# Patient Record
Sex: FEMALE | Race: WHITE | Employment: OTHER
[De-identification: names, ages, dates, MRNs, and addresses within clinical notes are randomized per-mention and may not be internally consistent; named-entity substitution may affect disease eponyms.]

---

## 2017-01-10 ENCOUNTER — OFFICE VISIT (OUTPATIENT)
Dept: FAMILY MEDICINE CLINIC | Facility: CLINIC | Age: 82
End: 2017-01-10

## 2017-01-10 VITALS
WEIGHT: 177 LBS | HEART RATE: 68 BPM | SYSTOLIC BLOOD PRESSURE: 123 MMHG | BODY MASS INDEX: 31.36 KG/M2 | DIASTOLIC BLOOD PRESSURE: 58 MMHG | HEIGHT: 63 IN

## 2017-01-10 DIAGNOSIS — R91.1 LUNG NODULE SEEN ON IMAGING STUDY: ICD-10-CM

## 2017-01-10 DIAGNOSIS — I10 ESSENTIAL HYPERTENSION: Primary | ICD-10-CM

## 2017-01-10 DIAGNOSIS — K21.9 GASTROESOPHAGEAL REFLUX DISEASE, ESOPHAGITIS PRESENCE NOT SPECIFIED: ICD-10-CM

## 2017-01-10 DIAGNOSIS — R91.1 PULMONARY NODULE: ICD-10-CM

## 2017-01-10 DIAGNOSIS — I35.0 NONRHEUMATIC AORTIC VALVE STENOSIS: ICD-10-CM

## 2017-01-10 PROCEDURE — 99213 OFFICE O/P EST LOW 20 MIN: CPT | Performed by: FAMILY MEDICINE

## 2017-01-10 RX ORDER — LISINOPRIL 10 MG/1
TABLET ORAL
Qty: 90 TABLET | Refills: 2 | Status: SHIPPED | OUTPATIENT
Start: 2017-01-10

## 2017-01-10 RX ORDER — ATORVASTATIN CALCIUM 40 MG/1
40 TABLET, FILM COATED ORAL DAILY
Qty: 90 TABLET | Refills: 3 | Status: SHIPPED | OUTPATIENT
Start: 2017-01-10

## 2017-01-10 ASSESSMENT — ENCOUNTER SYMPTOMS
SHORTNESS OF BREATH: 0
ABDOMINAL PAIN: 0
VOMITING: 0
DIARRHEA: 0
COUGH: 0
TROUBLE SWALLOWING: 0
SORE THROAT: 0
WHEEZING: 0

## 2017-01-26 RX ORDER — CITALOPRAM 20 MG/1
TABLET ORAL
Qty: 90 TABLET | Refills: 0 | Status: SHIPPED | OUTPATIENT
Start: 2017-01-26 | End: 2017-04-20 | Stop reason: SDUPTHER

## 2017-04-10 ENCOUNTER — OFFICE VISIT (OUTPATIENT)
Dept: FAMILY MEDICINE CLINIC | Age: 82
End: 2017-04-10
Payer: MEDICARE

## 2017-04-10 VITALS
DIASTOLIC BLOOD PRESSURE: 55 MMHG | SYSTOLIC BLOOD PRESSURE: 110 MMHG | HEIGHT: 63 IN | WEIGHT: 174 LBS | HEART RATE: 67 BPM | BODY MASS INDEX: 30.83 KG/M2

## 2017-04-10 DIAGNOSIS — F32.A DEPRESSION, UNSPECIFIED DEPRESSION TYPE: ICD-10-CM

## 2017-04-10 DIAGNOSIS — I35.0 NONRHEUMATIC AORTIC VALVE STENOSIS: ICD-10-CM

## 2017-04-10 DIAGNOSIS — I10 ESSENTIAL HYPERTENSION: Primary | ICD-10-CM

## 2017-04-10 DIAGNOSIS — K21.9 GASTROESOPHAGEAL REFLUX DISEASE, ESOPHAGITIS PRESENCE NOT SPECIFIED: ICD-10-CM

## 2017-04-10 DIAGNOSIS — E78.00 HYPERCHOLESTEREMIA: ICD-10-CM

## 2017-04-10 DIAGNOSIS — R91.1 PULMONARY NODULE: ICD-10-CM

## 2017-04-10 PROCEDURE — 99214 OFFICE O/P EST MOD 30 MIN: CPT | Performed by: FAMILY MEDICINE

## 2017-04-11 ASSESSMENT — ENCOUNTER SYMPTOMS
RHINORRHEA: 0
VOMITING: 0
ABDOMINAL PAIN: 0
SORE THROAT: 0
TROUBLE SWALLOWING: 0
SHORTNESS OF BREATH: 0
BLOOD IN STOOL: 0
DIARRHEA: 0
WHEEZING: 0
COUGH: 0

## 2017-04-20 RX ORDER — CITALOPRAM 20 MG/1
TABLET ORAL
Qty: 90 TABLET | Refills: 0 | Status: SHIPPED | OUTPATIENT
Start: 2017-04-20

## 2017-07-25 RX ORDER — CITALOPRAM 20 MG/1
TABLET ORAL
Qty: 90 TABLET | Refills: 0 | Status: CANCELLED | OUTPATIENT
Start: 2017-07-25

## 2017-12-17 ENCOUNTER — HOSPITAL ENCOUNTER (INPATIENT)
Age: 82
LOS: 3 days | Discharge: HOME | DRG: 378 | End: 2017-12-20
Attending: EMERGENCY MEDICINE | Admitting: INTERNAL MEDICINE
Payer: MEDICARE

## 2017-12-17 ENCOUNTER — APPOINTMENT (OUTPATIENT)
Dept: CT IMAGING | Age: 82
DRG: 378 | End: 2017-12-17
Payer: MEDICARE

## 2017-12-17 DIAGNOSIS — K92.2 GASTROINTESTINAL HEMORRHAGE, UNSPECIFIED GASTROINTESTINAL HEMORRHAGE TYPE: Primary | ICD-10-CM

## 2017-12-17 DIAGNOSIS — D64.9 ANEMIA, UNSPECIFIED TYPE: ICD-10-CM

## 2017-12-17 LAB
ABSOLUTE EOS #: 0.1 K/UL (ref 0–0.4)
ABSOLUTE IMMATURE GRANULOCYTE: ABNORMAL K/UL (ref 0–0.3)
ABSOLUTE LYMPH #: 0.9 K/UL (ref 1–4.8)
ABSOLUTE MONO #: 0.5 K/UL (ref 0.2–0.8)
ALBUMIN SERPL-MCNC: 3.2 G/DL (ref 3.5–5.2)
ALBUMIN/GLOBULIN RATIO: ABNORMAL (ref 1–2.5)
ALP BLD-CCNC: 67 U/L (ref 35–104)
ALT SERPL-CCNC: 8 U/L (ref 5–33)
AMYLASE: 34 U/L (ref 28–100)
ANION GAP SERPL CALCULATED.3IONS-SCNC: 13 MMOL/L (ref 9–17)
AST SERPL-CCNC: 11 U/L
BASOPHILS # BLD: 0 % (ref 0–2)
BASOPHILS ABSOLUTE: 0 K/UL (ref 0–0.2)
BILIRUB SERPL-MCNC: 0.28 MG/DL (ref 0.3–1.2)
BUN BLDV-MCNC: 53 MG/DL (ref 8–23)
BUN/CREAT BLD: 60 (ref 9–20)
CALCIUM SERPL-MCNC: 8.6 MG/DL (ref 8.6–10.4)
CHLORIDE BLD-SCNC: 100 MMOL/L (ref 98–107)
CO2: 23 MMOL/L (ref 20–31)
CREAT SERPL-MCNC: 0.88 MG/DL (ref 0.5–0.9)
DATE, STOOL #1: ABNORMAL
DATE, STOOL #2: ABNORMAL
DATE, STOOL #3: ABNORMAL
DIFFERENTIAL TYPE: ABNORMAL
EOSINOPHILS RELATIVE PERCENT: 1 % (ref 1–4)
GFR AFRICAN AMERICAN: >60 ML/MIN
GFR NON-AFRICAN AMERICAN: >60 ML/MIN
GFR SERPL CREATININE-BSD FRML MDRD: ABNORMAL ML/MIN/{1.73_M2}
GFR SERPL CREATININE-BSD FRML MDRD: ABNORMAL ML/MIN/{1.73_M2}
GLUCOSE BLD-MCNC: 147 MG/DL (ref 70–99)
HCT VFR BLD CALC: 22.2 % (ref 36–46)
HCT VFR BLD CALC: 26 % (ref 36–46)
HEMOCCULT SP1 STL QL: POSITIVE
HEMOCCULT SP2 STL QL: ABNORMAL
HEMOCCULT SP3 STL QL: ABNORMAL
HEMOGLOBIN: 7.3 G/DL (ref 12–16)
HEMOGLOBIN: 8.4 G/DL (ref 12–16)
IMMATURE GRANULOCYTES: ABNORMAL %
INR BLD: 1
LACTATE DEHYDROGENASE: 111 U/L (ref 135–214)
LACTIC ACID: 1.2 MMOL/L (ref 0.5–2.2)
LIPASE: 23 U/L (ref 13–60)
LYMPHOCYTES # BLD: 9 % (ref 24–44)
MCH RBC QN AUTO: 29.3 PG (ref 26–34)
MCHC RBC AUTO-ENTMCNC: 32.2 G/DL (ref 31–37)
MCV RBC AUTO: 90.8 FL (ref 80–100)
MONOCYTES # BLD: 5 % (ref 1–7)
PARTIAL THROMBOPLASTIN TIME: 23.1 SEC (ref 23–31)
PDW BLD-RTO: 13.3 % (ref 11.5–14.5)
PLATELET # BLD: 201 K/UL (ref 130–400)
PLATELET ESTIMATE: ABNORMAL
PMV BLD AUTO: ABNORMAL FL (ref 6–12)
POTASSIUM SERPL-SCNC: 4.9 MMOL/L (ref 3.7–5.3)
PROTHROMBIN TIME: 10.7 SEC (ref 9.7–11.6)
RBC # BLD: 2.86 M/UL (ref 4–5.2)
RBC # BLD: ABNORMAL 10*6/UL
SEG NEUTROPHILS: 85 % (ref 36–66)
SEGMENTED NEUTROPHILS ABSOLUTE COUNT: 8.6 K/UL (ref 1.8–7.7)
SODIUM BLD-SCNC: 136 MMOL/L (ref 135–144)
TIME, STOOL #1: 1724
TIME, STOOL #2: ABNORMAL
TIME, STOOL #3: ABNORMAL
TOTAL PROTEIN: 5.1 G/DL (ref 6.4–8.3)
WBC # BLD: 10.1 K/UL (ref 3.5–11)
WBC # BLD: ABNORMAL 10*3/UL

## 2017-12-17 PROCEDURE — 2000000000 HC ICU R&B

## 2017-12-17 PROCEDURE — G0328 FECAL BLOOD SCRN IMMUNOASSAY: HCPCS

## 2017-12-17 PROCEDURE — 86850 RBC ANTIBODY SCREEN: CPT

## 2017-12-17 PROCEDURE — 36430 TRANSFUSION BLD/BLD COMPNT: CPT

## 2017-12-17 PROCEDURE — 99285 EMERGENCY DEPT VISIT HI MDM: CPT

## 2017-12-17 PROCEDURE — 85610 PROTHROMBIN TIME: CPT

## 2017-12-17 PROCEDURE — 85025 COMPLETE CBC W/AUTO DIFF WBC: CPT

## 2017-12-17 PROCEDURE — 6360000004 HC RX CONTRAST MEDICATION: Performed by: EMERGENCY MEDICINE

## 2017-12-17 PROCEDURE — 96365 THER/PROPH/DIAG IV INF INIT: CPT

## 2017-12-17 PROCEDURE — C9113 INJ PANTOPRAZOLE SODIUM, VIA: HCPCS | Performed by: PHYSICIAN ASSISTANT

## 2017-12-17 PROCEDURE — 83605 ASSAY OF LACTIC ACID: CPT

## 2017-12-17 PROCEDURE — 85018 HEMOGLOBIN: CPT

## 2017-12-17 PROCEDURE — 2580000003 HC RX 258: Performed by: EMERGENCY MEDICINE

## 2017-12-17 PROCEDURE — 82150 ASSAY OF AMYLASE: CPT

## 2017-12-17 PROCEDURE — 83690 ASSAY OF LIPASE: CPT

## 2017-12-17 PROCEDURE — 2580000003 HC RX 258: Performed by: PHYSICIAN ASSISTANT

## 2017-12-17 PROCEDURE — 85014 HEMATOCRIT: CPT

## 2017-12-17 PROCEDURE — 86901 BLOOD TYPING SEROLOGIC RH(D): CPT

## 2017-12-17 PROCEDURE — 86900 BLOOD TYPING SEROLOGIC ABO: CPT

## 2017-12-17 PROCEDURE — 86920 COMPATIBILITY TEST SPIN: CPT

## 2017-12-17 PROCEDURE — 36415 COLL VENOUS BLD VENIPUNCTURE: CPT

## 2017-12-17 PROCEDURE — 83615 LACTATE (LD) (LDH) ENZYME: CPT

## 2017-12-17 PROCEDURE — 74177 CT ABD & PELVIS W/CONTRAST: CPT

## 2017-12-17 PROCEDURE — P9016 RBC LEUKOCYTES REDUCED: HCPCS

## 2017-12-17 PROCEDURE — 85730 THROMBOPLASTIN TIME PARTIAL: CPT

## 2017-12-17 PROCEDURE — 2580000003 HC RX 258: Performed by: INTERNAL MEDICINE

## 2017-12-17 PROCEDURE — 80053 COMPREHEN METABOLIC PANEL: CPT

## 2017-12-17 PROCEDURE — 6360000002 HC RX W HCPCS: Performed by: PHYSICIAN ASSISTANT

## 2017-12-17 RX ORDER — ACETAMINOPHEN 325 MG/1
650 TABLET ORAL EVERY 4 HOURS PRN
Status: DISCONTINUED | OUTPATIENT
Start: 2017-12-17 | End: 2017-12-20 | Stop reason: HOSPADM

## 2017-12-17 RX ORDER — ONDANSETRON 2 MG/ML
4 INJECTION INTRAMUSCULAR; INTRAVENOUS EVERY 6 HOURS PRN
Status: DISCONTINUED | OUTPATIENT
Start: 2017-12-17 | End: 2017-12-20 | Stop reason: HOSPADM

## 2017-12-17 RX ORDER — SODIUM CHLORIDE 9 MG/ML
INJECTION, SOLUTION INTRAVENOUS CONTINUOUS
Status: DISCONTINUED | OUTPATIENT
Start: 2017-12-17 | End: 2017-12-19

## 2017-12-17 RX ORDER — 0.9 % SODIUM CHLORIDE 0.9 %
250 INTRAVENOUS SOLUTION INTRAVENOUS ONCE
Status: DISCONTINUED | OUTPATIENT
Start: 2017-12-17 | End: 2017-12-20

## 2017-12-17 RX ORDER — SODIUM CHLORIDE 0.9 % (FLUSH) 0.9 %
10 SYRINGE (ML) INJECTION PRN
Status: DISCONTINUED | OUTPATIENT
Start: 2017-12-17 | End: 2017-12-18 | Stop reason: SDUPTHER

## 2017-12-17 RX ORDER — ATORVASTATIN CALCIUM 40 MG/1
40 TABLET, FILM COATED ORAL DAILY
Status: DISCONTINUED | OUTPATIENT
Start: 2017-12-18 | End: 2017-12-20 | Stop reason: HOSPADM

## 2017-12-17 RX ORDER — DIGOXIN 125 MCG
125 TABLET ORAL DAILY
Status: DISCONTINUED | OUTPATIENT
Start: 2017-12-18 | End: 2017-12-20 | Stop reason: HOSPADM

## 2017-12-17 RX ORDER — CITALOPRAM 20 MG/1
20 TABLET ORAL DAILY
Status: DISCONTINUED | OUTPATIENT
Start: 2017-12-18 | End: 2017-12-20 | Stop reason: HOSPADM

## 2017-12-17 RX ORDER — FUROSEMIDE 10 MG/ML
10 INJECTION INTRAMUSCULAR; INTRAVENOUS SEE ADMIN INSTRUCTIONS
Status: DISCONTINUED | OUTPATIENT
Start: 2017-12-17 | End: 2017-12-20

## 2017-12-17 RX ORDER — 0.9 % SODIUM CHLORIDE 0.9 %
50 INTRAVENOUS SOLUTION INTRAVENOUS ONCE
Status: COMPLETED | OUTPATIENT
Start: 2017-12-17 | End: 2017-12-17

## 2017-12-17 RX ORDER — 0.9 % SODIUM CHLORIDE 0.9 %
1000 INTRAVENOUS SOLUTION INTRAVENOUS ONCE
Status: COMPLETED | OUTPATIENT
Start: 2017-12-17 | End: 2017-12-17

## 2017-12-17 RX ADMIN — SODIUM CHLORIDE 8 MG/HR: 9 INJECTION, SOLUTION INTRAVENOUS at 18:23

## 2017-12-17 RX ADMIN — IOPAMIDOL 125 ML: 755 INJECTION, SOLUTION INTRAVENOUS at 19:24

## 2017-12-17 RX ADMIN — SODIUM CHLORIDE 1000 ML: 9 INJECTION, SOLUTION INTRAVENOUS at 17:35

## 2017-12-17 RX ADMIN — SODIUM CHLORIDE 50 ML: 9 INJECTION, SOLUTION INTRAVENOUS at 19:25

## 2017-12-17 RX ADMIN — SODIUM CHLORIDE 80 MG: 9 INJECTION, SOLUTION INTRAVENOUS at 17:44

## 2017-12-17 RX ADMIN — SODIUM CHLORIDE: 9 INJECTION, SOLUTION INTRAVENOUS at 22:19

## 2017-12-17 RX ADMIN — Medication 10 ML: at 19:24

## 2017-12-17 NOTE — ED PROVIDER NOTES
124/56 (!) 134/55 (!) 124/44   Pulse: 74 73 71 73   Resp: 16 12 17 16   Temp:  98.1 °F (36.7 °C) 98.1 °F (36.7 °C) 98.6 °F (37 °C)   TempSrc:       SpO2: 96% 96% 97% 96%   Patient will be admitted to the hospital.  Patient has black stool. Concern is for upper GI bleed. Type and cross has been ordered. Type and screen has been ordered. Patient denies any abdominal pain. Will be admitted. Does not take any blood thinners. CONSULTS:  IP CONSULT TO INTERNAL MEDICINE  IP CONSULT TO GI  IP CONSULT TO GENERAL SURGERY    PROCEDURES:  Procedures    CRITICAL CARE TIME     Due to the high probability of sudden and clinically significant deterioration in the patient's condition she required highest level of my preparedness to intervene urgently. I provided critical care time including documentation time, medication orders and management, reevaluation, vital sign assessment, ordering and reviewing of of lab tests ordering and reviewing of x-ray studies, and admission orders. Aggregate critical care time is between 35 minutes including only time during which I was engaged in work directly related to her care and did not include time spent treating other patients simultaneously. FINAL IMPRESSION      1. Gastrointestinal hemorrhage, unspecified gastrointestinal hemorrhage type    2.  Anemia, unspecified type          DISPOSITION/PLAN   DISPOSITION Admitted    PATIENT REFERRED TO:   Julian Euceda, [address] [place] [address]  [phone]            DISCHARGE MEDICATIONS:     New Prescriptions    No medications on file           (Please note that portions of this note were completed with a voice recognition program.  Efforts were made to edit the dictations but occasionally words are mis-transcribed.)    STEPHANIE Milligan PA-C  12/17/17 2703

## 2017-12-18 ENCOUNTER — ANESTHESIA (OUTPATIENT)
Dept: OPERATING ROOM | Age: 82
DRG: 378 | End: 2017-12-18
Payer: MEDICARE

## 2017-12-18 ENCOUNTER — ANESTHESIA EVENT (OUTPATIENT)
Dept: OPERATING ROOM | Age: 82
DRG: 378 | End: 2017-12-18
Payer: MEDICARE

## 2017-12-18 VITALS
SYSTOLIC BLOOD PRESSURE: 188 MMHG | DIASTOLIC BLOOD PRESSURE: 80 MMHG | OXYGEN SATURATION: 99 % | RESPIRATION RATE: 22 BRPM

## 2017-12-18 PROBLEM — D62 ACUTE BLOOD LOSS ANEMIA: Status: ACTIVE | Noted: 2017-12-18

## 2017-12-18 PROBLEM — K25.0 ACUTE GASTRIC ULCER WITH HEMORRHAGE: Status: ACTIVE | Noted: 2017-12-18

## 2017-12-18 LAB
ABSOLUTE EOS #: 0.1 K/UL (ref 0–0.4)
ABSOLUTE IMMATURE GRANULOCYTE: ABNORMAL K/UL (ref 0–0.3)
ABSOLUTE LYMPH #: 1 K/UL (ref 1–4.8)
ABSOLUTE MONO #: 0.5 K/UL (ref 0.2–0.8)
ALBUMIN SERPL-MCNC: 3.4 G/DL (ref 3.5–5.2)
ALBUMIN/GLOBULIN RATIO: ABNORMAL (ref 1–2.5)
ALP BLD-CCNC: 66 U/L (ref 35–104)
ALT SERPL-CCNC: 9 U/L (ref 5–33)
ANION GAP SERPL CALCULATED.3IONS-SCNC: 13 MMOL/L (ref 9–17)
AST SERPL-CCNC: 13 U/L
BASOPHILS # BLD: 0 % (ref 0–2)
BASOPHILS ABSOLUTE: 0 K/UL (ref 0–0.2)
BILIRUB SERPL-MCNC: 0.71 MG/DL (ref 0.3–1.2)
BILIRUBIN DIRECT: 0.18 MG/DL
BILIRUBIN, INDIRECT: 0.53 MG/DL (ref 0–1)
BUN BLDV-MCNC: 41 MG/DL (ref 8–23)
BUN/CREAT BLD: 61 (ref 9–20)
CALCIUM SERPL-MCNC: 8.7 MG/DL (ref 8.6–10.4)
CHLORIDE BLD-SCNC: 104 MMOL/L (ref 98–107)
CO2: 23 MMOL/L (ref 20–31)
CREAT SERPL-MCNC: 0.67 MG/DL (ref 0.5–0.9)
DIFFERENTIAL TYPE: ABNORMAL
DIGOXIN DATE LAST DOSE: ABNORMAL
DIGOXIN DOSE AMOUNT: ABNORMAL
DIGOXIN DOSE TIME: ABNORMAL
DIGOXIN LEVEL: 0.4 NG/ML (ref 0.5–2)
EOSINOPHILS RELATIVE PERCENT: 2 % (ref 1–4)
GFR AFRICAN AMERICAN: >60 ML/MIN
GFR NON-AFRICAN AMERICAN: >60 ML/MIN
GFR SERPL CREATININE-BSD FRML MDRD: ABNORMAL ML/MIN/{1.73_M2}
GFR SERPL CREATININE-BSD FRML MDRD: ABNORMAL ML/MIN/{1.73_M2}
GLOBULIN: ABNORMAL G/DL (ref 1.5–3.8)
GLUCOSE BLD-MCNC: 104 MG/DL (ref 70–99)
HCT VFR BLD CALC: 30.7 % (ref 36–46)
HCT VFR BLD CALC: 31.4 % (ref 36–46)
HCT VFR BLD CALC: 32.8 % (ref 36–46)
HEMOGLOBIN: 10 G/DL (ref 12–16)
HEMOGLOBIN: 10.3 G/DL (ref 12–16)
HEMOGLOBIN: 10.8 G/DL (ref 12–16)
IMMATURE GRANULOCYTES: ABNORMAL %
INR BLD: 0.9
LACTIC ACID, WHOLE BLOOD: NORMAL MMOL/L (ref 0.7–2.1)
LACTIC ACID: 0.7 MMOL/L (ref 0.5–2.2)
LYMPHOCYTES # BLD: 16 % (ref 24–44)
MAGNESIUM: 1.7 MG/DL (ref 1.6–2.6)
MCH RBC QN AUTO: 29.9 PG (ref 26–34)
MCHC RBC AUTO-ENTMCNC: 32.8 G/DL (ref 31–37)
MCV RBC AUTO: 91.2 FL (ref 80–100)
MONOCYTES # BLD: 8 % (ref 1–7)
PARTIAL THROMBOPLASTIN TIME: 21.3 SEC (ref 23–31)
PDW BLD-RTO: 14.1 % (ref 11.5–14.5)
PLATELET # BLD: 168 K/UL (ref 130–400)
PLATELET ESTIMATE: ABNORMAL
PMV BLD AUTO: 7.8 FL (ref 6–12)
POTASSIUM SERPL-SCNC: 4.1 MMOL/L (ref 3.7–5.3)
PROTHROMBIN TIME: 9.8 SEC (ref 9.7–11.6)
RBC # BLD: 3.45 M/UL (ref 4–5.2)
RBC # BLD: ABNORMAL 10*6/UL
SEG NEUTROPHILS: 74 % (ref 36–66)
SEGMENTED NEUTROPHILS ABSOLUTE COUNT: 4.7 K/UL (ref 1.8–7.7)
SODIUM BLD-SCNC: 140 MMOL/L (ref 135–144)
TOTAL PROTEIN: 5.6 G/DL (ref 6.4–8.3)
WBC # BLD: 6.3 K/UL (ref 3.5–11)
WBC # BLD: ABNORMAL 10*3/UL

## 2017-12-18 PROCEDURE — 83735 ASSAY OF MAGNESIUM: CPT

## 2017-12-18 PROCEDURE — 36430 TRANSFUSION BLD/BLD COMPNT: CPT

## 2017-12-18 PROCEDURE — 3609012400 HC EGD TRANSORAL BIOPSY SINGLE/MULTIPLE: Performed by: INTERNAL MEDICINE

## 2017-12-18 PROCEDURE — 3700000001 HC ADD 15 MINUTES (ANESTHESIA): Performed by: INTERNAL MEDICINE

## 2017-12-18 PROCEDURE — 2580000003 HC RX 258: Performed by: ANESTHESIOLOGY

## 2017-12-18 PROCEDURE — 85025 COMPLETE CBC W/AUTO DIFF WBC: CPT

## 2017-12-18 PROCEDURE — 43239 EGD BIOPSY SINGLE/MULTIPLE: CPT | Performed by: INTERNAL MEDICINE

## 2017-12-18 PROCEDURE — 6360000002 HC RX W HCPCS: Performed by: ANESTHESIOLOGY

## 2017-12-18 PROCEDURE — 6370000000 HC RX 637 (ALT 250 FOR IP): Performed by: INTERNAL MEDICINE

## 2017-12-18 PROCEDURE — 0DB68ZX EXCISION OF STOMACH, VIA NATURAL OR ARTIFICIAL OPENING ENDOSCOPIC, DIAGNOSTIC: ICD-10-PCS | Performed by: INTERNAL MEDICINE

## 2017-12-18 PROCEDURE — P9016 RBC LEUKOCYTES REDUCED: HCPCS

## 2017-12-18 PROCEDURE — 83605 ASSAY OF LACTIC ACID: CPT

## 2017-12-18 PROCEDURE — 2000000000 HC ICU R&B

## 2017-12-18 PROCEDURE — 85014 HEMATOCRIT: CPT

## 2017-12-18 PROCEDURE — 36415 COLL VENOUS BLD VENIPUNCTURE: CPT

## 2017-12-18 PROCEDURE — 85730 THROMBOPLASTIN TIME PARTIAL: CPT

## 2017-12-18 PROCEDURE — 2580000003 HC RX 258: Performed by: INTERNAL MEDICINE

## 2017-12-18 PROCEDURE — 86900 BLOOD TYPING SEROLOGIC ABO: CPT

## 2017-12-18 PROCEDURE — 85018 HEMOGLOBIN: CPT

## 2017-12-18 PROCEDURE — C9113 INJ PANTOPRAZOLE SODIUM, VIA: HCPCS | Performed by: INTERNAL MEDICINE

## 2017-12-18 PROCEDURE — 85610 PROTHROMBIN TIME: CPT

## 2017-12-18 PROCEDURE — 80076 HEPATIC FUNCTION PANEL: CPT

## 2017-12-18 PROCEDURE — 2500000003 HC RX 250 WO HCPCS: Performed by: ANESTHESIOLOGY

## 2017-12-18 PROCEDURE — 80162 ASSAY OF DIGOXIN TOTAL: CPT

## 2017-12-18 PROCEDURE — 3700000000 HC ANESTHESIA ATTENDED CARE: Performed by: INTERNAL MEDICINE

## 2017-12-18 PROCEDURE — 7100000001 HC PACU RECOVERY - ADDTL 15 MIN: Performed by: INTERNAL MEDICINE

## 2017-12-18 PROCEDURE — 6360000002 HC RX W HCPCS: Performed by: INTERNAL MEDICINE

## 2017-12-18 PROCEDURE — 7100000000 HC PACU RECOVERY - FIRST 15 MIN: Performed by: INTERNAL MEDICINE

## 2017-12-18 PROCEDURE — 80048 BASIC METABOLIC PNL TOTAL CA: CPT

## 2017-12-18 PROCEDURE — 88305 TISSUE EXAM BY PATHOLOGIST: CPT

## 2017-12-18 RX ORDER — HYDROMORPHONE HCL 110MG/55ML
0.5 PATIENT CONTROLLED ANALGESIA SYRINGE INTRAVENOUS EVERY 5 MIN PRN
Status: DISCONTINUED | OUTPATIENT
Start: 2017-12-18 | End: 2017-12-18 | Stop reason: HOSPADM

## 2017-12-18 RX ORDER — DIPHENHYDRAMINE HYDROCHLORIDE 50 MG/ML
12.5 INJECTION INTRAMUSCULAR; INTRAVENOUS
Status: DISCONTINUED | OUTPATIENT
Start: 2017-12-18 | End: 2017-12-18 | Stop reason: HOSPADM

## 2017-12-18 RX ORDER — FUROSEMIDE 10 MG/ML
10 INJECTION INTRAMUSCULAR; INTRAVENOUS ONCE
Status: COMPLETED | OUTPATIENT
Start: 2017-12-18 | End: 2017-12-18

## 2017-12-18 RX ORDER — DEXAMETHASONE SODIUM PHOSPHATE 10 MG/ML
4 INJECTION INTRAMUSCULAR; INTRAVENOUS
Status: DISCONTINUED | OUTPATIENT
Start: 2017-12-18 | End: 2017-12-18 | Stop reason: HOSPADM

## 2017-12-18 RX ORDER — FENTANYL CITRATE 50 UG/ML
25 INJECTION, SOLUTION INTRAMUSCULAR; INTRAVENOUS EVERY 5 MIN PRN
Status: DISCONTINUED | OUTPATIENT
Start: 2017-12-18 | End: 2017-12-18 | Stop reason: HOSPADM

## 2017-12-18 RX ORDER — HYDRALAZINE HYDROCHLORIDE 20 MG/ML
5 INJECTION INTRAMUSCULAR; INTRAVENOUS EVERY 10 MIN PRN
Status: DISCONTINUED | OUTPATIENT
Start: 2017-12-18 | End: 2017-12-18 | Stop reason: HOSPADM

## 2017-12-18 RX ORDER — PROPOFOL 10 MG/ML
INJECTION, EMULSION INTRAVENOUS PRN
Status: DISCONTINUED | OUTPATIENT
Start: 2017-12-18 | End: 2017-12-18 | Stop reason: SDUPTHER

## 2017-12-18 RX ORDER — SODIUM CHLORIDE 0.9 % (FLUSH) 0.9 %
10 SYRINGE (ML) INJECTION PRN
Status: DISCONTINUED | OUTPATIENT
Start: 2017-12-18 | End: 2017-12-20 | Stop reason: HOSPADM

## 2017-12-18 RX ORDER — ONDANSETRON 2 MG/ML
4 INJECTION INTRAMUSCULAR; INTRAVENOUS
Status: DISCONTINUED | OUTPATIENT
Start: 2017-12-18 | End: 2017-12-18 | Stop reason: HOSPADM

## 2017-12-18 RX ORDER — LIDOCAINE HYDROCHLORIDE 20 MG/ML
INJECTION, SOLUTION EPIDURAL; INFILTRATION; INTRACAUDAL; PERINEURAL PRN
Status: DISCONTINUED | OUTPATIENT
Start: 2017-12-18 | End: 2017-12-18 | Stop reason: SDUPTHER

## 2017-12-18 RX ORDER — SODIUM CHLORIDE 0.9 % (FLUSH) 0.9 %
10 SYRINGE (ML) INJECTION EVERY 12 HOURS SCHEDULED
Status: DISCONTINUED | OUTPATIENT
Start: 2017-12-18 | End: 2017-12-20 | Stop reason: HOSPADM

## 2017-12-18 RX ORDER — SODIUM CHLORIDE, SODIUM LACTATE, POTASSIUM CHLORIDE, CALCIUM CHLORIDE 600; 310; 30; 20 MG/100ML; MG/100ML; MG/100ML; MG/100ML
INJECTION, SOLUTION INTRAVENOUS CONTINUOUS PRN
Status: DISCONTINUED | OUTPATIENT
Start: 2017-12-18 | End: 2017-12-18 | Stop reason: SDUPTHER

## 2017-12-18 RX ORDER — MEPERIDINE HYDROCHLORIDE 25 MG/ML
12.5 INJECTION INTRAMUSCULAR; INTRAVENOUS; SUBCUTANEOUS EVERY 5 MIN PRN
Status: DISCONTINUED | OUTPATIENT
Start: 2017-12-18 | End: 2017-12-18 | Stop reason: HOSPADM

## 2017-12-18 RX ORDER — LABETALOL HYDROCHLORIDE 5 MG/ML
5 INJECTION, SOLUTION INTRAVENOUS EVERY 10 MIN PRN
Status: DISCONTINUED | OUTPATIENT
Start: 2017-12-18 | End: 2017-12-18 | Stop reason: HOSPADM

## 2017-12-18 RX ADMIN — SODIUM CHLORIDE: 9 INJECTION, SOLUTION INTRAVENOUS at 16:44

## 2017-12-18 RX ADMIN — FUROSEMIDE 10 MG: 10 INJECTION, SOLUTION INTRAMUSCULAR; INTRAVENOUS at 02:06

## 2017-12-18 RX ADMIN — CITALOPRAM HYDROBROMIDE 20 MG: 20 TABLET ORAL at 10:07

## 2017-12-18 RX ADMIN — SODIUM CHLORIDE 8 MG/HR: 9 INJECTION, SOLUTION INTRAVENOUS at 05:25

## 2017-12-18 RX ADMIN — SODIUM CHLORIDE 8 MG/HR: 9 INJECTION, SOLUTION INTRAVENOUS at 16:20

## 2017-12-18 RX ADMIN — PROPOFOL 100 MG: 10 INJECTION, EMULSION INTRAVENOUS at 15:12

## 2017-12-18 RX ADMIN — FUROSEMIDE 10 MG: 10 INJECTION, SOLUTION INTRAMUSCULAR; INTRAVENOUS at 05:25

## 2017-12-18 RX ADMIN — ATORVASTATIN CALCIUM 40 MG: 40 TABLET, FILM COATED ORAL at 10:07

## 2017-12-18 RX ADMIN — SODIUM CHLORIDE, POTASSIUM CHLORIDE, SODIUM LACTATE AND CALCIUM CHLORIDE: 600; 310; 30; 20 INJECTION, SOLUTION INTRAVENOUS at 15:10

## 2017-12-18 RX ADMIN — DIGOXIN 125 MCG: 125 TABLET ORAL at 10:06

## 2017-12-18 RX ADMIN — LIDOCAINE HYDROCHLORIDE 3 ML: 20 INJECTION, SOLUTION EPIDURAL; INFILTRATION; INTRACAUDAL; PERINEURAL at 15:13

## 2017-12-18 RX ADMIN — SODIUM CHLORIDE: 9 INJECTION, SOLUTION INTRAVENOUS at 08:12

## 2017-12-18 ASSESSMENT — ENCOUNTER SYMPTOMS
NAUSEA: 0
BLURRED VISION: 0
EYE DISCHARGE: 0
SHORTNESS OF BREATH: 0
HEARTBURN: 0
BLOOD IN STOOL: 1
VOMITING: 0
SORE THROAT: 0
CONSTIPATION: 0
COUGH: 0
ABDOMINAL PAIN: 0
BACK PAIN: 0
DIARRHEA: 0
WHEEZING: 0

## 2017-12-18 ASSESSMENT — PULMONARY FUNCTION TESTS
PIF_VALUE: 0
PIF_VALUE: 1
PIF_VALUE: 0
PIF_VALUE: 0
PIF_VALUE: 1
PIF_VALUE: 0
PIF_VALUE: 1
PIF_VALUE: 0
PIF_VALUE: 0
PIF_VALUE: 1
PIF_VALUE: 1

## 2017-12-18 ASSESSMENT — PAIN SCALES - GENERAL
PAINLEVEL_OUTOF10: 0
PAINLEVEL_OUTOF10: 0

## 2017-12-18 NOTE — FLOWSHEET NOTE
Patient resting in bed; states her  has been in to visit; expresses no spiritual/emotional needs at this time; thanks  for visiting. Spiritual Care will follow as needed.      12/18/17 1118   Encounter Summary   Services provided to: Patient   Referral/Consult From: [facility] Completed   Continue Visiting (12/18/17)   Complexity of Encounter Low   Length of Encounter 15 minutes   Spiritual Assessment Completed Yes   Routine   Type Initial   Assessment Approachable   Intervention Active listening   Outcome Expressed gratitude

## 2017-12-18 NOTE — ED NOTES
1st unit of blood being transfused, all patient questions answered.       Katarina Charles, RN  12/17/17 6033

## 2017-12-18 NOTE — ANESTHESIA POSTPROCEDURE EVALUATION
Department of Anesthesiology  Postprocedure Note    Patient: Ck Rooney  MRN: [id number]  YOB: 1931  Date of evaluation: 12/18/2017  Time:  3:44 PM     Procedure Summary     Date:  12/18/17 Room / Location:  [facility] M2 / [facility] OR    Anesthesia Start:  9180 Anesthesia Stop:  5385    Procedure:  EGD BIOPSY (N/A ) Diagnosis:  (inpat)    Surgeon:  Yonathan Whyte MD Responsible Provider:      Anesthesia Type:  general ASA Status:  3          Anesthesia Type: No value filed. Jose Alejandro Phase I:      Jose Alejandro Phase II:      Last vitals: Reviewed and per EMR flowsheets.        Anesthesia Post Evaluation    Patient location during evaluation: PACU  Patient participation: complete - patient participated  Level of consciousness: awake and awake and alert  Pain score: 0  Airway patency: patent  Nausea & Vomiting: no nausea and no vomiting  Complications: no  Cardiovascular status: blood pressure returned to baseline  Respiratory status: acceptable  Hydration status: euvolemic

## 2017-12-18 NOTE — CONSULTS
Surgical History:   Procedure Laterality Date    BACK SURGERY      broke her back    CAROTID ENDARTERECTOMY Right     COLON SURGERY      COLONOSCOPY      DENTAL SURGERY      top dentures    HERNIA REPAIR      X's 2    JOINT REPLACEMENT      TONSILLECTOMY      UPPER GASTROINTESTINAL ENDOSCOPY          Medications Prior to Admission:       Prior to Admission medications    Medication Sig Start Date End Date Taking? Authorizing Provider   citalopram (CELEXA) 20 MG tablet TAKE ONE TABLET BY MOUTH DAILY 4/20/17   Amy Nielsen MD   atorvastatin (LIPITOR) 40 MG tablet Take 1 tablet by mouth daily 1/10/17   Amy Nielsen MD   lisinopril (PRINIVIL;ZESTRIL) 10 MG tablet TAKE ONE TABLET BY MOUTH DAILY 1/10/17   Amy Nielsen MD   furosemide (LASIX) 20 MG tablet TAKE ONE TABLET BY MOUTH EVERY MORNING 12/23/16   Amy Nielsen MD   metoprolol succinate (TOPROL XL) 25 MG extended release tablet TAKE ONE TABLET BY MOUTH DAILY 12/16/16   Amy Nielsen MD   digoxin (LANOXIN) 125 MCG tablet TAKE ONE TABLET BY MOUTH DAILY 10/10/16   Amy Nielsen MD   Calcium Carbonate-Vitamin D (CALTRATE 600+D PO) Take 1 tablet by mouth 2 times daily. Historical Provider, MD   Multiple Vitamins-Minerals (CENTRUM SILVER ADULT 50+) TABS Take 1 tablet by mouth daily. Historical Provider, MD   aspirin 81 MG tablet Take 81 mg by mouth daily. Historical Provider, MD   omeprazole (PRILOSEC) 20 MG capsule Take 20 mg by mouth daily. Historical Provider, MD        Allergies:       Review of patient's allergies indicates no known allergies. Social History:     Tobacco:    reports that she has never smoked. She has never used smokeless tobacco.  Alcohol:      reports that she does not drink alcohol. Drug Use:  reports that she does not use drugs.     Family History:     Family History   Problem Relation Age of Onset    Heart Disease Mother     Heart Disease Sister     Heart Disease Brother        Review of Systems:     Review of Systems   Constitutional: Negative for fever and weight loss. HENT: Negative for nosebleeds and sore throat. Eyes: Negative for blurred vision and discharge. Respiratory: Negative for cough, shortness of breath and wheezing. Cardiovascular: Negative for chest pain, palpitations and leg swelling. Gastrointestinal: Positive for blood in stool and melena. Negative for abdominal pain, constipation, diarrhea, heartburn, nausea and vomiting. Genitourinary: Negative for flank pain, hematuria and urgency. Musculoskeletal: Negative for back pain, joint pain, myalgias and neck pain. Skin: Negative for itching and rash. Neurological: Negative for dizziness, tremors, focal weakness, seizures, loss of consciousness, weakness and headaches. Endo/Heme/Allergies: Negative for polydipsia. Does not bruise/bleed easily. Psychiatric/Behavioral: The patient is nervous/anxious. The patient does not have insomnia. Code Status:  Full Code    Physical Exam:     Vitals:  BP (!) 140/71   Pulse 72   Temp 98.8 °F (37.1 °C) (Oral)   Resp 18   Ht 5' 3\" (1.6 m)   Wt 179 lb 14.4 oz (81.6 kg)   SpO2 97%   BMI 31.87 kg/m²   Temp (24hrs), Av.5 °F (36.9 °C), Min:97 °F (36.1 °C), Max:100 °F (37.8 °C)      Physical Exam   Constitutional: She is oriented to person, place, and time. She appears well-developed and well-nourished. HENT:   Head: Normocephalic and atraumatic. No oral lesions   Eyes: Conjunctivae are normal. Pupils are equal, round, and reactive to light. No scleral icterus. Neck: Normal range of motion. Neck supple. No hepatojugular reflux and no JVD present. No tracheal deviation present. No thyromegaly present. Cardiovascular: Normal rate, regular rhythm, normal heart sounds and intact distal pulses. Has occasional extra beats. Pulmonary/Chest: Effort normal and breath sounds normal. No respiratory distress. She has no wheezes.  She has no rales. Abdominal: Soft. Bowel sounds are normal. She exhibits no distension, no ascites and no mass. There is no hepatomegaly. There is no tenderness. There is no rebound. No hernia. Genitourinary: Rectal exam shows guaiac positive stool (rectal examination revealed maroon-colored stools. ). Musculoskeletal: She exhibits no edema or tenderness. No joint swelling   Lymphadenopathy:     She has no cervical adenopathy. Neurological: She is alert and oriented to person, place, and time. No cranial nerve deficit. Skin: Skin is warm. No bruising, no ecchymosis and no rash noted. No erythema. Psychiatric: Thought content normal.   Nursing note and vitals reviewed.     Data:   CBC:   Lab Results   Component Value Date    WBC 6.3 12/18/2017    RBC 3.45 12/18/2017    HGB 10.3 12/18/2017    HCT 31.4 12/18/2017    MCV 91.2 12/18/2017    MCH 29.9 12/18/2017    MCHC 32.8 12/18/2017    RDW 14.1 12/18/2017     12/18/2017    MPV 7.8 12/18/2017     CBC with Differential:    Lab Results   Component Value Date    WBC 6.3 12/18/2017    RBC 3.45 12/18/2017    HGB 10.3 12/18/2017    HCT 31.4 12/18/2017     12/18/2017    MCV 91.2 12/18/2017    MCH 29.9 12/18/2017    MCHC 32.8 12/18/2017    RDW 14.1 12/18/2017    LYMPHOPCT 16 12/18/2017    MONOPCT 8 12/18/2017    BASOPCT 0 12/18/2017    MONOSABS 0.50 12/18/2017    LYMPHSABS 1.00 12/18/2017    EOSABS 0.10 12/18/2017    BASOSABS 0.00 12/18/2017    DIFFTYPE NOT REPORTED 12/18/2017     Hemoglobin/Hematocrit:    Lab Results   Component Value Date    HGB 10.3 12/18/2017    HCT 31.4 12/18/2017     CMP:    Lab Results   Component Value Date     12/18/2017    K 4.1 12/18/2017     12/18/2017    CO2 23 12/18/2017    BUN 41 12/18/2017    CREATININE 0.67 12/18/2017    GFRAA >60 12/18/2017    LABGLOM >60 12/18/2017    GLUCOSE 104 12/18/2017    PROT 5.6 12/18/2017    LABALBU 3.4 12/18/2017    CALCIUM 8.7 12/18/2017    BILITOT 0.71 12/18/2017    ALKPHOS 66

## 2017-12-18 NOTE — OP NOTE
ESOPHAGOGASTRODUODENOSCOPY   ( EGD )  DATE OF PROCEDURE: 12/18/2017     SURGEON: Samantha Tamayo MD    ASSISTANT: None    PREOPERATIVE DIAGNOSIS: gi bleed    POSTOPERATIVE DIAGNOSIS: 1.5 cm antral ulcer,clean base    OPERATION: Upper GI endoscopy with Biopsy    ANESTHESIA: Moderate Sedation     ESTIMATED BLOOD LOSS: None    COMPLICATIONS: None. SPECIMENS:  Was Obtained: gastric for Hp    HISTORY: The patient is a 80y.o. year old female with history of above preop diagnosis. I recommended esophagogastroduodenoscopy with possible biopsy and I explained the risk, benefits, expected outcome, and alternatives to the procedure. Risks included but are not limited to bleeding, infection, respiratory distress, hypotension, and perforation of the esophagus, stomach, or duodenum. Patient understands and is in agreement. PROCEDURE: The patient was given IV conscious sedation. The patient's SPO2 remained above 90% throughout the procedure. Cetacaine spray given. Patient placed in left lateral position. Olympus  videogastroscope was inserted orally under vision into the esophagus without difficulty and advanced into the stomach then through the pylorus up to the second part of duodenum. Findings:    Retropharyngeal area was grossly normal appearing    Esophagus: normal, small HH    Stomach:    Fundus and Cardia Examined in Retroflexed View: normal    Body: normal    Antrum: abnormal: 1.5 cm deep ulcer with clean base in the antrum. No signs of active bleeding. Duodenum:     Descending: normal    Bulb: abnormal: duodenitis    While withdrawing the scope the above findings were verified and the scope was removed. The patient tolerated the procedure well. Recommendations/Plan:   1. F/U Biopsies  2. F/U In Office as instructed  3. Discussed with the family      HISTORY OF PRESENT ILLNESS:       The patient is a 80 y.o. female who presents for the above procedure.         Past Medical History: Past Medical History:   Diagnosis Date    Arthritis     CAD (coronary artery disease)     GERD (gastroesophageal reflux disease)     Hyperlipidemia     Hypertension        Past Surgical History:    Past Surgical History:   Procedure Laterality Date    BACK SURGERY      broke her back    CAROTID ENDARTERECTOMY Right     COLON SURGERY      COLONOSCOPY      DENTAL SURGERY      top dentures    HERNIA REPAIR      X's 2    JOINT REPLACEMENT      TONSILLECTOMY      UPPER GASTROINTESTINAL ENDOSCOPY         Medications:  Current Facility-Administered Medications   Medication Dose Route Frequency Provider Last Rate Last Dose    [MAR Hold] sodium chloride flush 0.9 % injection 10 mL  10 mL Intravenous 2 times per day Derrick Ramos MD   Stopped at 12/18/17 0900    [MAR Hold] sodium chloride flush 0.9 % injection 10 mL  10 mL Intravenous PRN Derrick Ramos MD       [facility] Hold] magnesium hydroxide (MILK OF MAGNESIA) 400 MG/5ML suspension 30 mL  30 mL Oral Daily PRN Derrick Ramos MD       [facility] Hold] fentaNYL (SUBLIMAZE) injection 25 mcg  25 mcg Intravenous Q5 Min PRN Anastasia Ochoa MD        [MAR Hold] HYDROmorphone (DILAUDID) injection 0.5 mg  0.5 mg Intravenous Q5 Min PRN Anastasia Ochoa MD        [facility] Hold] fentaNYL (SUBLIMAZE) injection 25 mcg  25 mcg Intravenous Q5 Min PRN Anastasia Ochoa MD        [MAR Hold] HYDROmorphone (DILAUDID) injection 0.5 mg  0.5 mg Intravenous Q5 Min PRN Anastasia Ochoa MD        [facility] Hold] diphenhydrAMINE (BENADRYL) injection 12.5 mg  12.5 mg Intravenous Once PRN Anastasia Ochoa MD        [MAR Hold] ondansetron (ZOFRAN) injection 4 mg  4 mg Intravenous Once PRN Anastasia Ochoa MD        [facility] Hold] dexamethasone (DECADRON) injection 4 mg  4 mg Intravenous Once PRN Anastasia Ochoa MD        [facility] Hold] labetalol (NORMODYNE;TRANDATE) injection 5 mg  5 mg Intravenous Q10 Min PRN Anastasia Ochoa MD        [facility] Hold] hydrALAZINE (APRESOLINE) injection Abdomen: Soft, non-tender; no masses or HSM   Skin: Normal temperature, turgor and texture; no rash, ulcers or subcutaneous nodules     DATA:  CBC:   Lab Results   Component Value Date    WBC 6.3 12/18/2017    HGB 10.0 (L) 12/18/2017    HCT 30.7 (L) 12/18/2017    MCV 91.2 12/18/2017     12/18/2017     BUN/Cr:   Lab Results   Component Value Date    BUN 41 (H) 12/18/2017   ,   Lab Results   Component Value Date    CREATININE 0.67 12/18/2017     Potassium:   Lab Results   Component Value Date    K 4.1 12/18/2017     PT/INR:   Lab Results   Component Value Date    INR 0.9 12/18/2017    INR 1.0 12/17/2017    INR 1.0 07/17/2013    PROTIME 9.8 12/18/2017    PROTIME 10.7 12/17/2017    PROTIME 10.7 07/17/2013           Richar Strange         Electronically signed by Princess Diaz MD  on 12/18/2017 at 3:33 PM

## 2017-12-18 NOTE — PROGRESS NOTES
Consultation was discussed with the nurse taking care of the patient this morning. Consultation regarding dark stools. GI service on the case. Blood work was reviewed. We'll evaluate the patient later today. Patient is hemodynamically stable per nursing.

## 2017-12-19 LAB
ABO/RH: NORMAL
ABSOLUTE EOS #: 0.2 K/UL (ref 0–0.4)
ABSOLUTE IMMATURE GRANULOCYTE: ABNORMAL K/UL (ref 0–0.3)
ABSOLUTE LYMPH #: 0.9 K/UL (ref 1–4.8)
ABSOLUTE MONO #: 0.4 K/UL (ref 0.2–0.8)
ALBUMIN SERPL-MCNC: 3.1 G/DL (ref 3.5–5.2)
ALBUMIN/GLOBULIN RATIO: ABNORMAL (ref 1–2.5)
ALP BLD-CCNC: 63 U/L (ref 35–104)
ALT SERPL-CCNC: 9 U/L (ref 5–33)
ANION GAP SERPL CALCULATED.3IONS-SCNC: 8 MMOL/L (ref 9–17)
ANTIBODY SCREEN: NEGATIVE
ARM BAND NUMBER: NORMAL
AST SERPL-CCNC: 15 U/L
BASOPHILS # BLD: 0 % (ref 0–2)
BASOPHILS ABSOLUTE: 0 K/UL (ref 0–0.2)
BILIRUB SERPL-MCNC: 0.64 MG/DL (ref 0.3–1.2)
BILIRUBIN DIRECT: 0.2 MG/DL
BILIRUBIN, INDIRECT: 0.44 MG/DL (ref 0–1)
BLD PROD TYP BPU: NORMAL
BLD PROD TYP BPU: NORMAL
BUN BLDV-MCNC: 19 MG/DL (ref 8–23)
BUN/CREAT BLD: 31 (ref 9–20)
CALCIUM SERPL-MCNC: 8.7 MG/DL (ref 8.6–10.4)
CASE NUMBER:: NORMAL
CHLORIDE BLD-SCNC: 109 MMOL/L (ref 98–107)
CO2: 25 MMOL/L (ref 20–31)
CREAT SERPL-MCNC: 0.62 MG/DL (ref 0.5–0.9)
CROSSMATCH RESULT: NORMAL
CROSSMATCH RESULT: NORMAL
DIFFERENTIAL TYPE: ABNORMAL
DISPENSE STATUS BLOOD BANK: NORMAL
DISPENSE STATUS BLOOD BANK: NORMAL
EOSINOPHILS RELATIVE PERCENT: 5 % (ref 1–4)
EXPIRATION DATE: NORMAL
GFR AFRICAN AMERICAN: >60 ML/MIN
GFR NON-AFRICAN AMERICAN: >60 ML/MIN
GFR SERPL CREATININE-BSD FRML MDRD: ABNORMAL ML/MIN/{1.73_M2}
GFR SERPL CREATININE-BSD FRML MDRD: ABNORMAL ML/MIN/{1.73_M2}
GLOBULIN: ABNORMAL G/DL (ref 1.5–3.8)
GLUCOSE BLD-MCNC: 90 MG/DL (ref 70–99)
HCT VFR BLD CALC: 26 % (ref 36–46)
HCT VFR BLD CALC: 27.5 % (ref 36–46)
HCT VFR BLD CALC: 30.1 % (ref 36–46)
HCT VFR BLD CALC: 31 % (ref 36–46)
HEMOGLOBIN: 10.1 G/DL (ref 12–16)
HEMOGLOBIN: 9 G/DL (ref 12–16)
HEMOGLOBIN: 9.3 G/DL (ref 12–16)
HEMOGLOBIN: 9.9 G/DL (ref 12–16)
IMMATURE GRANULOCYTES: ABNORMAL %
INR BLD: 1
LACTIC ACID, WHOLE BLOOD: NORMAL MMOL/L (ref 0.7–2.1)
LACTIC ACID: 0.7 MMOL/L (ref 0.5–2.2)
LYMPHOCYTES # BLD: 19 % (ref 24–44)
MAGNESIUM: 1.7 MG/DL (ref 1.6–2.6)
MCH RBC QN AUTO: 30 PG (ref 26–34)
MCHC RBC AUTO-ENTMCNC: 33.6 G/DL (ref 31–37)
MCV RBC AUTO: 89.3 FL (ref 80–100)
MONOCYTES # BLD: 8 % (ref 1–7)
PARTIAL THROMBOPLASTIN TIME: 23.9 SEC (ref 23–31)
PDW BLD-RTO: 13.8 % (ref 11.5–14.5)
PLATELET # BLD: 163 K/UL (ref 130–400)
PLATELET ESTIMATE: ABNORMAL
PMV BLD AUTO: 8.1 FL (ref 6–12)
POTASSIUM SERPL-SCNC: 3.9 MMOL/L (ref 3.7–5.3)
PROTHROMBIN TIME: 10 SEC (ref 9.7–11.6)
RBC # BLD: 3.08 M/UL (ref 4–5.2)
RBC # BLD: ABNORMAL 10*6/UL
SEG NEUTROPHILS: 68 % (ref 36–66)
SEGMENTED NEUTROPHILS ABSOLUTE COUNT: 3.1 K/UL (ref 1.8–7.7)
SODIUM BLD-SCNC: 142 MMOL/L (ref 135–144)
SPECIMEN DESCRIPTION: NORMAL
TOTAL PROTEIN: 5.2 G/DL (ref 6.4–8.3)
TRANSFUSION STATUS: NORMAL
TRANSFUSION STATUS: NORMAL
UNIT DIVISION: 0
UNIT DIVISION: 0
UNIT NUMBER: NORMAL
UNIT NUMBER: NORMAL
WBC # BLD: 4.6 K/UL (ref 3.5–11)
WBC # BLD: ABNORMAL 10*3/UL

## 2017-12-19 PROCEDURE — 2580000003 HC RX 258: Performed by: INTERNAL MEDICINE

## 2017-12-19 PROCEDURE — 83605 ASSAY OF LACTIC ACID: CPT

## 2017-12-19 PROCEDURE — 80048 BASIC METABOLIC PNL TOTAL CA: CPT

## 2017-12-19 PROCEDURE — 85610 PROTHROMBIN TIME: CPT

## 2017-12-19 PROCEDURE — 36415 COLL VENOUS BLD VENIPUNCTURE: CPT

## 2017-12-19 PROCEDURE — 85730 THROMBOPLASTIN TIME PARTIAL: CPT

## 2017-12-19 PROCEDURE — 85025 COMPLETE CBC W/AUTO DIFF WBC: CPT

## 2017-12-19 PROCEDURE — 80076 HEPATIC FUNCTION PANEL: CPT

## 2017-12-19 PROCEDURE — C9113 INJ PANTOPRAZOLE SODIUM, VIA: HCPCS | Performed by: INTERNAL MEDICINE

## 2017-12-19 PROCEDURE — 2060000000 HC ICU INTERMEDIATE R&B

## 2017-12-19 PROCEDURE — 6360000002 HC RX W HCPCS: Performed by: INTERNAL MEDICINE

## 2017-12-19 PROCEDURE — 83735 ASSAY OF MAGNESIUM: CPT

## 2017-12-19 PROCEDURE — 6370000000 HC RX 637 (ALT 250 FOR IP): Performed by: INTERNAL MEDICINE

## 2017-12-19 PROCEDURE — 85014 HEMATOCRIT: CPT

## 2017-12-19 PROCEDURE — 99233 SBSQ HOSP IP/OBS HIGH 50: CPT | Performed by: INTERNAL MEDICINE

## 2017-12-19 PROCEDURE — 85018 HEMOGLOBIN: CPT

## 2017-12-19 RX ADMIN — ATORVASTATIN CALCIUM 40 MG: 40 TABLET, FILM COATED ORAL at 07:38

## 2017-12-19 RX ADMIN — SODIUM CHLORIDE: 9 INJECTION, SOLUTION INTRAVENOUS at 09:04

## 2017-12-19 RX ADMIN — CITALOPRAM HYDROBROMIDE 20 MG: 20 TABLET ORAL at 07:38

## 2017-12-19 RX ADMIN — SODIUM CHLORIDE: 9 INJECTION, SOLUTION INTRAVENOUS at 18:22

## 2017-12-19 RX ADMIN — SODIUM CHLORIDE 8 MG/HR: 9 INJECTION, SOLUTION INTRAVENOUS at 00:55

## 2017-12-19 RX ADMIN — SODIUM CHLORIDE 8 MG/HR: 9 INJECTION, SOLUTION INTRAVENOUS at 11:22

## 2017-12-19 RX ADMIN — DIGOXIN 125 MCG: 125 TABLET ORAL at 07:38

## 2017-12-19 ASSESSMENT — PAIN SCALES - GENERAL
PAINLEVEL_OUTOF10: 0
PAINLEVEL_OUTOF10: 0

## 2017-12-19 NOTE — CONSULTS
General Surgery Consult      Pt Name: Radhika Flores  MRN: [id number]  [place]: 6/17/1931  Date of evaluation: 12/19/2017  Primary Care Physician: Gwen Barraza CNP   Patient evaluated at the request of  Dr. Pineda   Reason for evaluation: Rectal bleeding    SUBJECTIVE:   History of Chief Complaint:    Radhika Flores is a 80 y.o. female who presents with Bloody bowel movements patient felt weak tired and dizzy patient states the bowel movements or dark patient came to the emergency room. She was found to have a low hemoglobin. Patient takes Aleve off-and-on for arthritis but not on a regular basis. Patient takes baby aspirin. No significant abdominal pain. No hematemesis. No distention no fever or chills. Some weight loss. Loss of appetite to a degree. She is not eating as much. No history of any liver issues. Patient has had history of partial colectomy because of bleeding back in 2006. Patient has had a hernia repair in the past.  Patient's hemoglobin at the time of admission was 7 g. Patient received blood transfusion. Patient is hemodynamically stable. No other systemic symptoms. Symptom onset has been acute for a time period of few day(s). Severity is described as moderate to severe. Course of her symptoms over time is acute. Late entry. Past Medical History   has a past medical history of Arthritis; CAD (coronary artery disease); GERD (gastroesophageal reflux disease); Hyperlipidemia; and Hypertension. Past Surgical History   has a past surgical history that includes Colonoscopy; hernia repair (06/20/2012); joint replacement; Tonsillectomy; Dental surgery; back surgery; Upper gastrointestinal endoscopy; Colon surgery; Carotid endarterectomy (Right); Upper gastrointestinal endoscopy (12/18/2017); and hernia repair (06/18/2013). Medications  Prior to Admission medications    Medication Sig Start Date End Date Taking?  Authorizing Provider   citalopram (CELEXA) of bowel throughout the abdomen and pelvis. Pelvis: No acute abnormality of the pelvis. Normal appearance of the bladder. Peritoneum/Retroperitoneum: No free air, free fluid or abscess. Bones/Soft Tissues: No fracture or other acute osseous process. No abscess. Circumferential wall thickening of the lower rectum not well evaluated due to collapse. This may all be due to collapse although a mass could have this appearance. IMPRESSION:   1. GI bleed  2. Anemia  3. Evidence of peptic ulcer disease without any evidence of active bleeding on upper GI endoscopy done today. Patient hemodynamically stable. 4. Abnormal CT scan of the abdomen and pelvis with some rectosigmoid thickening. 5. Partial colectomy back in 2006 for GI bleed. 6. Previous history of laparotomy and hernia repair. does not have any pertinent problems on file. PLAN:   1. Continue clear liquid diet. 2. Patient is on Protonix drip. 3. Colonoscopy per GI service. 4. Surgery on standby. Thank you for this interesting consult and for allowing us to participate in the care of this patient. If you have any questions please don't hesitate to call.           Electronically signed by Adore Aguilar MD  on 12/19/2017 at 7:00 AM

## 2017-12-19 NOTE — PROGRESS NOTES
Gastroenterology Progress Note    Jessie Washington is a 80 y.o. female patient. Hospitalization day:2    SUBJECTIVE:    Patient seen in ICU around 9 AM.  She was doing well. Denies abdominal pain, bloating, nausea or vomiting. No bloody bowel movements since yesterday. Hemoglobin has been stable. She is tolerating liquid diet without issues. Denies chest pain, shortness of breath, palpitations etc.    Overall she is doing well. Physical    VITALS:  /74   Pulse 66   Temp 98.5 °F (36.9 °C) (Oral)   Resp 17   Ht 5' 3\" (1.6 m)   Wt 179 lb 14.4 oz (81.6 kg)   SpO2 96%   BMI 31.87 kg/m²   TEMPERATURE:  Current - Temp: 98.5 °F (36.9 °C); Max - Temp  Av.4 °F (36.9 °C)  Min: 97 °F (36.1 °C)  Max: 99.9 °F (37.7 °C)    General:  Alert and oriented,  No apparent distress  Skin- without jaundice  Eyes: anicteric sclera  Cardiac: RRR, Nl s1s2, without murmurs  Lungs CTA Bilaterally, normal effort  Abdomen soft, ND, NT, no HSM, Bowel sounds normal  Ext: without edema  Neuro: no asterixis     Data    CBC:   Lab Results   Component Value Date    WBC 4.6 2017    RBC 3.08 2017    HGB 9.3 2017    HCT 27.5 2017    MCV 89.3 2017    MCH 30.0 2017    MCHC 33.6 2017    RDW 13.8 2017     2017    MPV 8.1 2017     Hepatic Function Panel:    Lab Results   Component Value Date    ALKPHOS 63 2017    ALT 9 2017    AST 15 2017    PROT 5.2 2017    BILITOT 0.64 2017    BILIDIR 0.20 2017    IBILI 0.44 2017         Radiology Review:      ASSESSMENT:  Principal Problem:    Gastrointestinal hemorrhage  Active Problems:    Acute blood loss anemia    Acute gastric ulcer with hemorrhage        PLAN:  1.  May transfer out of ICU. 2. Full liquid diet to soft diet. 3. If she does well may be discharged tomorrow. 4. May need EGD in the next 8 weeks. 5. PPI therapy and to avoid NSAIDs.     Thank you for allowing me to participate in the care of your patient. Feel free to contact me with any questions or concerns. Paras Hardwick MD    Note is dictated utilizing voice recognition software. Unfortunately this leads to occasional typographical errors. Please contact our office if you have any questions.

## 2017-12-19 NOTE — PLAN OF CARE
Problem: Falls - Risk of  Goal: Absence of falls  Outcome: Ongoing  Patient remains free from falls    Problem: Fluid Volume - Imbalance:  Goal: Will show no signs and symptoms of excessive bleeding  Will show no signs and symptoms of excessive bleeding   Outcome: Ongoing    Goal: Absence of imbalanced fluid volume signs and symptoms  Absence of imbalanced fluid volume signs and symptoms   Outcome: Ongoing

## 2017-12-19 NOTE — PROGRESS NOTES
Patient was seen and examined this morning. She remains hemodynamically stable. Afebrile. Urine output is adequate. Tolerating clear liquids. Hemoglobin is stable at 9. WBC count is normal.  BMP is unremarkable. Abdomen is benign. Extremity nontender. Continue clear liquid diet. Medical treatment. Colonoscopy per GI service.

## 2017-12-20 VITALS
RESPIRATION RATE: 14 BRPM | WEIGHT: 179.9 LBS | TEMPERATURE: 97.9 F | BODY MASS INDEX: 31.88 KG/M2 | HEIGHT: 63 IN | SYSTOLIC BLOOD PRESSURE: 158 MMHG | HEART RATE: 62 BPM | OXYGEN SATURATION: 99 % | DIASTOLIC BLOOD PRESSURE: 46 MMHG

## 2017-12-20 PROBLEM — I35.0 NONRHEUMATIC AORTIC VALVE STENOSIS: Status: ACTIVE | Noted: 2017-12-20

## 2017-12-20 PROBLEM — I65.23 BILATERAL CAROTID ARTERY STENOSIS: Status: ACTIVE | Noted: 2017-12-20

## 2017-12-20 PROBLEM — I27.20 PULMONARY HYPERTENSION (HCC): Status: ACTIVE | Noted: 2017-12-20

## 2017-12-20 PROBLEM — K92.2 UPPER GI BLEED: Status: ACTIVE | Noted: 2017-12-20

## 2017-12-20 LAB
ABSOLUTE EOS #: 0.3 K/UL (ref 0–0.4)
ABSOLUTE IMMATURE GRANULOCYTE: ABNORMAL K/UL (ref 0–0.3)
ABSOLUTE LYMPH #: 1.1 K/UL (ref 1–4.8)
ABSOLUTE MONO #: 0.5 K/UL (ref 0.2–0.8)
ALBUMIN SERPL-MCNC: 3 G/DL (ref 3.5–5.2)
ALBUMIN/GLOBULIN RATIO: ABNORMAL (ref 1–2.5)
ALP BLD-CCNC: 59 U/L (ref 35–104)
ALT SERPL-CCNC: 10 U/L (ref 5–33)
ANION GAP SERPL CALCULATED.3IONS-SCNC: 8 MMOL/L (ref 9–17)
AST SERPL-CCNC: 16 U/L
BASOPHILS # BLD: 1 % (ref 0–2)
BASOPHILS ABSOLUTE: 0 K/UL (ref 0–0.2)
BILIRUB SERPL-MCNC: 0.37 MG/DL (ref 0.3–1.2)
BILIRUBIN DIRECT: 0.11 MG/DL
BILIRUBIN, INDIRECT: 0.26 MG/DL (ref 0–1)
BUN BLDV-MCNC: 15 MG/DL (ref 8–23)
BUN/CREAT BLD: 20 (ref 9–20)
CALCIUM SERPL-MCNC: 8.6 MG/DL (ref 8.6–10.4)
CHLORIDE BLD-SCNC: 108 MMOL/L (ref 98–107)
CO2: 25 MMOL/L (ref 20–31)
CREAT SERPL-MCNC: 0.74 MG/DL (ref 0.5–0.9)
DIFFERENTIAL TYPE: ABNORMAL
EOSINOPHILS RELATIVE PERCENT: 5 % (ref 1–4)
GFR AFRICAN AMERICAN: >60 ML/MIN
GFR NON-AFRICAN AMERICAN: >60 ML/MIN
GFR SERPL CREATININE-BSD FRML MDRD: ABNORMAL ML/MIN/{1.73_M2}
GFR SERPL CREATININE-BSD FRML MDRD: ABNORMAL ML/MIN/{1.73_M2}
GLOBULIN: ABNORMAL G/DL (ref 1.5–3.8)
GLUCOSE BLD-MCNC: 103 MG/DL (ref 70–99)
HCT VFR BLD CALC: 26.6 % (ref 36–46)
HEMOGLOBIN: 8.7 G/DL (ref 12–16)
IMMATURE GRANULOCYTES: ABNORMAL %
LV EF: 65 %
LVEF MODALITY: NORMAL
LYMPHOCYTES # BLD: 20 % (ref 24–44)
MAGNESIUM: 1.6 MG/DL (ref 1.6–2.6)
MCH RBC QN AUTO: 29.5 PG (ref 26–34)
MCHC RBC AUTO-ENTMCNC: 32.8 G/DL (ref 31–37)
MCV RBC AUTO: 90 FL (ref 80–100)
MONOCYTES # BLD: 8 % (ref 1–7)
PDW BLD-RTO: 13.3 % (ref 11.5–14.5)
PLATELET # BLD: 168 K/UL (ref 130–400)
PLATELET ESTIMATE: ABNORMAL
PMV BLD AUTO: ABNORMAL FL (ref 6–12)
POTASSIUM SERPL-SCNC: 4.3 MMOL/L (ref 3.7–5.3)
RBC # BLD: 2.95 M/UL (ref 4–5.2)
RBC # BLD: ABNORMAL 10*6/UL
SEG NEUTROPHILS: 66 % (ref 36–66)
SEGMENTED NEUTROPHILS ABSOLUTE COUNT: 3.7 K/UL (ref 1.8–7.7)
SODIUM BLD-SCNC: 141 MMOL/L (ref 135–144)
SURGICAL PATHOLOGY REPORT: NORMAL
TOTAL PROTEIN: 4.9 G/DL (ref 6.4–8.3)
WBC # BLD: 5.6 K/UL (ref 3.5–11)
WBC # BLD: ABNORMAL 10*3/UL

## 2017-12-20 PROCEDURE — G8978 MOBILITY CURRENT STATUS: HCPCS

## 2017-12-20 PROCEDURE — 97530 THERAPEUTIC ACTIVITIES: CPT

## 2017-12-20 PROCEDURE — 97535 SELF CARE MNGMENT TRAINING: CPT

## 2017-12-20 PROCEDURE — G8988 SELF CARE GOAL STATUS: HCPCS

## 2017-12-20 PROCEDURE — 6370000000 HC RX 637 (ALT 250 FOR IP): Performed by: INTERNAL MEDICINE

## 2017-12-20 PROCEDURE — 2580000003 HC RX 258: Performed by: INTERNAL MEDICINE

## 2017-12-20 PROCEDURE — 97161 PT EVAL LOW COMPLEX 20 MIN: CPT

## 2017-12-20 PROCEDURE — C9113 INJ PANTOPRAZOLE SODIUM, VIA: HCPCS | Performed by: INTERNAL MEDICINE

## 2017-12-20 PROCEDURE — 80048 BASIC METABOLIC PNL TOTAL CA: CPT

## 2017-12-20 PROCEDURE — G8987 SELF CARE CURRENT STATUS: HCPCS

## 2017-12-20 PROCEDURE — 97110 THERAPEUTIC EXERCISES: CPT

## 2017-12-20 PROCEDURE — 85025 COMPLETE CBC W/AUTO DIFF WBC: CPT

## 2017-12-20 PROCEDURE — 97165 OT EVAL LOW COMPLEX 30 MIN: CPT

## 2017-12-20 PROCEDURE — 6360000002 HC RX W HCPCS: Performed by: INTERNAL MEDICINE

## 2017-12-20 PROCEDURE — 93306 TTE W/DOPPLER COMPLETE: CPT

## 2017-12-20 PROCEDURE — 83735 ASSAY OF MAGNESIUM: CPT

## 2017-12-20 PROCEDURE — 93880 EXTRACRANIAL BILAT STUDY: CPT

## 2017-12-20 PROCEDURE — 99232 SBSQ HOSP IP/OBS MODERATE 35: CPT | Performed by: INTERNAL MEDICINE

## 2017-12-20 PROCEDURE — G8979 MOBILITY GOAL STATUS: HCPCS

## 2017-12-20 PROCEDURE — 36415 COLL VENOUS BLD VENIPUNCTURE: CPT

## 2017-12-20 PROCEDURE — 80076 HEPATIC FUNCTION PANEL: CPT

## 2017-12-20 RX ORDER — METOPROLOL SUCCINATE 25 MG/1
25 TABLET, EXTENDED RELEASE ORAL DAILY
Status: DISCONTINUED | OUTPATIENT
Start: 2017-12-20 | End: 2017-12-20 | Stop reason: HOSPADM

## 2017-12-20 RX ORDER — PANTOPRAZOLE SODIUM 40 MG/1
40 TABLET, DELAYED RELEASE ORAL
Status: DISCONTINUED | OUTPATIENT
Start: 2017-12-20 | End: 2017-12-20 | Stop reason: HOSPADM

## 2017-12-20 RX ORDER — PANTOPRAZOLE SODIUM 40 MG/1
40 TABLET, DELAYED RELEASE ORAL
Qty: 30 TABLET | Refills: 0 | Status: SHIPPED | OUTPATIENT
Start: 2017-12-20 | End: 2018-02-12 | Stop reason: SDUPTHER

## 2017-12-20 RX ADMIN — METOPROLOL SUCCINATE 25 MG: 25 TABLET, FILM COATED, EXTENDED RELEASE ORAL at 08:47

## 2017-12-20 RX ADMIN — ATORVASTATIN CALCIUM 40 MG: 40 TABLET, FILM COATED ORAL at 08:47

## 2017-12-20 RX ADMIN — SODIUM CHLORIDE 8 MG/HR: 9 INJECTION, SOLUTION INTRAVENOUS at 00:14

## 2017-12-20 RX ADMIN — DIGOXIN 125 MCG: 125 TABLET ORAL at 08:47

## 2017-12-20 RX ADMIN — CITALOPRAM HYDROBROMIDE 20 MG: 20 TABLET ORAL at 08:47

## 2017-12-20 ASSESSMENT — PAIN SCALES - GENERAL: PAINLEVEL_OUTOF10: 0

## 2017-12-20 NOTE — CARE COORDINATION
Case Management Initial Discharge Plan  Sushila León,         Readmission Risk              Readmission Risk:        19.5       Age 72 or Greater:  1    Admitted from SNF or Requires Paid or Family Care:  0    Currently has CHF,COPD,ARF,CRI,or is on dialysis:  0    Takes more than 5 Prescription Medications:  4    Takes Digoxin,Insulin,Anticoagulants,Narcotics or ASA/Plavix:  [address] in Past 12 Months:  10    On Disability:  0    Patient Considers own Health:  2.5            Met with:patient to discuss discharge plans. Information verified: address, contacts, phone number, , insurance Yes  PCP: José Miguel Tena CNP  Date of last visit:  5-6 months ago    Insurance Provider: [facility] [facility]    Discharge Planning  Current Residence:   Mobile home  Living Arrangements:  Spouse/Significant Other   Home has 1 stories/5 stairs to climb  Support Systems:  Spouse/Significant Other, Children, Family Members  Current Services PTA:   none Agency:  none  Patient able to perform ADL's:Independent  DME in home:  Walker, cane, w/c, built in shower chair  DME used to aid ambulation prior to admission:    felix Xiong, shower chair  DME used during admission:  unknown    Potential Assistance Needed:  N/A    Pharmacy: [facility] on [address] Medications:  No  Does patient want to participate in local refill/ meds to beds program?  No    Patient agreeable to home care: No  [place] of choice provided:  n/a      Type of Home Care Services:  None  Patient expects to be discharged to:  home    Prior SNF/Rehab Placement and Facility:  none  Agreeable to SNF/Rehab: No  [place] of choice provided: n/a   Evaluation: no    Expected Discharge date:  17  Follow Up Appointment: Best Day/ Time:      Transportation provider:  Self or family  Transportation arrangements needed for discharge: No    Discharge Plan: Met with pt in ICU.     Pt admitted for upper GI bleed.  HGB stable. Dr. Minor Medicine here and pt can be discharged from his standpoint. Pt lives with her spouse in mobile home. Pt has been independent with use of walker in her home. 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 Pt is active . Pt also has 9 children that assist as needed. Pt will discharge home. No needs identified.          Electronically signed by Sam Hernandez RN on 12/20/17 at 9:58 AM

## 2017-12-20 NOTE — PROGRESS NOTES
Physical Therapy    Facility/Department: [facility] ICU  Initial Assessment    NAME: Radhika Flores  : 1931  MRN: [id number]  Discharge Recommendation:   Home with assist PRN     RN reports patient is medically stable for therapy treatment this date. Chart reviewed prior to treatment and patient is agreeable for therapy. All lines intact and patient positioned comfortably at end of treatment. All patient needs addressed prior to ending therapy session. Date of Service: 2017    Patient Diagnosis(es): The primary encounter diagnosis was Gastrointestinal hemorrhage, unspecified gastrointestinal hemorrhage type. A diagnosis of Anemia, unspecified type was also pertinent to this visit. has a past medical history of Arthritis; CAD (coronary artery disease); GERD (gastroesophageal reflux disease); Hyperlipidemia; and Hypertension. has a past surgical history that includes Colonoscopy; hernia repair (2012); joint replacement; Tonsillectomy; Dental surgery; back surgery; Upper gastrointestinal endoscopy; Colon surgery; Carotid endarterectomy (Right); Upper gastrointestinal endoscopy (2017); hernia repair (2013); and egd transoral biopsy single/multiple (N/A, 2017).     Restrictions  Restrictions/Precautions  Restrictions/Precautions: General Precautions, Up Ad Opal, Fall Risk  Vision/Hearing  Vision: Within Functional Limits  Hearing: Exceptions to [facility] Exceptions: Hard of hearing/hearing concerns     Subjective  General  Chart Reviewed: Yes  Patient assessed for rehabilitation services?: Yes  Response To Previous Treatment: Not applicable  Family / Caregiver Present: Yes  Follows Commands: Within Functional Limits  Pain Screening  Patient Currently in Pain: Denies  Vital Signs  Patient Currently in Pain: Denies    Orientation  Orientation  Overall Orientation Status: Within Functional Limits    Social/Functional History  Social/Functional History  Lives With: Spouse  Type of Home: Mobile home  Home Access: Stairs to enter with rails  Entrance Stairs - Number of Steps: 4 at back door   Entrance Stairs - Rails: Both  Bathroom Shower/Tub: Walk-in shower  Bathroom Toilet: Handicap height  Bathroom Equipment: Grab bars in shower, Hand-held shower, Built-in shower seat  Bathroom Accessibility: Accessible  Home Equipment: Rolling walker  Receives Help From: Family (pt has 9 children and she states is supportive )  ADL Assistance: [address]: Needs assistance (spouse assists with cleaning; pt does cook and does laundry )  Homemaking Responsibilities: Yes  Ambulation Assistance: Independent (uses RW )  Transfer Assistance: Independent  Active : Yes (family does assist wih driving at times )  Occupation: Retired  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   Objective     Observation/Palpation  Posture: Good  Edema: RUE due to IV and BLE edema noted     AROM RLE (degrees)  RLE AROM: WFL  AROM LLE (degrees)  LLE AROM : WFL  Strength RLE  Strength RLE: WFL  Strength RUE  Strength RUE: WFL  Motor Control  Gross Motor?: WFL  Sensation  Overall Sensation Status: WFL  Bed mobility  Bridging: Independent  Rolling to Left: Independent  Rolling to Right: Independent  Supine to Sit: Independent  Sit to Supine: Independent  Transfers  Sit to Stand: Stand by assistance  Stand to sit: Stand by assistance  Bed to Chair: Stand by assistance  Stand Pivot Transfers: Stand by assistance  Ambulation  Ambulation?: Yes  Ambulation 1  Surface: level tile  Device: Rolling Walker  Assistance: Stand by assistance  Quality of Gait: Gait safe, steady. Distance: 200 ft x 1 . Comments: Good demonstration on r.walker usage. Balance  Posture: Good  Sitting - Static: Good  Sitting - Dynamic: Good  Standing - Static: Good  Standing - Dynamic: Fair;+  Exercises  Comments: chair push ups x 10 reps.    Standing balance activities with eyes open

## 2017-12-20 NOTE — FLOWSHEET NOTE
Patient sitting in bedside chair finishing breakfast. She engages in brief conversation, sharing that she has been visited by her , and that she hopes to be discharged soon. She does request that writer pray with her, which writer does. Writer also offers supportive conversation. Patient expresses thanks.      12/20/17 1040   Encounter Summary   Services provided to: Patient   Referral/Consult From: Antony   Continue Visiting (12/20/17)   Complexity of Encounter Low   Length of Encounter 15 minutes   Routine   Type Follow up   Assessment Approachable   Intervention Active listening;Prayer   Outcome Engaged in conversation;Expressed gratitude

## 2018-01-23 PROBLEM — K25.3 ANTRAL ULCER, ACUTE: Status: ACTIVE | Noted: 2017-12-18

## 2018-02-09 NOTE — TELEPHONE ENCOUNTER
Pt called and LM stating she would not be at appt today 2/9/18 due to the weather, she was a per Dr Jessica Aranda , I attempted to call back was unable to reach patient.

## 2018-02-12 RX ORDER — PANTOPRAZOLE SODIUM 40 MG/1
40 TABLET, DELAYED RELEASE ORAL
Qty: 30 TABLET | Refills: 2 | Status: SHIPPED | OUTPATIENT
Start: 2018-02-12 | End: 2018-03-20 | Stop reason: SDUPTHER

## 2018-03-09 ENCOUNTER — OFFICE VISIT (OUTPATIENT)
Dept: GASTROENTEROLOGY | Age: 83
End: 2018-03-09
Payer: MEDICARE

## 2018-03-09 ENCOUNTER — HOSPITAL ENCOUNTER (OUTPATIENT)
Age: 83
Discharge: HOME | End: 2018-03-09
Payer: MEDICARE

## 2018-03-09 VITALS — WEIGHT: 179 LBS | BODY MASS INDEX: 31.71 KG/M2

## 2018-03-09 DIAGNOSIS — D62 ACUTE BLOOD LOSS ANEMIA: ICD-10-CM

## 2018-03-09 DIAGNOSIS — K25.3 ANTRAL ULCER, ACUTE: Primary | ICD-10-CM

## 2018-03-09 LAB
HCT VFR BLD CALC: 33.3 % (ref 36–46)
HEMOGLOBIN: 10.6 G/DL (ref 12–16)
MCH RBC QN AUTO: 27.9 PG (ref 26–34)
MCHC RBC AUTO-ENTMCNC: 32 G/DL (ref 31–37)
MCV RBC AUTO: 87.3 FL (ref 80–100)
NRBC AUTOMATED: ABNORMAL PER 100 WBC
PDW BLD-RTO: 15.8 % (ref 11.5–14.5)
PLATELET # BLD: 222 K/UL (ref 130–400)
PMV BLD AUTO: 7.7 FL (ref 6–12)
RBC # BLD: 3.81 M/UL (ref 4–5.2)
WBC # BLD: 5.8 K/UL (ref 3.5–11)

## 2018-03-09 PROCEDURE — G8599 NO ASA/ANTIPLAT THER USE RNG: HCPCS | Performed by: INTERNAL MEDICINE

## 2018-03-09 PROCEDURE — G8484 FLU IMMUNIZE NO ADMIN: HCPCS | Performed by: INTERNAL MEDICINE

## 2018-03-09 PROCEDURE — 1036F TOBACCO NON-USER: CPT | Performed by: INTERNAL MEDICINE

## 2018-03-09 PROCEDURE — 1090F PRES/ABSN URINE INCON ASSESS: CPT | Performed by: INTERNAL MEDICINE

## 2018-03-09 PROCEDURE — 4040F PNEUMOC VAC/ADMIN/RCVD: CPT | Performed by: INTERNAL MEDICINE

## 2018-03-09 PROCEDURE — 1123F ACP DISCUSS/DSCN MKR DOCD: CPT | Performed by: INTERNAL MEDICINE

## 2018-03-09 PROCEDURE — 99214 OFFICE O/P EST MOD 30 MIN: CPT | Performed by: INTERNAL MEDICINE

## 2018-03-09 PROCEDURE — G8427 DOCREV CUR MEDS BY ELIG CLIN: HCPCS | Performed by: INTERNAL MEDICINE

## 2018-03-09 PROCEDURE — G8417 CALC BMI ABV UP PARAM F/U: HCPCS | Performed by: INTERNAL MEDICINE

## 2018-03-09 PROCEDURE — 85027 COMPLETE CBC AUTOMATED: CPT

## 2018-03-09 PROCEDURE — 36415 COLL VENOUS BLD VENIPUNCTURE: CPT

## 2018-03-09 ASSESSMENT — ENCOUNTER SYMPTOMS
RESPIRATORY NEGATIVE: 1
RECTAL PAIN: 0
NAUSEA: 0
ALLERGIC/IMMUNOLOGIC NEGATIVE: 1
CONSTIPATION: 0
BLOOD IN STOOL: 0
VOMITING: 0
ABDOMINAL PAIN: 0
DIARRHEA: 1
ANAL BLEEDING: 0
BACK PAIN: 1
TROUBLE SWALLOWING: 0
ABDOMINAL DISTENTION: 0

## 2018-03-21 RX ORDER — PANTOPRAZOLE SODIUM 40 MG/1
40 TABLET, DELAYED RELEASE ORAL
Qty: 90 TABLET | Refills: 1 | Status: SHIPPED | OUTPATIENT
Start: 2018-03-21 | End: 2019-01-25 | Stop reason: SDUPTHER

## 2018-04-12 ENCOUNTER — HOSPITAL ENCOUNTER (OUTPATIENT)
Age: 83
Setting detail: OUTPATIENT SURGERY
Discharge: HOME | End: 2018-04-12
Attending: INTERNAL MEDICINE | Admitting: INTERNAL MEDICINE
Payer: MEDICARE

## 2018-04-12 VITALS
DIASTOLIC BLOOD PRESSURE: 60 MMHG | HEIGHT: 63 IN | RESPIRATION RATE: 14 BRPM | OXYGEN SATURATION: 96 % | TEMPERATURE: 97.2 F | WEIGHT: 177.4 LBS | HEART RATE: 57 BPM | BODY MASS INDEX: 31.43 KG/M2 | SYSTOLIC BLOOD PRESSURE: 121 MMHG

## 2018-04-12 PROCEDURE — 2580000003 HC RX 258: Performed by: INTERNAL MEDICINE

## 2018-04-12 PROCEDURE — 6360000002 HC RX W HCPCS: Performed by: INTERNAL MEDICINE

## 2018-04-12 PROCEDURE — 7100000010 HC PHASE II RECOVERY - FIRST 15 MIN: Performed by: INTERNAL MEDICINE

## 2018-04-12 PROCEDURE — 6370000000 HC RX 637 (ALT 250 FOR IP): Performed by: INTERNAL MEDICINE

## 2018-04-12 PROCEDURE — 3609012400 HC EGD TRANSORAL BIOPSY SINGLE/MULTIPLE: Performed by: INTERNAL MEDICINE

## 2018-04-12 PROCEDURE — 88305 TISSUE EXAM BY PATHOLOGIST: CPT

## 2018-04-12 PROCEDURE — 7100000011 HC PHASE II RECOVERY - ADDTL 15 MIN: Performed by: INTERNAL MEDICINE

## 2018-04-12 PROCEDURE — 99152 MOD SED SAME PHYS/QHP 5/>YRS: CPT | Performed by: INTERNAL MEDICINE

## 2018-04-12 RX ORDER — SODIUM CHLORIDE, SODIUM LACTATE, POTASSIUM CHLORIDE, CALCIUM CHLORIDE 600; 310; 30; 20 MG/100ML; MG/100ML; MG/100ML; MG/100ML
INJECTION, SOLUTION INTRAVENOUS CONTINUOUS
Status: DISCONTINUED | OUTPATIENT
Start: 2018-04-12 | End: 2018-04-12 | Stop reason: HOSPADM

## 2018-04-12 RX ORDER — LIDOCAINE HYDROCHLORIDE 10 MG/ML
0.5 INJECTION, SOLUTION EPIDURAL; INFILTRATION; INTRACAUDAL; PERINEURAL ONCE
Status: DISCONTINUED | OUTPATIENT
Start: 2018-04-12 | End: 2018-04-12 | Stop reason: HOSPADM

## 2018-04-12 RX ORDER — MIDAZOLAM HYDROCHLORIDE 1 MG/ML
INJECTION INTRAMUSCULAR; INTRAVENOUS PRN
Status: DISCONTINUED | OUTPATIENT
Start: 2018-04-12 | End: 2018-04-12 | Stop reason: HOSPADM

## 2018-04-12 RX ORDER — FENTANYL CITRATE 50 UG/ML
INJECTION, SOLUTION INTRAMUSCULAR; INTRAVENOUS PRN
Status: DISCONTINUED | OUTPATIENT
Start: 2018-04-12 | End: 2018-04-12 | Stop reason: HOSPADM

## 2018-04-12 RX ADMIN — SODIUM CHLORIDE, POTASSIUM CHLORIDE, SODIUM LACTATE AND CALCIUM CHLORIDE: 600; 310; 30; 20 INJECTION, SOLUTION INTRAVENOUS at 09:11

## 2018-04-12 ASSESSMENT — PAIN SCALES - GENERAL
PAINLEVEL_OUTOF10: 0

## 2018-04-14 LAB — SURGICAL PATHOLOGY REPORT: NORMAL

## 2018-04-17 DIAGNOSIS — K25.3 ANTRAL ULCER, ACUTE: ICD-10-CM

## 2018-05-22 ENCOUNTER — OFFICE VISIT (OUTPATIENT)
Dept: GASTROENTEROLOGY | Age: 83
End: 2018-05-22
Payer: MEDICARE

## 2018-05-22 VITALS
HEIGHT: 63 IN | OXYGEN SATURATION: 97 % | DIASTOLIC BLOOD PRESSURE: 60 MMHG | HEART RATE: 58 BPM | BODY MASS INDEX: 31.24 KG/M2 | SYSTOLIC BLOOD PRESSURE: 131 MMHG | WEIGHT: 176.3 LBS

## 2018-05-22 DIAGNOSIS — K25.3 ANTRAL ULCER, ACUTE: Primary | ICD-10-CM

## 2018-05-22 DIAGNOSIS — D62 ACUTE BLOOD LOSS ANEMIA: ICD-10-CM

## 2018-05-22 PROCEDURE — 1036F TOBACCO NON-USER: CPT | Performed by: INTERNAL MEDICINE

## 2018-05-22 PROCEDURE — G8427 DOCREV CUR MEDS BY ELIG CLIN: HCPCS | Performed by: INTERNAL MEDICINE

## 2018-05-22 PROCEDURE — 1123F ACP DISCUSS/DSCN MKR DOCD: CPT | Performed by: INTERNAL MEDICINE

## 2018-05-22 PROCEDURE — G8599 NO ASA/ANTIPLAT THER USE RNG: HCPCS | Performed by: INTERNAL MEDICINE

## 2018-05-22 PROCEDURE — 4040F PNEUMOC VAC/ADMIN/RCVD: CPT | Performed by: INTERNAL MEDICINE

## 2018-05-22 PROCEDURE — G8417 CALC BMI ABV UP PARAM F/U: HCPCS | Performed by: INTERNAL MEDICINE

## 2018-05-22 PROCEDURE — 1090F PRES/ABSN URINE INCON ASSESS: CPT | Performed by: INTERNAL MEDICINE

## 2018-05-22 PROCEDURE — 99213 OFFICE O/P EST LOW 20 MIN: CPT | Performed by: INTERNAL MEDICINE

## 2018-05-22 RX ORDER — ACETAMINOPHEN AND CODEINE PHOSPHATE 300; 30 MG/1; MG/1
1 TABLET ORAL PRN
COMMUNITY
Start: 2018-05-21 | End: 2019-09-14

## 2018-05-22 ASSESSMENT — ENCOUNTER SYMPTOMS
SINUS PAIN: 1
COUGH: 0
SORE THROAT: 0
ABDOMINAL DISTENTION: 0
ALLERGIC/IMMUNOLOGIC NEGATIVE: 1
WHEEZING: 0
CONSTIPATION: 0
NAUSEA: 0
GASTROINTESTINAL NEGATIVE: 1
VOICE CHANGE: 0
ABDOMINAL PAIN: 0
BACK PAIN: 1
RECTAL PAIN: 0
SINUS PRESSURE: 0
TROUBLE SWALLOWING: 0
BLOOD IN STOOL: 0
FACIAL SWELLING: 0
ANAL BLEEDING: 0
RHINORRHEA: 0
DIARRHEA: 0
SHORTNESS OF BREATH: 1
VOMITING: 0

## 2018-07-09 ENCOUNTER — HOSPITAL ENCOUNTER (OUTPATIENT)
Age: 83
Discharge: HOME | End: 2018-07-09
Payer: MEDICARE

## 2018-07-09 LAB
BUN BLDV-MCNC: 31 MG/DL (ref 8–23)
CREAT SERPL-MCNC: 1.24 MG/DL (ref 0.5–0.9)
GFR AFRICAN AMERICAN: 50 ML/MIN
GFR NON-AFRICAN AMERICAN: 41 ML/MIN
GFR SERPL CREATININE-BSD FRML MDRD: ABNORMAL ML/MIN/{1.73_M2}
GFR SERPL CREATININE-BSD FRML MDRD: ABNORMAL ML/MIN/{1.73_M2}

## 2018-07-09 PROCEDURE — 84520 ASSAY OF UREA NITROGEN: CPT

## 2018-07-09 PROCEDURE — 36415 COLL VENOUS BLD VENIPUNCTURE: CPT

## 2018-07-09 PROCEDURE — 82565 ASSAY OF CREATININE: CPT

## 2019-01-31 RX ORDER — PANTOPRAZOLE SODIUM 40 MG/1
TABLET, DELAYED RELEASE ORAL
Qty: 90 TABLET | Refills: 0 | Status: SHIPPED | OUTPATIENT
Start: 2019-01-31 | End: 2019-07-17 | Stop reason: SDUPTHER

## 2019-05-22 RX ORDER — PANTOPRAZOLE SODIUM 40 MG/1
TABLET, DELAYED RELEASE ORAL
Qty: 30 TABLET | Refills: 0 | OUTPATIENT
Start: 2019-05-22

## 2019-07-17 ENCOUNTER — TELEPHONE (OUTPATIENT)
Dept: GASTROENTEROLOGY | Age: 84
End: 2019-07-17

## 2019-07-17 ENCOUNTER — OFFICE VISIT (OUTPATIENT)
Dept: GASTROENTEROLOGY | Age: 84
End: 2019-07-17
Payer: MEDICARE

## 2019-07-17 VITALS
DIASTOLIC BLOOD PRESSURE: 53 MMHG | BODY MASS INDEX: 32.42 KG/M2 | HEART RATE: 58 BPM | SYSTOLIC BLOOD PRESSURE: 149 MMHG | WEIGHT: 183 LBS

## 2019-07-17 DIAGNOSIS — K25.3 ANTRAL ULCER, ACUTE: Primary | ICD-10-CM

## 2019-07-17 PROCEDURE — 1036F TOBACCO NON-USER: CPT | Performed by: INTERNAL MEDICINE

## 2019-07-17 PROCEDURE — 99213 OFFICE O/P EST LOW 20 MIN: CPT | Performed by: INTERNAL MEDICINE

## 2019-07-17 PROCEDURE — G8427 DOCREV CUR MEDS BY ELIG CLIN: HCPCS | Performed by: INTERNAL MEDICINE

## 2019-07-17 PROCEDURE — 4040F PNEUMOC VAC/ADMIN/RCVD: CPT | Performed by: INTERNAL MEDICINE

## 2019-07-17 PROCEDURE — 1123F ACP DISCUSS/DSCN MKR DOCD: CPT | Performed by: INTERNAL MEDICINE

## 2019-07-17 PROCEDURE — G8417 CALC BMI ABV UP PARAM F/U: HCPCS | Performed by: INTERNAL MEDICINE

## 2019-07-17 PROCEDURE — G8599 NO ASA/ANTIPLAT THER USE RNG: HCPCS | Performed by: INTERNAL MEDICINE

## 2019-07-17 PROCEDURE — 1090F PRES/ABSN URINE INCON ASSESS: CPT | Performed by: INTERNAL MEDICINE

## 2019-07-17 RX ORDER — PANTOPRAZOLE SODIUM 40 MG/1
TABLET, DELAYED RELEASE ORAL
Qty: 90 TABLET | Refills: 1 | Status: SHIPPED | OUTPATIENT
Start: 2019-07-17 | End: 2020-04-13

## 2019-07-17 ASSESSMENT — ENCOUNTER SYMPTOMS
BLOOD IN STOOL: 0
DIARRHEA: 1
SORE THROAT: 0
VOMITING: 0
ABDOMINAL DISTENTION: 0
SINUS PRESSURE: 1
CONSTIPATION: 1
ALLERGIC/IMMUNOLOGIC NEGATIVE: 1
TROUBLE SWALLOWING: 1
RECTAL PAIN: 0
VOICE CHANGE: 0
CHOKING: 0
NAUSEA: 0
COUGH: 1
ABDOMINAL PAIN: 1
ANAL BLEEDING: 0
BACK PAIN: 0
WHEEZING: 0

## 2019-07-17 NOTE — PROGRESS NOTES
nervous/anxious. Objective:   Physical Exam   Constitutional: She is oriented to person, place, and time. She appears well-developed and well-nourished. HENT:   Head: Normocephalic and atraumatic. No oral lesions   Eyes: Pupils are equal, round, and reactive to light. Conjunctivae are normal. No scleral icterus. Neck: Normal range of motion. Neck supple. No hepatojugular reflux and no JVD present. No tracheal deviation present. No thyromegaly present. Cardiovascular: Normal rate, regular rhythm, normal heart sounds and intact distal pulses. Pulmonary/Chest: Effort normal and breath sounds normal. No respiratory distress. She has no wheezes. She has no rales. Abdominal: Soft. Bowel sounds are normal. She exhibits no distension, no ascites and no mass. There is no hepatomegaly. There is no tenderness. There is no rebound. No hernia. Musculoskeletal: She exhibits no edema or tenderness. No joint swelling   Lymphadenopathy:     She has no cervical adenopathy. Neurological: She is alert and oriented to person, place, and time. No cranial nerve deficit. Skin: Skin is warm. No bruising, no ecchymosis and no rash noted. No erythema (.diag). Psychiatric: Thought content normal.   Nursing note and vitals reviewed. Assessment:       Diagnosis Orders   1. Antral ulcer, acute             Plan:      Patient appears stable. She does not have any GI issues. She does have dyspeptic symptoms without PPI therapy. For this reason I advised her to take Protonix 20 mg a day. Follow antireflux measures. She is advised to see family physician for follow-up. If she has any further GI issues will be happy to reevaluate her. I have reviewed and agree with the ROS entered by the MA.

## 2019-09-14 ENCOUNTER — HOSPITAL ENCOUNTER (INPATIENT)
Age: 84
LOS: 5 days | Discharge: NURSING, REHAB OR LONG-TERM CARE | DRG: 203 | End: 2019-09-19
Attending: EMERGENCY MEDICINE | Admitting: INTERNAL MEDICINE
Payer: MEDICARE

## 2019-09-14 ENCOUNTER — APPOINTMENT (OUTPATIENT)
Dept: GENERAL RADIOLOGY | Age: 84
DRG: 203 | End: 2019-09-14
Payer: MEDICARE

## 2019-09-14 DIAGNOSIS — I50.9 ACUTE ON CHRONIC CONGESTIVE HEART FAILURE, UNSPECIFIED HEART FAILURE TYPE (HCC): Primary | ICD-10-CM

## 2019-09-14 PROBLEM — I49.5 SINUS NODE DYSFUNCTION (HCC): Status: ACTIVE | Noted: 2018-10-30

## 2019-09-14 PROBLEM — I48.91 ATRIAL FIBRILLATION (HCC): Status: ACTIVE | Noted: 2018-10-10

## 2019-09-14 PROBLEM — Z95.2 S/P TAVR (TRANSCATHETER AORTIC VALVE REPLACEMENT): Status: ACTIVE | Noted: 2018-10-10

## 2019-09-14 PROBLEM — H90.3 SENSORINEURAL HEARING LOSS (SNHL), BILATERAL: Status: ACTIVE | Noted: 2018-01-02

## 2019-09-14 PROBLEM — M19.90 ARTHRITIS: Status: ACTIVE | Noted: 2017-06-26

## 2019-09-14 PROBLEM — R06.02 SOB (SHORTNESS OF BREATH): Status: ACTIVE | Noted: 2019-09-14

## 2019-09-14 PROBLEM — J20.8 ACUTE BRONCHITIS DUE TO OTHER SPECIFIED ORGANISMS: Status: ACTIVE | Noted: 2019-09-14

## 2019-09-14 PROBLEM — F32.5 MAJOR DEPRESSIVE DISORDER WITH SINGLE EPISODE, IN REMISSION (HCC): Status: ACTIVE | Noted: 2017-06-26

## 2019-09-14 PROBLEM — Z87.19 HISTORY OF GI BLEED: Status: ACTIVE | Noted: 2017-12-28

## 2019-09-14 PROBLEM — Z95.0 PACEMAKER: Status: ACTIVE | Noted: 2018-10-10

## 2019-09-14 PROBLEM — I44.2 COMPLETE HEART BLOCK (HCC): Status: ACTIVE | Noted: 2018-10-10

## 2019-09-14 PROBLEM — I10 ESSENTIAL HYPERTENSION: Status: ACTIVE | Noted: 2017-06-26

## 2019-09-14 PROBLEM — E78.2 MIXED HYPERLIPIDEMIA: Status: ACTIVE | Noted: 2017-06-26

## 2019-09-14 LAB
ABSOLUTE EOS #: 0.17 K/UL (ref 0–0.4)
ABSOLUTE IMMATURE GRANULOCYTE: 0 K/UL (ref 0–0.3)
ABSOLUTE LYMPH #: 0.64 K/UL (ref 1–4.8)
ABSOLUTE MONO #: 0.75 K/UL (ref 0.2–0.8)
ADENOVIRUS PCR: NOT DETECTED
ALBUMIN SERPL-MCNC: 3.8 G/DL (ref 3.5–5.2)
ALBUMIN/GLOBULIN RATIO: ABNORMAL (ref 1–2.5)
ALP BLD-CCNC: 113 U/L (ref 35–104)
ALT SERPL-CCNC: 11 U/L (ref 5–33)
ANION GAP SERPL CALCULATED.3IONS-SCNC: 14 MMOL/L (ref 9–17)
AST SERPL-CCNC: 21 U/L
BASOPHILS # BLD: 1 %
BASOPHILS ABSOLUTE: 0.06 K/UL (ref 0–0.2)
BILIRUB SERPL-MCNC: 0.56 MG/DL (ref 0.3–1.2)
BILIRUBIN DIRECT: 0.14 MG/DL
BILIRUBIN, INDIRECT: 0.42 MG/DL (ref 0–1)
BNP INTERPRETATION: ABNORMAL
BORDETELLA PERTUSSIS PCR: NOT DETECTED
BUN BLDV-MCNC: 17 MG/DL (ref 8–23)
BUN/CREAT BLD: 19 (ref 9–20)
CALCIUM SERPL-MCNC: 9.4 MG/DL (ref 8.6–10.4)
CHLAMYDIA PNEUMONIAE BY PCR: NOT DETECTED
CHLORIDE BLD-SCNC: 94 MMOL/L (ref 98–107)
CO2: 23 MMOL/L (ref 20–31)
CORONAVIRUS 229E PCR: NOT DETECTED
CORONAVIRUS HKU1 PCR: NOT DETECTED
CORONAVIRUS NL63 PCR: NOT DETECTED
CORONAVIRUS OC43 PCR: NOT DETECTED
CREAT SERPL-MCNC: 0.9 MG/DL (ref 0.5–0.9)
DIFFERENTIAL TYPE: ABNORMAL
DIGOXIN DATE LAST DOSE: ABNORMAL
DIGOXIN DOSE AMOUNT: ABNORMAL
DIGOXIN DOSE TIME: ABNORMAL
DIGOXIN LEVEL: 0.4 NG/ML (ref 0.5–2)
EOSINOPHILS RELATIVE PERCENT: 3 % (ref 1–4)
GFR AFRICAN AMERICAN: >60 ML/MIN
GFR NON-AFRICAN AMERICAN: 59 ML/MIN
GFR SERPL CREATININE-BSD FRML MDRD: ABNORMAL ML/MIN/{1.73_M2}
GFR SERPL CREATININE-BSD FRML MDRD: ABNORMAL ML/MIN/{1.73_M2}
GLOBULIN: ABNORMAL G/DL (ref 1.5–3.8)
GLUCOSE BLD-MCNC: 108 MG/DL (ref 70–99)
HCT VFR BLD CALC: 37.5 % (ref 36.3–47.1)
HEMOGLOBIN: 11.7 G/DL (ref 11.9–15.1)
HUMAN METAPNEUMOVIRUS PCR: NOT DETECTED
IMMATURE GRANULOCYTES: 0 %
INFLUENZA A BY PCR: NOT DETECTED
INFLUENZA A H1 (2009) PCR: ABNORMAL
INFLUENZA A H1 PCR: ABNORMAL
INFLUENZA A H3 PCR: ABNORMAL
INFLUENZA B BY PCR: NOT DETECTED
LACTIC ACID: 0.9 MMOL/L (ref 0.5–2.2)
LACTIC ACID: 2 MMOL/L (ref 0.5–2.2)
LYMPHOCYTES # BLD: 11 % (ref 24–44)
MCH RBC QN AUTO: 28.3 PG (ref 25.2–33.5)
MCHC RBC AUTO-ENTMCNC: 31.2 G/DL (ref 28.4–34.8)
MCV RBC AUTO: 90.8 FL (ref 82.6–102.9)
MONOCYTES # BLD: 13 % (ref 1–7)
MYCOPLASMA PNEUMONIAE PCR: NOT DETECTED
MYOGLOBIN: 122 NG/ML (ref 25–58)
NRBC AUTOMATED: 0 PER 100 WBC
PARAINFLUENZA 1 PCR: NOT DETECTED
PARAINFLUENZA 2 PCR: NOT DETECTED
PARAINFLUENZA 3 PCR: NOT DETECTED
PARAINFLUENZA 4 PCR: NOT DETECTED
PDW BLD-RTO: 12.9 % (ref 11.8–14.4)
PLATELET # BLD: 170 K/UL (ref 138–453)
PLATELET ESTIMATE: ABNORMAL
PMV BLD AUTO: 9.8 FL (ref 8.1–13.5)
POTASSIUM SERPL-SCNC: 4 MMOL/L (ref 3.7–5.3)
PRO-BNP: 2697 PG/ML
PROCALCITONIN: 0.08 NG/ML
RBC # BLD: 4.13 M/UL (ref 3.95–5.11)
RBC # BLD: ABNORMAL 10*6/UL
RESP SYNCYTIAL VIRUS PCR: NOT DETECTED
RHINO/ENTEROVIRUS PCR: DETECTED
SEG NEUTROPHILS: 72 % (ref 36–66)
SEGMENTED NEUTROPHILS ABSOLUTE COUNT: 4.18 K/UL (ref 1.8–7.7)
SODIUM BLD-SCNC: 131 MMOL/L (ref 135–144)
SPECIMEN DESCRIPTION: ABNORMAL
TOTAL PROTEIN: 6.6 G/DL (ref 6.4–8.3)
TROPONIN INTERP: ABNORMAL
TROPONIN T: ABNORMAL NG/ML
TROPONIN, HIGH SENSITIVITY: 22 NG/L (ref 0–14)
TROPONIN, HIGH SENSITIVITY: 23 NG/L (ref 0–14)
TROPONIN, HIGH SENSITIVITY: 23 NG/L (ref 0–14)
TSH SERPL DL<=0.05 MIU/L-ACNC: 3.5 MIU/L (ref 0.3–5)
WBC # BLD: 5.8 K/UL (ref 3.5–11.3)
WBC # BLD: ABNORMAL 10*3/UL

## 2019-09-14 PROCEDURE — 87633 RESP VIRUS 12-25 TARGETS: CPT

## 2019-09-14 PROCEDURE — 6370000000 HC RX 637 (ALT 250 FOR IP): Performed by: INTERNAL MEDICINE

## 2019-09-14 PROCEDURE — 51701 INSERT BLADDER CATHETER: CPT

## 2019-09-14 PROCEDURE — 1200000000 HC SEMI PRIVATE

## 2019-09-14 PROCEDURE — 83605 ASSAY OF LACTIC ACID: CPT

## 2019-09-14 PROCEDURE — 99222 1ST HOSP IP/OBS MODERATE 55: CPT | Performed by: INTERNAL MEDICINE

## 2019-09-14 PROCEDURE — 93005 ELECTROCARDIOGRAM TRACING: CPT | Performed by: NURSE PRACTITIONER

## 2019-09-14 PROCEDURE — 85025 COMPLETE CBC W/AUTO DIFF WBC: CPT

## 2019-09-14 PROCEDURE — 36415 COLL VENOUS BLD VENIPUNCTURE: CPT

## 2019-09-14 PROCEDURE — 71045 X-RAY EXAM CHEST 1 VIEW: CPT

## 2019-09-14 PROCEDURE — 87581 M.PNEUMON DNA AMP PROBE: CPT

## 2019-09-14 PROCEDURE — 87486 CHLMYD PNEUM DNA AMP PROBE: CPT

## 2019-09-14 PROCEDURE — 83880 ASSAY OF NATRIURETIC PEPTIDE: CPT

## 2019-09-14 PROCEDURE — 87040 BLOOD CULTURE FOR BACTERIA: CPT

## 2019-09-14 PROCEDURE — 93970 EXTREMITY STUDY: CPT

## 2019-09-14 PROCEDURE — 87798 DETECT AGENT NOS DNA AMP: CPT

## 2019-09-14 PROCEDURE — 2580000003 HC RX 258: Performed by: NURSE PRACTITIONER

## 2019-09-14 PROCEDURE — 6370000000 HC RX 637 (ALT 250 FOR IP): Performed by: NURSE PRACTITIONER

## 2019-09-14 PROCEDURE — 84443 ASSAY THYROID STIM HORMONE: CPT

## 2019-09-14 PROCEDURE — 84145 PROCALCITONIN (PCT): CPT

## 2019-09-14 PROCEDURE — 80162 ASSAY OF DIGOXIN TOTAL: CPT

## 2019-09-14 PROCEDURE — 80076 HEPATIC FUNCTION PANEL: CPT

## 2019-09-14 PROCEDURE — 80048 BASIC METABOLIC PNL TOTAL CA: CPT

## 2019-09-14 PROCEDURE — 83874 ASSAY OF MYOGLOBIN: CPT

## 2019-09-14 PROCEDURE — 84484 ASSAY OF TROPONIN QUANT: CPT

## 2019-09-14 PROCEDURE — 99284 EMERGENCY DEPT VISIT MOD MDM: CPT

## 2019-09-14 RX ORDER — ATORVASTATIN CALCIUM 40 MG/1
40 TABLET, FILM COATED ORAL NIGHTLY
Status: DISCONTINUED | OUTPATIENT
Start: 2019-09-14 | End: 2019-09-19 | Stop reason: HOSPADM

## 2019-09-14 RX ORDER — HYDROCODONE BITARTRATE AND ACETAMINOPHEN 5; 325 MG/1; MG/1
1 TABLET ORAL EVERY 6 HOURS PRN
Status: DISCONTINUED | OUTPATIENT
Start: 2019-09-14 | End: 2019-09-19 | Stop reason: HOSPADM

## 2019-09-14 RX ORDER — AMOXICILLIN AND CLAVULANATE POTASSIUM 500; 125 MG/1; MG/1
1 TABLET, FILM COATED ORAL 2 TIMES DAILY
COMMUNITY
Start: 2019-09-11 | End: 2019-09-14

## 2019-09-14 RX ORDER — DIGOXIN 125 MCG
1 TABLET ORAL DAILY
Status: DISCONTINUED | OUTPATIENT
Start: 2019-09-14 | End: 2019-09-14

## 2019-09-14 RX ORDER — PANTOPRAZOLE SODIUM 40 MG/1
40 TABLET, DELAYED RELEASE ORAL
Status: DISCONTINUED | OUTPATIENT
Start: 2019-09-15 | End: 2019-09-19 | Stop reason: HOSPADM

## 2019-09-14 RX ORDER — SODIUM CHLORIDE 0.9 % (FLUSH) 0.9 %
10 SYRINGE (ML) INJECTION EVERY 12 HOURS SCHEDULED
Status: DISCONTINUED | OUTPATIENT
Start: 2019-09-14 | End: 2019-09-19 | Stop reason: HOSPADM

## 2019-09-14 RX ORDER — ATORVASTATIN CALCIUM 40 MG/1
40 TABLET, FILM COATED ORAL DAILY
Status: DISCONTINUED | OUTPATIENT
Start: 2019-09-15 | End: 2019-09-14

## 2019-09-14 RX ORDER — ACETAMINOPHEN 325 MG/1
650 TABLET ORAL EVERY 4 HOURS PRN
Status: DISCONTINUED | OUTPATIENT
Start: 2019-09-14 | End: 2019-09-19 | Stop reason: HOSPADM

## 2019-09-14 RX ORDER — CLOPIDOGREL BISULFATE 75 MG/1
75 TABLET ORAL DAILY
Status: ON HOLD | COMMUNITY
End: 2022-04-14 | Stop reason: ALTCHOICE

## 2019-09-14 RX ORDER — M-VIT,TX,IRON,MINS/CALC/FOLIC 27MG-0.4MG
1 TABLET ORAL DAILY
Status: DISCONTINUED | OUTPATIENT
Start: 2019-09-15 | End: 2019-09-19 | Stop reason: HOSPADM

## 2019-09-14 RX ORDER — ONDANSETRON 2 MG/ML
4 INJECTION INTRAMUSCULAR; INTRAVENOUS EVERY 6 HOURS PRN
Status: DISCONTINUED | OUTPATIENT
Start: 2019-09-14 | End: 2019-09-19 | Stop reason: HOSPADM

## 2019-09-14 RX ORDER — SODIUM CHLORIDE 0.9 % (FLUSH) 0.9 %
10 SYRINGE (ML) INJECTION PRN
Status: DISCONTINUED | OUTPATIENT
Start: 2019-09-14 | End: 2019-09-19 | Stop reason: HOSPADM

## 2019-09-14 RX ORDER — CLOPIDOGREL BISULFATE 75 MG/1
75 TABLET ORAL DAILY
Status: DISCONTINUED | OUTPATIENT
Start: 2019-09-14 | End: 2019-09-19 | Stop reason: HOSPADM

## 2019-09-14 RX ORDER — METOPROLOL SUCCINATE 25 MG/1
25 TABLET, EXTENDED RELEASE ORAL DAILY
Status: DISCONTINUED | OUTPATIENT
Start: 2019-09-15 | End: 2019-09-19 | Stop reason: HOSPADM

## 2019-09-14 RX ORDER — CITALOPRAM 20 MG/1
20 TABLET ORAL DAILY
Status: DISCONTINUED | OUTPATIENT
Start: 2019-09-14 | End: 2019-09-19 | Stop reason: HOSPADM

## 2019-09-14 RX ORDER — ONDANSETRON 4 MG/1
4 TABLET, ORALLY DISINTEGRATING ORAL EVERY 6 HOURS PRN
Status: DISCONTINUED | OUTPATIENT
Start: 2019-09-14 | End: 2019-09-19 | Stop reason: HOSPADM

## 2019-09-14 RX ORDER — ONDANSETRON 2 MG/ML
4 INJECTION INTRAMUSCULAR; INTRAVENOUS EVERY 6 HOURS PRN
Status: DISCONTINUED | OUTPATIENT
Start: 2019-09-14 | End: 2019-09-14

## 2019-09-14 RX ORDER — FUROSEMIDE 10 MG/ML
40 INJECTION INTRAMUSCULAR; INTRAVENOUS 2 TIMES DAILY
Status: DISCONTINUED | OUTPATIENT
Start: 2019-09-14 | End: 2019-09-14

## 2019-09-14 RX ORDER — AZITHROMYCIN 250 MG/1
500 TABLET, FILM COATED ORAL DAILY
Status: COMPLETED | OUTPATIENT
Start: 2019-09-14 | End: 2019-09-14

## 2019-09-14 RX ORDER — SODIUM CHLORIDE 9 MG/ML
INJECTION, SOLUTION INTRAVENOUS CONTINUOUS
Status: DISCONTINUED | OUTPATIENT
Start: 2019-09-14 | End: 2019-09-15

## 2019-09-14 RX ORDER — CITALOPRAM 20 MG/1
20 TABLET ORAL DAILY
Status: DISCONTINUED | OUTPATIENT
Start: 2019-09-15 | End: 2019-09-14

## 2019-09-14 RX ORDER — 0.9 % SODIUM CHLORIDE 0.9 %
250 INTRAVENOUS SOLUTION INTRAVENOUS ONCE
Status: DISCONTINUED | OUTPATIENT
Start: 2019-09-14 | End: 2019-09-14

## 2019-09-14 RX ORDER — LISINOPRIL 10 MG/1
10 TABLET ORAL DAILY
Status: DISCONTINUED | OUTPATIENT
Start: 2019-09-15 | End: 2019-09-19 | Stop reason: HOSPADM

## 2019-09-14 RX ORDER — ACETAMINOPHEN 325 MG/1
650 TABLET ORAL ONCE
Status: COMPLETED | OUTPATIENT
Start: 2019-09-14 | End: 2019-09-14

## 2019-09-14 RX ORDER — AZITHROMYCIN 250 MG/1
250 TABLET, FILM COATED ORAL DAILY
Status: DISCONTINUED | OUTPATIENT
Start: 2019-09-15 | End: 2019-09-15

## 2019-09-14 RX ADMIN — ACETAMINOPHEN 650 MG: 325 TABLET ORAL at 14:01

## 2019-09-14 RX ADMIN — AZITHROMYCIN MONOHYDRATE 500 MG: 250 TABLET ORAL at 18:22

## 2019-09-14 RX ADMIN — ATORVASTATIN CALCIUM 40 MG: 40 TABLET, FILM COATED ORAL at 20:25

## 2019-09-14 RX ADMIN — SODIUM CHLORIDE 100 ML/HR: 9 INJECTION, SOLUTION INTRAVENOUS at 13:00

## 2019-09-14 ASSESSMENT — ENCOUNTER SYMPTOMS
DIARRHEA: 0
COUGH: 0
VOMITING: 0
COLOR CHANGE: 1
NAUSEA: 0
SORE THROAT: 0
PHOTOPHOBIA: 0
RHINORRHEA: 0
SINUS PRESSURE: 0
EYE PAIN: 0
ABDOMINAL PAIN: 0
SHORTNESS OF BREATH: 1
BACK PAIN: 0

## 2019-09-14 ASSESSMENT — PAIN SCALES - GENERAL: PAINLEVEL_OUTOF10: 0

## 2019-09-14 NOTE — ED PROVIDER NOTES
Team [address] ED  eMERGENCY dEPARTMENT eNCOUnter      Pt Name: Chary Marshall  MRN: [id number]  Damiengfgema 6/17/1931  Date of evaluation: 9/14/2019  Provider: TASHA Mattson CNP    CHIEF COMPLAINT       Chief Complaint   Patient presents with    Fatigue         HISTORY OF PRESENT ILLNESS  (Location/Symptom, Timing/Onset, Context/Setting, Quality, Duration, Modifying Factors, Severity.)   Chary Marshall is a 80 y.o. female who presents to the emergency department via EMS for leg weakness, fatigue. States her leg have been weak for the past few days. Denies fever, chills, cough, dizziness, syncope, chest pain, cough, edema, abdominal pain, N/V/D, urinary symptoms. Reports SOB. She has a history of CHF, a-fib. She has a pacemaker. Rates her pain 0/10 at this time. Denies head injury, neck and back pain. Nursing Notes were reviewed. ALLERGIES     Patient has no known allergies. CURRENT MEDICATIONS       Previous Medications    ACETAMINOPHEN-CODEINE (TYLENOL #3) 300-30 MG PER TABLET    1 tablet as needed. .    AMOXICILLIN-CLAVULANATE (AUGMENTIN) 500-125 MG PER TABLET    Take 1 tablet by mouth 2 times daily Indications: sinsusitis    ATORVASTATIN (LIPITOR) 40 MG TABLET    Take 1 tablet by mouth daily    CALCIUM CARBONATE-VITAMIN D (CALTRATE 600+D PO)    Take 1 tablet by mouth 2 times daily. CITALOPRAM (CELEXA) 20 MG TABLET    TAKE ONE TABLET BY MOUTH DAILY    DIGOXIN (LANOXIN) 125 MCG TABLET    TAKE ONE TABLET BY MOUTH DAILY    FUROSEMIDE (LASIX) 20 MG TABLET    TAKE ONE TABLET BY MOUTH EVERY MORNING    LISINOPRIL (PRINIVIL;ZESTRIL) 10 MG TABLET    TAKE ONE TABLET BY MOUTH DAILY    METOPROLOL SUCCINATE (TOPROL XL) 25 MG EXTENDED RELEASE TABLET    TAKE ONE TABLET BY MOUTH DAILY    MULTIPLE VITAMINS-MINERALS (CENTRUM SILVER ADULT 50+) TABS    Take 1 tablet by mouth daily.     PANTOPRAZOLE (PROTONIX) 40 MG TABLET    TAKE ONE TABLET BY MOUTH EVERY MORNING BEFORE BREAKFAST

## 2019-09-14 NOTE — H&P
[facility]    HISTORY AND PHYSICAL EXAMINATION            Date:   9/14/2019  Patient name:  Manuel Krause  Date of admission:  9/14/2019 10:39 AM  MRN:   [id number]  Account:  [de-identified]  YOB: 1931  PCP:    TASHA Lynch CNP  Room:   [facility]/1011-02  Code Status:    Full Code    Chief Complaint:     Chief Complaint   Patient presents with    Fatigue       History Obtained From:     patient, electronic medical record, Quality of history:  poor historian    History of Present Illness:     Manuel Krause is a 80 y.o.  female who presents with Fatigue   and is admitted to the hospital for the management of Acute bronchitis due to other specified organisms. This 80 yof who is a poor historian, presents with generalized weakness and fatigue. She has had sob and bay and also had chills/sweats and shakes at home and low grade fever here. She has had a cough productive of green phlegm.   She also notes about a 12# weight gain in last several months    Past Medical History:     Past Medical History:   Diagnosis Date    Antral ulcer, acute 12/18/2017    per EGD    Arthritis     CAD (coronary artery disease)     GERD (gastroesophageal reflux disease)     Hyperlipidemia     Hypertension         Past Surgical History:     Past Surgical History:   Procedure Laterality Date    AORTIC VALVE REPAIR      tavr    BACK SURGERY      broke her back    CAROTID ENDARTERECTOMY Right     COLON SURGERY      COLONOSCOPY      DENTAL SURGERY      top dentures    DENTAL SURGERY  05/21/2018    6 teeth removed    HERNIA REPAIR  06/20/2012    HERNIA REPAIR  06/18/2013    JOINT REPLACEMENT      [place] EGD TRANSORAL BIOPSY SINGLE/MULTIPLE N/A 12/18/2017    EGD BIOPSY performed by Braden Banks MD at [place] [address] ENDOSCOPY  12/18/2017 1.5 cm antral ulcer,clean base    UPPER GASTROINTESTINAL ENDOSCOPY N/A 4/12/2018    EGD BIOPSY performed by Vika Christina MD at [place] [address]  04/12/2018    Ulcer appears to be almost healed. There is some inflammation at the site of ulcer. Medications Prior to Admission:     Prior to Admission medications    Medication Sig Start Date End Date Taking? Authorizing Provider   clopidogrel (PLAVIX) 75 MG tablet Take 75 mg by mouth daily   Yes Historical Provider, MD   pantoprazole (PROTONIX) 40 MG tablet TAKE ONE TABLET BY MOUTH EVERY MORNING BEFORE BREAKFAST 7/17/19  Yes Vika Christina MD   citalopram (CELEXA) 20 MG tablet TAKE ONE TABLET BY MOUTH DAILY 4/20/17  Yes Ella Keita MD   atorvastatin (LIPITOR) 40 MG tablet Take 1 tablet by mouth daily 1/10/17  Yes Ella Keita MD   lisinopril (PRINIVIL;ZESTRIL) 10 MG tablet TAKE ONE TABLET BY MOUTH DAILY 1/10/17  Yes Ella Keita MD   furosemide (LASIX) 20 MG tablet TAKE ONE TABLET BY MOUTH EVERY MORNING 12/23/16  Yes Ella Keita MD   metoprolol succinate (TOPROL XL) 25 MG extended release tablet TAKE ONE TABLET BY MOUTH DAILY 12/16/16  Yes Ella Keita MD   Calcium Carbonate-Vitamin D (CALTRATE 600+D PO) Take 1 tablet by mouth 2 times daily. Yes Historical Provider, MD   Multiple Vitamins-Minerals (CENTRUM SILVER ADULT 50+) TABS Take 1 tablet by mouth daily. Yes Historical Provider, MD        Allergies:     Patient has no known allergies. Social History:     Tobacco:    reports that she has never smoked. She has never used smokeless tobacco.  Alcohol:      reports that she does not drink alcohol. Drug Use:  reports that she does not use drugs.     Family History:     Family History   Problem Relation Age of Onset    Heart Disease Mother     Heart Disease Sister     Heart Disease Brother        Review of Systems:     Positive and Negative as described in HPI. CONSTITUTIONAL:  negative for weight loss  HEENT:  negative for vision, hearing changes, runny nose, throat pain  RESPIRATORY:  negative for wheezing  CARDIOVASCULAR:  negative for chest pain, palpitations  GASTROINTESTINAL:  negative for nausea, vomiting, diarrhea, constipation, change in bowel habits, abdominal pain   GENITOURINARY:  negative for difficulty of urination, burning with urination, frequency   INTEGUMENT:  negative for rash, skin lesions, easy bruising   HEMATOLOGIC/LYMPHATIC:  negative for swelling/edema   ALLERGIC/IMMUNOLOGIC:  negative for urticaria , itching  ENDOCRINE:  negative increase in drinking, increase in urination, hot or cold intolerance  MUSCULOSKELETAL:  negative swelling of joints  NEUROLOGICAL:  negative for headaches, dizziness, lightheadedness, numbness, pain, tingling extremities  BEHAVIOR/PSYCH:  negative for depression, anxiety    Physical Exam:   BP (!) 111/33   Pulse 65   Temp 98.2 °F (36.8 °C) (Oral)   Resp 16   Ht 5' 3\" (1.6 m)   Wt 183 lb (83 kg)   SpO2 97%   BMI 32.42 kg/m²   Temp (24hrs), Av.2 °F (37.3 °C), Min:98.2 °F (36.8 °C), Max:100.2 °F (37.9 °C)    No results for input(s): POCGLU in the last 72 hours.   No intake or output data in the 24 hours ending 19 1715    General Appearance:  alert, well appearing, and in no acute distress  Mental status: oriented to person, place, and time  Head:  normocephalic, atraumatic  Eye: no icterus, redness, pupils equal and reactive, extraocular eye movements intact, conjunctiva clear  Ear: normal external ear, no discharge, hearing intact  Nose:  no drainage noted  Mouth: mucous membranes moist  Neck: supple, no carotid bruits, thyroid not palpable  Lungs: Bilateral equal air entry, clear to auscultation, no wheezing, rales or rhonchi, normal effort  Cardiovascular: normal rate, regular rhythm, no gallop, rub; positive aortic murmur  Abdomen: Soft, nontender, nondistended, normal bowel sounds, no

## 2019-09-14 NOTE — ED NOTES
PT to ED with fatigue and weakness pt states last last two days she has not been able to get up and walk, pt is alert and oriented, appears shaky and has redness and warmth to her legs bilat, petal pulses are strong bilat.      Jah Concepcion RN  09/14/19 6208

## 2019-09-15 ENCOUNTER — APPOINTMENT (OUTPATIENT)
Dept: GENERAL RADIOLOGY | Age: 84
DRG: 203 | End: 2019-09-15
Payer: MEDICARE

## 2019-09-15 PROBLEM — J20.6 ACUTE BRONCHITIS DUE TO RHINOVIRUS: Status: ACTIVE | Noted: 2019-09-14

## 2019-09-15 LAB
ANION GAP SERPL CALCULATED.3IONS-SCNC: 10 MMOL/L (ref 9–17)
BUN BLDV-MCNC: 18 MG/DL (ref 8–23)
BUN/CREAT BLD: 20 (ref 9–20)
CALCIUM SERPL-MCNC: 8.8 MG/DL (ref 8.6–10.4)
CHLORIDE BLD-SCNC: 96 MMOL/L (ref 98–107)
CHOLESTEROL/HDL RATIO: 3.3
CHOLESTEROL: 128 MG/DL
CO2: 23 MMOL/L (ref 20–31)
CREAT SERPL-MCNC: 0.91 MG/DL (ref 0.5–0.9)
GFR AFRICAN AMERICAN: >60 ML/MIN
GFR NON-AFRICAN AMERICAN: 58 ML/MIN
GFR SERPL CREATININE-BSD FRML MDRD: ABNORMAL ML/MIN/{1.73_M2}
GFR SERPL CREATININE-BSD FRML MDRD: ABNORMAL ML/MIN/{1.73_M2}
GLUCOSE BLD-MCNC: 109 MG/DL (ref 70–99)
HCT VFR BLD CALC: 34.6 % (ref 36.3–47.1)
HDLC SERPL-MCNC: 39 MG/DL
HEMOGLOBIN: 10.9 G/DL (ref 11.9–15.1)
LDL CHOLESTEROL: 77 MG/DL (ref 0–130)
MAGNESIUM: 1.6 MG/DL (ref 1.6–2.6)
MCH RBC QN AUTO: 28.2 PG (ref 25.2–33.5)
MCHC RBC AUTO-ENTMCNC: 31.5 G/DL (ref 28.4–34.8)
MCV RBC AUTO: 89.4 FL (ref 82.6–102.9)
NRBC AUTOMATED: 0 PER 100 WBC
PDW BLD-RTO: 12.9 % (ref 11.8–14.4)
PLATELET # BLD: 142 K/UL (ref 138–453)
PMV BLD AUTO: 9.4 FL (ref 8.1–13.5)
POTASSIUM SERPL-SCNC: 4.5 MMOL/L (ref 3.7–5.3)
RBC # BLD: 3.87 M/UL (ref 3.95–5.11)
SODIUM BLD-SCNC: 129 MMOL/L (ref 135–144)
TRIGL SERPL-MCNC: 62 MG/DL
VLDLC SERPL CALC-MCNC: ABNORMAL MG/DL (ref 1–30)
WBC # BLD: 4.1 K/UL (ref 3.5–11.3)

## 2019-09-15 PROCEDURE — 83735 ASSAY OF MAGNESIUM: CPT

## 2019-09-15 PROCEDURE — 71045 X-RAY EXAM CHEST 1 VIEW: CPT

## 2019-09-15 PROCEDURE — 80061 LIPID PANEL: CPT

## 2019-09-15 PROCEDURE — 6360000002 HC RX W HCPCS: Performed by: CLINICAL NURSE SPECIALIST

## 2019-09-15 PROCEDURE — 6370000000 HC RX 637 (ALT 250 FOR IP): Performed by: INTERNAL MEDICINE

## 2019-09-15 PROCEDURE — 85027 COMPLETE CBC AUTOMATED: CPT

## 2019-09-15 PROCEDURE — 96372 THER/PROPH/DIAG INJ SC/IM: CPT

## 2019-09-15 PROCEDURE — 6370000000 HC RX 637 (ALT 250 FOR IP): Performed by: CLINICAL NURSE SPECIALIST

## 2019-09-15 PROCEDURE — 1200000000 HC SEMI PRIVATE

## 2019-09-15 PROCEDURE — 2580000003 HC RX 258: Performed by: CLINICAL NURSE SPECIALIST

## 2019-09-15 PROCEDURE — 80048 BASIC METABOLIC PNL TOTAL CA: CPT

## 2019-09-15 PROCEDURE — 99232 SBSQ HOSP IP/OBS MODERATE 35: CPT | Performed by: INTERNAL MEDICINE

## 2019-09-15 PROCEDURE — 36415 COLL VENOUS BLD VENIPUNCTURE: CPT

## 2019-09-15 RX ADMIN — ACETAMINOPHEN 650 MG: 325 TABLET ORAL at 12:43

## 2019-09-15 RX ADMIN — ENOXAPARIN SODIUM 40 MG: 40 INJECTION SUBCUTANEOUS at 08:15

## 2019-09-15 RX ADMIN — ACETAMINOPHEN 650 MG: 325 TABLET ORAL at 23:53

## 2019-09-15 RX ADMIN — ATORVASTATIN CALCIUM 40 MG: 40 TABLET, FILM COATED ORAL at 20:07

## 2019-09-15 RX ADMIN — CLOPIDOGREL 75 MG: 75 TABLET, FILM COATED ORAL at 08:15

## 2019-09-15 RX ADMIN — METOPROLOL SUCCINATE 25 MG: 25 TABLET, FILM COATED, EXTENDED RELEASE ORAL at 08:15

## 2019-09-15 RX ADMIN — CITALOPRAM HYDROBROMIDE 20 MG: 20 TABLET ORAL at 08:15

## 2019-09-15 RX ADMIN — SODIUM CHLORIDE, PRESERVATIVE FREE 10 ML: 5 INJECTION INTRAVENOUS at 08:15

## 2019-09-15 RX ADMIN — PANTOPRAZOLE SODIUM 40 MG: 40 TABLET, DELAYED RELEASE ORAL at 08:15

## 2019-09-15 RX ADMIN — MULTIPLE VITAMINS W/ MINERALS TAB 1 TABLET: TAB at 08:15

## 2019-09-15 RX ADMIN — LISINOPRIL 10 MG: 10 TABLET ORAL at 08:15

## 2019-09-15 RX ADMIN — AZITHROMYCIN 250 MG: 250 TABLET, FILM COATED ORAL at 08:15

## 2019-09-15 ASSESSMENT — PAIN SCALES - GENERAL
PAINLEVEL_OUTOF10: 2
PAINLEVEL_OUTOF10: 0
PAINLEVEL_OUTOF10: 7

## 2019-09-15 NOTE — PLAN OF CARE
[facility]    Second Visit Note  For more detailed information please refer to the progress note of the day      9/15/2019    4:32 PM    Name:   Remberto Rader  MRN:     [id number]     Acct:      [de-identified]   Room:   1011/1011-02   Day:  1  Admit Date:  9/14/2019 10:39 AM    PCP:   TASHA Lyon CNP  Code Status:  Full Code        Pt vitals were reviewed   New labs were reviewed   Patient was seen    Updated plan :     1. Asleep  2.  Awaiting therapy eval to see if safe for discharge or not        Shamar ARGUELLO Blood, DO  9/15/2019  4:32 PM

## 2019-09-15 NOTE — PROGRESS NOTES
--   --  39*   LDLCHOLESTEROL  --   --  77   CHOLHDLRATIO  --   --  3.3   TRIG  --   --  62   VLDL  --   --  NOT REPORTED     ABG:No results found for: POCPH, PHART, PH, POCPCO2, PVU7SHD, PCO2, POCPO2, PO2ART, PO2, POCHCO3, OHG0LLI, HCO3, NBEA, PBEA, BEART, BE, THGBART, THB, VFO4VIF, FHBS8HOX, B6QKDVRK, O2SAT, FIO2  Lab Results   Component Value Date/Time    SPECIAL RT [facility] 12ML 09/14/2019 01:15 PM     Lab Results   Component Value Date/Time    CULTURE NO GROWTH 17 HOURS 09/14/2019 01:15 PM       Radiology:  Xr Chest Portable    Result Date: 9/14/2019  No focal consolidation. Physical Examination:        General appearance:  alert, cooperative and no distress  Mental Status:  oriented to person, place and time and normal affect  Lungs:  clear to auscultation bilaterally, normal effort  Heart:  regular rate and rhythm, no murmur  Abdomen:  soft, nontender, nondistended, normal bowel sounds, no masses, hepatomegaly, splenomegaly  Extremities:  no edema, redness, tenderness in the calves  Skin:  no gross lesions, rashes, induration    Assessment:        Hospital Problems           Last Modified POA    * (Principal) Acute bronchitis due to Rhinovirus 9/15/2019 Yes    Nonrheumatic aortic valve stenosis 9/14/2019 Yes    Pulmonary hypertension ([address] 9/14/2019 Yes    Atrial fibrillation ([facility] [address] 9/14/2019 Yes    Essential hypertension 9/14/2019 Yes    S/P TAVR (transcatheter aortic valve replacement) 9/14/2019 Yes                Plan:        1. Dc antibiotics: this is viral  2. Dc ivf  3.  Dc home if does ok with therapy today; echo can be as outpatient if discharged    Sherrie Carrasco DO  9/15/2019  9:56 AM

## 2019-09-16 LAB
-: ABNORMAL
AMORPHOUS: ABNORMAL
ANION GAP SERPL CALCULATED.3IONS-SCNC: 10 MMOL/L (ref 9–17)
BACTERIA: ABNORMAL
BILIRUBIN URINE: NEGATIVE
BUN BLDV-MCNC: 18 MG/DL (ref 8–23)
BUN/CREAT BLD: 21 (ref 9–20)
CALCIUM SERPL-MCNC: 8.8 MG/DL (ref 8.6–10.4)
CASTS UA: ABNORMAL /LPF
CASTS UA: ABNORMAL /LPF
CHLORIDE BLD-SCNC: 100 MMOL/L (ref 98–107)
CO2: 23 MMOL/L (ref 20–31)
COLOR: YELLOW
COMMENT UA: ABNORMAL
CREAT SERPL-MCNC: 0.84 MG/DL (ref 0.5–0.9)
CRYSTALS, UA: ABNORMAL /HPF
EKG ATRIAL RATE: 73 BPM
EKG P AXIS: 24 DEGREES
EKG P-R INTERVAL: 166 MS
EKG Q-T INTERVAL: 428 MS
EKG QRS DURATION: 142 MS
EKG QTC CALCULATION (BAZETT): 471 MS
EKG R AXIS: -61 DEGREES
EKG T AXIS: 67 DEGREES
EKG VENTRICULAR RATE: 73 BPM
EPITHELIAL CELLS UA: ABNORMAL /HPF (ref 0–5)
GFR AFRICAN AMERICAN: >60 ML/MIN
GFR NON-AFRICAN AMERICAN: >60 ML/MIN
GFR SERPL CREATININE-BSD FRML MDRD: ABNORMAL ML/MIN/{1.73_M2}
GFR SERPL CREATININE-BSD FRML MDRD: ABNORMAL ML/MIN/{1.73_M2}
GLUCOSE BLD-MCNC: 104 MG/DL (ref 70–99)
GLUCOSE URINE: NEGATIVE
HCT VFR BLD CALC: 35.5 % (ref 36.3–47.1)
HEMOGLOBIN: 11 G/DL (ref 11.9–15.1)
KETONES, URINE: NEGATIVE
LEUKOCYTE ESTERASE, URINE: NEGATIVE
LV EF: 60 %
LVEF MODALITY: NORMAL
MCH RBC QN AUTO: 27.7 PG (ref 25.2–33.5)
MCHC RBC AUTO-ENTMCNC: 31 G/DL (ref 28.4–34.8)
MCV RBC AUTO: 89.4 FL (ref 82.6–102.9)
MUCUS: ABNORMAL
NITRITE, URINE: NEGATIVE
NRBC AUTOMATED: 0 PER 100 WBC
OTHER OBSERVATIONS UA: ABNORMAL
PDW BLD-RTO: 12.6 % (ref 11.8–14.4)
PH UA: 6 (ref 5–8)
PLATELET # BLD: 154 K/UL (ref 138–453)
PMV BLD AUTO: 9.7 FL (ref 8.1–13.5)
POTASSIUM SERPL-SCNC: 4.1 MMOL/L (ref 3.7–5.3)
PROTEIN UA: NEGATIVE
RBC # BLD: 3.97 M/UL (ref 3.95–5.11)
RBC UA: ABNORMAL /HPF (ref 0–2)
RENAL EPITHELIAL, UA: ABNORMAL /HPF
SODIUM BLD-SCNC: 133 MMOL/L (ref 135–144)
SPECIFIC GRAVITY UA: 1.02 (ref 1–1.03)
TRICHOMONAS: ABNORMAL
TURBIDITY: ABNORMAL
URINE HGB: ABNORMAL
UROBILINOGEN, URINE: NORMAL
WBC # BLD: 3.7 K/UL (ref 3.5–11.3)
WBC UA: ABNORMAL /HPF (ref 0–5)
YEAST: ABNORMAL

## 2019-09-16 PROCEDURE — 96372 THER/PROPH/DIAG INJ SC/IM: CPT

## 2019-09-16 PROCEDURE — 97530 THERAPEUTIC ACTIVITIES: CPT

## 2019-09-16 PROCEDURE — 97167 OT EVAL HIGH COMPLEX 60 MIN: CPT

## 2019-09-16 PROCEDURE — 6370000000 HC RX 637 (ALT 250 FOR IP): Performed by: INTERNAL MEDICINE

## 2019-09-16 PROCEDURE — 80048 BASIC METABOLIC PNL TOTAL CA: CPT

## 2019-09-16 PROCEDURE — 87186 SC STD MICRODIL/AGAR DIL: CPT

## 2019-09-16 PROCEDURE — 36415 COLL VENOUS BLD VENIPUNCTURE: CPT

## 2019-09-16 PROCEDURE — 2580000003 HC RX 258: Performed by: CLINICAL NURSE SPECIALIST

## 2019-09-16 PROCEDURE — 1200000000 HC SEMI PRIVATE

## 2019-09-16 PROCEDURE — 99232 SBSQ HOSP IP/OBS MODERATE 35: CPT | Performed by: INTERNAL MEDICINE

## 2019-09-16 PROCEDURE — 81001 URINALYSIS AUTO W/SCOPE: CPT

## 2019-09-16 PROCEDURE — 6360000002 HC RX W HCPCS: Performed by: CLINICAL NURSE SPECIALIST

## 2019-09-16 PROCEDURE — 93010 ELECTROCARDIOGRAM REPORT: CPT | Performed by: INTERNAL MEDICINE

## 2019-09-16 PROCEDURE — 87077 CULTURE AEROBIC IDENTIFY: CPT

## 2019-09-16 PROCEDURE — 93306 TTE W/DOPPLER COMPLETE: CPT

## 2019-09-16 PROCEDURE — 6370000000 HC RX 637 (ALT 250 FOR IP): Performed by: CLINICAL NURSE SPECIALIST

## 2019-09-16 PROCEDURE — 97163 PT EVAL HIGH COMPLEX 45 MIN: CPT

## 2019-09-16 PROCEDURE — 85027 COMPLETE CBC AUTOMATED: CPT

## 2019-09-16 PROCEDURE — 97535 SELF CARE MNGMENT TRAINING: CPT

## 2019-09-16 PROCEDURE — 87086 URINE CULTURE/COLONY COUNT: CPT

## 2019-09-16 RX ADMIN — CITALOPRAM HYDROBROMIDE 20 MG: 20 TABLET ORAL at 08:37

## 2019-09-16 RX ADMIN — METOPROLOL SUCCINATE 25 MG: 25 TABLET, FILM COATED, EXTENDED RELEASE ORAL at 08:37

## 2019-09-16 RX ADMIN — LISINOPRIL 10 MG: 10 TABLET ORAL at 08:37

## 2019-09-16 RX ADMIN — ENOXAPARIN SODIUM 40 MG: 40 INJECTION SUBCUTANEOUS at 08:37

## 2019-09-16 RX ADMIN — SODIUM CHLORIDE, PRESERVATIVE FREE 10 ML: 5 INJECTION INTRAVENOUS at 08:38

## 2019-09-16 RX ADMIN — ATORVASTATIN CALCIUM 40 MG: 40 TABLET, FILM COATED ORAL at 19:58

## 2019-09-16 RX ADMIN — CLOPIDOGREL 75 MG: 75 TABLET, FILM COATED ORAL at 08:37

## 2019-09-16 RX ADMIN — ACETAMINOPHEN 650 MG: 325 TABLET ORAL at 08:43

## 2019-09-16 RX ADMIN — ACETAMINOPHEN 650 MG: 325 TABLET ORAL at 19:58

## 2019-09-16 RX ADMIN — SODIUM CHLORIDE, PRESERVATIVE FREE 10 ML: 5 INJECTION INTRAVENOUS at 21:43

## 2019-09-16 RX ADMIN — Medication 10 ML: at 08:36

## 2019-09-16 RX ADMIN — PANTOPRAZOLE SODIUM 40 MG: 40 TABLET, DELAYED RELEASE ORAL at 08:43

## 2019-09-16 RX ADMIN — MULTIPLE VITAMINS W/ MINERALS TAB 1 TABLET: TAB at 08:37

## 2019-09-16 ASSESSMENT — PAIN SCALES - GENERAL
PAINLEVEL_OUTOF10: 5
PAINLEVEL_OUTOF10: 5

## 2019-09-16 ASSESSMENT — PAIN DESCRIPTION - LOCATION: LOCATION: HEAD

## 2019-09-16 NOTE — PROGRESS NOTES
WFL  LUE Strength  Gross LUE Strength: Exceptions to [facility] [place]  RUE Strength  Gross RUE Strength: Ex[facility] to [facility]  RUE Strength Comment: REY UABRIL's 4-/5                   Plan   Plan  Times per week: 4-5x/week, 1-2x/day  Current Treatment Recommendations: Strengthening, Balance Training, Functional Mobility Training, Endurance Training, Equipment Evaluation, Education, & procurement, Patient/Caregiver Education & Training, Self-Care / ADL, Safety Education & Training         Goals  Short term goals  Time Frame for Short term goals: by discharge, pt will  Short term goal 1: demo min A for ADL transfers with good safety and AD as approp  Short term goal 2: demo min A with functional mob in room for ADL completion with good safety and pacing  Short term goal 3: demo SBA with UB ADLs with min A LB ADLs with DME as needed and good safety/pacing  Short term goal 4: demo min A toileting routine with good safety  Short term goal 5: demo and verb good understanding of fall prevention techs, EC/WS techs, and possible equip needs for home  Patient Goals   Patient goals : to go home when able, but appears open to SNF for short term       Therapy Time   Individual Concurrent Group Co-treatment   Time In 0851(+10 min chart review)         Time Out 8297         Minutes 51              Patient would benefit from SNF for continued occupational therapy to increase independence with  ADL of bathing, dressing, toileting and grooming. Writer recommending SNF placement for for activity tolerance and strength which will increase independence with ADL's coordinated with bed mobility and chair transfers. Continued skilled OT services to address decreased safety awareness with ADL and IADL tasks and for education and increased independence with DME and AE for fall prevention and ec/ws techniques prior to d/c home.     Co-treatment with PT warranted secondary to decreased safety and independence requiring 2 skilled therapy professionals to address

## 2019-09-17 LAB
ANION GAP SERPL CALCULATED.3IONS-SCNC: 9 MMOL/L (ref 9–17)
BUN BLDV-MCNC: 18 MG/DL (ref 8–23)
BUN/CREAT BLD: 21 (ref 9–20)
CALCIUM SERPL-MCNC: 9.3 MG/DL (ref 8.6–10.4)
CHLORIDE BLD-SCNC: 99 MMOL/L (ref 98–107)
CO2: 26 MMOL/L (ref 20–31)
CREAT SERPL-MCNC: 0.84 MG/DL (ref 0.5–0.9)
GFR AFRICAN AMERICAN: >60 ML/MIN
GFR NON-AFRICAN AMERICAN: >60 ML/MIN
GFR SERPL CREATININE-BSD FRML MDRD: ABNORMAL ML/MIN/{1.73_M2}
GFR SERPL CREATININE-BSD FRML MDRD: ABNORMAL ML/MIN/{1.73_M2}
GLUCOSE BLD-MCNC: 109 MG/DL (ref 70–99)
HCT VFR BLD CALC: 35.1 % (ref 36.3–47.1)
HEMOGLOBIN: 11 G/DL (ref 11.9–15.1)
MCH RBC QN AUTO: 28.1 PG (ref 25.2–33.5)
MCHC RBC AUTO-ENTMCNC: 31.3 G/DL (ref 28.4–34.8)
MCV RBC AUTO: 89.8 FL (ref 82.6–102.9)
NRBC AUTOMATED: 0 PER 100 WBC
PDW BLD-RTO: 12.8 % (ref 11.8–14.4)
PLATELET # BLD: 166 K/UL (ref 138–453)
PMV BLD AUTO: 9.5 FL (ref 8.1–13.5)
POTASSIUM SERPL-SCNC: 4.3 MMOL/L (ref 3.7–5.3)
RBC # BLD: 3.91 M/UL (ref 3.95–5.11)
SODIUM BLD-SCNC: 134 MMOL/L (ref 135–144)
WBC # BLD: 3.7 K/UL (ref 3.5–11.3)

## 2019-09-17 PROCEDURE — 36415 COLL VENOUS BLD VENIPUNCTURE: CPT

## 2019-09-17 PROCEDURE — 99232 SBSQ HOSP IP/OBS MODERATE 35: CPT | Performed by: INTERNAL MEDICINE

## 2019-09-17 PROCEDURE — 97535 SELF CARE MNGMENT TRAINING: CPT

## 2019-09-17 PROCEDURE — 80048 BASIC METABOLIC PNL TOTAL CA: CPT

## 2019-09-17 PROCEDURE — 1200000000 HC SEMI PRIVATE

## 2019-09-17 PROCEDURE — 85027 COMPLETE CBC AUTOMATED: CPT

## 2019-09-17 PROCEDURE — 6370000000 HC RX 637 (ALT 250 FOR IP): Performed by: INTERNAL MEDICINE

## 2019-09-17 PROCEDURE — 6370000000 HC RX 637 (ALT 250 FOR IP): Performed by: CLINICAL NURSE SPECIALIST

## 2019-09-17 PROCEDURE — 96372 THER/PROPH/DIAG INJ SC/IM: CPT

## 2019-09-17 PROCEDURE — 97116 GAIT TRAINING THERAPY: CPT

## 2019-09-17 PROCEDURE — 2580000003 HC RX 258: Performed by: CLINICAL NURSE SPECIALIST

## 2019-09-17 PROCEDURE — 6360000002 HC RX W HCPCS: Performed by: CLINICAL NURSE SPECIALIST

## 2019-09-17 PROCEDURE — 97530 THERAPEUTIC ACTIVITIES: CPT

## 2019-09-17 RX ADMIN — MULTIPLE VITAMINS W/ MINERALS TAB 1 TABLET: TAB at 09:32

## 2019-09-17 RX ADMIN — ATORVASTATIN CALCIUM 40 MG: 40 TABLET, FILM COATED ORAL at 21:30

## 2019-09-17 RX ADMIN — CLOPIDOGREL 75 MG: 75 TABLET, FILM COATED ORAL at 09:32

## 2019-09-17 RX ADMIN — ACETAMINOPHEN 650 MG: 325 TABLET ORAL at 13:36

## 2019-09-17 RX ADMIN — CITALOPRAM HYDROBROMIDE 20 MG: 20 TABLET ORAL at 09:32

## 2019-09-17 RX ADMIN — ENOXAPARIN SODIUM 40 MG: 40 INJECTION SUBCUTANEOUS at 09:32

## 2019-09-17 RX ADMIN — PANTOPRAZOLE SODIUM 40 MG: 40 TABLET, DELAYED RELEASE ORAL at 05:57

## 2019-09-17 RX ADMIN — METOPROLOL SUCCINATE 25 MG: 25 TABLET, FILM COATED, EXTENDED RELEASE ORAL at 09:32

## 2019-09-17 RX ADMIN — LISINOPRIL 10 MG: 10 TABLET ORAL at 09:32

## 2019-09-17 RX ADMIN — SODIUM CHLORIDE, PRESERVATIVE FREE 10 ML: 5 INJECTION INTRAVENOUS at 21:31

## 2019-09-17 RX ADMIN — ACETAMINOPHEN 650 MG: 325 TABLET ORAL at 21:30

## 2019-09-17 RX ADMIN — SODIUM CHLORIDE, PRESERVATIVE FREE 10 ML: 5 INJECTION INTRAVENOUS at 09:33

## 2019-09-17 ASSESSMENT — PAIN DESCRIPTION - LOCATION: LOCATION: HEAD

## 2019-09-17 ASSESSMENT — PAIN SCALES - GENERAL
PAINLEVEL_OUTOF10: 10
PAINLEVEL_OUTOF10: 4
PAINLEVEL_OUTOF10: 5

## 2019-09-17 NOTE — PROGRESS NOTES
77   CHOLHDLRATIO  --  3.3   TRIG  --  62   VLDL  --  NOT REPORTED     ABG:No results found for: POCPH, PHART, PH, POCPCO2, TWL9ZSY, PCO2, POCPO2, PO2ART, PO2, POCHCO3, ATY7AVY, HCO3, NBEA, PBEA, BEART, BE, THGBART, THB, SNL0EBK, BYKK5MHH, U0TFSHAD, O2SAT, FIO2  Lab Results   Component Value Date/Time    [facility] 09/14/2019 01:15 PM     Lab Results   Component Value Date/Time    CULTURE NO GROWTH 3 DAYS 09/14/2019 01:15 PM       Radiology:  Reyna Leon Chest Portable    Result Date: 9/15/2019  No acute cardiopulmonary abnormality. Xr Chest Portable    Result Date: 9/14/2019  No focal consolidation. Physical Examination:        General appearance:  alert, cooperative and no distress  Mental Status:  oriented to person, place and time and normal affect  Lungs:  clear to auscultation bilaterally, normal effort  Heart:  regular rate and rhythm, no murmur  Abdomen:  soft, nontender, nondistended, normal bowel sounds, no masses, hepatomegaly, splenomegaly  Extremities:  no edema, redness, tenderness in the calves  Skin:  no gross lesions, rashes, induration    Assessment:        Hospital Problems           Last Modified POA    * (Principal) Acute bronchitis due to Rhinovirus 9/15/2019 Yes    Nonrheumatic aortic valve stenosis 9/14/2019 Yes    Pulmonary hypertension ([address] 9/14/2019 Yes    Atrial fibrillation ([facility] [address] 9/14/2019 Yes    Essential hypertension 9/14/2019 Yes    S/P TAVR (transcatheter aortic valve replacement) 9/14/2019 Yes                Plan:      - continue off abx.   - oxygen therapr as needed. - upon review of med list patient states has been off digoxin ' for a while'. Hence did not restart.   - SNF placement recommended by PT. A/w insurance precert. - rate controlled. Not on AC sec to gi bleed. Continue DAPT.        Yair Escobar MD  9/17/2019

## 2019-09-17 NOTE — CARE COORDINATION
SW received call from [facility] with [facility] regarding the pt has been accepted and the insurance pre-cert will be started

## 2019-09-17 NOTE — DISCHARGE INSTR - COC
Continuity of Care Form    Patient Name: Sukh Jarrett   :  1931  MRN:  [id number]    Admit date:  2019  Discharge date:  19    Code Status Order: Full Code   Advance Directives:   [facility] Documentation     Date/Time Healthcare Directive Type of Healthcare Directive Copy in [facility] [address] Agent's Name Healthcare Agent's Phone Number    [phone]  Yes, patient has an advance directive for healthcare treatment  --  --  --  --  --          Admitting Physician:  Kaleigh Hill MD  PCP: TASHA Alonso - CNP    Discharging Nurse: [facility] Unit/Room#: 1011/1011-02  Discharging Unit Phone Number: [phone]    Emergency Contact:   Extended Emergency Contact Information  Primary Emergency Contact: Néstor Chavarria Phone: [phone]  Relation: Child  Secondary Emergency Contact: Candido León  Address: [address] [place] [address]  Home Phone: [phone]  Relation: Spouse    Past Surgical History:  Past Surgical History:   Procedure Laterality Date    AORTIC VALVE REPAIR      tavr    BACK SURGERY      broke her back    CAROTID ENDARTERECTOMY Right     COLON SURGERY      COLONOSCOPY      DENTAL SURGERY      top dentures    DENTAL SURGERY  2018    6 teeth removed    HERNIA REPAIR  2012    HERNIA REPAIR  2013    JOINT REPLACEMENT      [place] EGD TRANSORAL BIOPSY SINGLE/MULTIPLE N/A 2017    EGD BIOPSY performed by Eloy Valle MD at [address]  2017    1.5 cm antral ulcer,clean base    UPPER GASTROINTESTINAL ENDOSCOPY N/A 2018    EGD BIOPSY performed by Eoly Valle MD at [address] [place] [address]  2018    Ulcer appears to be almost healed. There is some inflammation at the site of ulcer.        Immunization History:

## 2019-09-17 NOTE — PROGRESS NOTES
Cognition      Objective   Bed mobility  Scooting: Moderate assistance  Transfers  Sit to Stand: Minimal Assistance  Stand to sit: Minimal Assistance;2 Person Assistance  Ambulation  Ambulation?: Yes  Ambulation 1  Surface: level tile  Device: Rolling Walker  Assistance: Minimal assistance;2 Person assistance  Quality of Gait: pt limited in distance due to fatigue  Distance: 5 ft  Comments: up to chair     Balance  Posture: Good  Sitting - Static: Good  Sitting - Dynamic: Good;-  Standing - Static: Fair;-  Exercises  Comments: seated LE AROM x 15 reps                        G-Code     OutComes Score                                                     AM-PAC Score  AM-PAC Inpatient Mobility Raw Score : 10 (09/17/19 1110)  AM-PAC Inpatient T-Scale Score : 32.29 (09/17/19 1110)  Mobility Inpatient CMS 0-100% Score: 76.75 (09/17/19 1110)  Mobility Inpatient CMS G-Code Modifier : CL (09/17/19 1110)          Goals  Short term goals  Time Frame for Short term goals: 12 visits:  Short term goal 1: Pt. to be indep with bed mob. Short term goal 2: Pt. to be CGA for sit to stand transfers  Short term goal 3: Pt. to be CGA for amb. 100ft. with RW  Short term goal 4: Pt. to tolerate 25+ min. of PT daily for ther ex/ gait/ balance/ functional mobility training  Patient Goals   Patient goals : get stronger    Plan    Plan  Times per week: 1-2x/day; 5-6days/wk  Specific instructions for Next Treatment: progress gait/ balance; closely assess tolerance to upright positioning (monitor BP)  Current Treatment Recommendations: Strengthening, ROM, Balance Training, Functional Mobility Training, Transfer Training, Safety Education & Training, Endurance Training, Gait Training, Patient/Caregiver Education & Training, Home Exercise Program  Safety Devices  Type of devices:  All fall risk precautions in place, Call light within reach, Gait belt, Patient at risk for falls, Left in bed, Nurse notified, Bed alarm in place     Therapy Time

## 2019-09-17 NOTE — PROGRESS NOTES
Occupational Therapy  Facility/Department: [facility] PROGRESSIVE CARE  Daily Treatment Note  NAME: Dalia Sy  : 1931  MRN: [id number]    Date of Service: 2019    Discharge Recommendations:  [address]   Patient would benefit from SNF for continued occupational therapy to increase independence with  ADL of bathing, dressing, toileting and grooming. Writer recommending SNF placement for for activity tolerance and strength which will increase independence with ADL's coordinated with bed mobility and chair transfers. Continued skilled OT services to address decreased safety awareness with ADL and IADL tasks and for education and increased independence with DME and AE for fall prevention and ec/ws techniques prior to d/c home. Assessment   Performance deficits / Impairments: Decreased functional mobility ; Decreased ADL status; Decreased strength;Decreased safe awareness;Decreased balance;Decreased endurance;Decreased cognition  Prognosis: Good  OT Education: Energy Conservation;Home Exercise Program;Transfer Training  Patient Education: Pt issued HEP (simple UE ROM exercises) and packet on EC/WS, fall prevention, breathing techniques, home/community safety, stress mgmt. Writemoira reddy'wilton each ex for pt and pt returned good demo, writer instructed pt to complete 10 reps of each ex a few times a day to increase strength, pt agreeable and expressed gratitude. REQUIRES OT FOLLOW UP: Yes  Activity Tolerance  Activity Tolerance: Patient Tolerated treatment well;Patient limited by fatigue  Activity Tolerance: P+  Safety Devices  Safety Devices in place: Yes  Type of devices: Patient at risk for falls;Call light within reach; Left in chair;Nurse notified; Chair alarm in place;Gait belt         Patient Diagnosis(es): The encounter diagnosis was Acute on chronic congestive heart failure, unspecified heart failure type ([facility] [address]       has a past medical history of Antral ulcer, acute, assistance  Sit to stand: Minimal assistance;2 Person assistance  Stand to sit: Minimal assistance;2 Person assistance  Transfer Comments: Max verbal cues for hand placement, overall technique/safety and sequencing movements                       Cognition  Overall Cognitive Status: Exceptions  Arousal/Alertness: Appropriate responses to stimuli  Following Commands: Follows one step commands with repetition; Follows one step commands with increased time  Attention Span: Appears intact  Memory: Appears intact  Safety Judgement: Decreased awareness of need for assistance;Decreased awareness of need for safety  Problem Solving: Assistance required to generate solutions;Assistance required to implement solutions;Decreased awareness of errors;Assistance required to correct errors made  Insights: Decreased awareness of deficits  Initiation: Requires cues for all  Sequencing: Requires cues for all     Perception  Overall Perceptual Status: Impaired  Initiation: Cues to initiate tasks                                   Plan   Plan  Times per week: 4-5x/week, 1-2x/day  Current Treatment Recommendations: Strengthening, Balance Training, Functional Mobility Training, Endurance Training, Equipment Evaluation, Education, & procurement, Patient/Caregiver Education & Training, Self-Care / ADL, Safety Education & Training                                   AM-PAC Score        AM-[facility] Inpatient Daily Activity Raw Score: 16 (09/17/19 1040)  AM-PAC Inpatient ADL T-Scale Score : 35.96 (09/17/19 1040)  ADL Inpatient CMS 0-100% Score: 53.32 (09/17/19 1040)  ADL Inpatient CMS G-Code Modifier : CK (09/17/19 1040)    Goals  Short term goals  Time Frame for Short term goals: by discharge, pt will  Short term goal 1: demo min A for ADL transfers with good safety and AD as approp  Short term goal 2: demo min A with functional mob in room for ADL completion with good safety and pacing  Short term goal 3: demo SBA with UB ADLs with min A LB ADLs

## 2019-09-18 LAB
ANION GAP SERPL CALCULATED.3IONS-SCNC: 7 MMOL/L (ref 9–17)
BUN BLDV-MCNC: 19 MG/DL (ref 8–23)
BUN/CREAT BLD: 23 (ref 9–20)
CALCIUM SERPL-MCNC: 9.1 MG/DL (ref 8.6–10.4)
CHLORIDE BLD-SCNC: 100 MMOL/L (ref 98–107)
CO2: 26 MMOL/L (ref 20–31)
CREAT SERPL-MCNC: 0.82 MG/DL (ref 0.5–0.9)
CULTURE: ABNORMAL
CULTURE: ABNORMAL
GFR AFRICAN AMERICAN: >60 ML/MIN
GFR NON-AFRICAN AMERICAN: >60 ML/MIN
GFR SERPL CREATININE-BSD FRML MDRD: ABNORMAL ML/MIN/{1.73_M2}
GFR SERPL CREATININE-BSD FRML MDRD: ABNORMAL ML/MIN/{1.73_M2}
GLUCOSE BLD-MCNC: 105 MG/DL (ref 70–99)
HCT VFR BLD CALC: 36.5 % (ref 36.3–47.1)
HEMOGLOBIN: 11.3 G/DL (ref 11.9–15.1)
Lab: ABNORMAL
MCH RBC QN AUTO: 27.6 PG (ref 25.2–33.5)
MCHC RBC AUTO-ENTMCNC: 31 G/DL (ref 28.4–34.8)
MCV RBC AUTO: 89.2 FL (ref 82.6–102.9)
NRBC AUTOMATED: 0 PER 100 WBC
PDW BLD-RTO: 12.7 % (ref 11.8–14.4)
PLATELET # BLD: 172 K/UL (ref 138–453)
PMV BLD AUTO: 9.6 FL (ref 8.1–13.5)
POTASSIUM SERPL-SCNC: 4.5 MMOL/L (ref 3.7–5.3)
RBC # BLD: 4.09 M/UL (ref 3.95–5.11)
SODIUM BLD-SCNC: 133 MMOL/L (ref 135–144)
SPECIMEN DESCRIPTION: ABNORMAL
WBC # BLD: 3.9 K/UL (ref 3.5–11.3)

## 2019-09-18 PROCEDURE — 85027 COMPLETE CBC AUTOMATED: CPT

## 2019-09-18 PROCEDURE — 99232 SBSQ HOSP IP/OBS MODERATE 35: CPT | Performed by: INTERNAL MEDICINE

## 2019-09-18 PROCEDURE — 96372 THER/PROPH/DIAG INJ SC/IM: CPT

## 2019-09-18 PROCEDURE — 2580000003 HC RX 258: Performed by: CLINICAL NURSE SPECIALIST

## 2019-09-18 PROCEDURE — 36415 COLL VENOUS BLD VENIPUNCTURE: CPT

## 2019-09-18 PROCEDURE — 6360000002 HC RX W HCPCS: Performed by: CLINICAL NURSE SPECIALIST

## 2019-09-18 PROCEDURE — 6370000000 HC RX 637 (ALT 250 FOR IP): Performed by: INTERNAL MEDICINE

## 2019-09-18 PROCEDURE — 1200000000 HC SEMI PRIVATE

## 2019-09-18 PROCEDURE — 97110 THERAPEUTIC EXERCISES: CPT

## 2019-09-18 PROCEDURE — 80048 BASIC METABOLIC PNL TOTAL CA: CPT

## 2019-09-18 PROCEDURE — 6370000000 HC RX 637 (ALT 250 FOR IP): Performed by: CLINICAL NURSE SPECIALIST

## 2019-09-18 PROCEDURE — 97535 SELF CARE MNGMENT TRAINING: CPT

## 2019-09-18 RX ADMIN — CITALOPRAM HYDROBROMIDE 20 MG: 20 TABLET ORAL at 08:07

## 2019-09-18 RX ADMIN — ENOXAPARIN SODIUM 40 MG: 40 INJECTION SUBCUTANEOUS at 08:07

## 2019-09-18 RX ADMIN — MULTIPLE VITAMINS W/ MINERALS TAB 1 TABLET: TAB at 08:07

## 2019-09-18 RX ADMIN — PANTOPRAZOLE SODIUM 40 MG: 40 TABLET, DELAYED RELEASE ORAL at 08:07

## 2019-09-18 RX ADMIN — ATORVASTATIN CALCIUM 40 MG: 40 TABLET, FILM COATED ORAL at 19:39

## 2019-09-18 RX ADMIN — SODIUM CHLORIDE, PRESERVATIVE FREE 10 ML: 5 INJECTION INTRAVENOUS at 08:07

## 2019-09-18 RX ADMIN — LISINOPRIL 10 MG: 10 TABLET ORAL at 08:07

## 2019-09-18 RX ADMIN — SODIUM CHLORIDE, PRESERVATIVE FREE 10 ML: 5 INJECTION INTRAVENOUS at 21:00

## 2019-09-18 RX ADMIN — METOPROLOL SUCCINATE 25 MG: 25 TABLET, FILM COATED, EXTENDED RELEASE ORAL at 08:07

## 2019-09-18 RX ADMIN — ONDANSETRON 4 MG: 4 TABLET, ORALLY DISINTEGRATING ORAL at 10:19

## 2019-09-18 RX ADMIN — CLOPIDOGREL 75 MG: 75 TABLET, FILM COATED ORAL at 08:07

## 2019-09-18 ASSESSMENT — PAIN SCALES - GENERAL
PAINLEVEL_OUTOF10: 0

## 2019-09-18 ASSESSMENT — PAIN DESCRIPTION - LOCATION: LOCATION: GENERALIZED

## 2019-09-18 NOTE — PROGRESS NOTES
of errors;Assistance required to correct errors made  Insights: Decreased awareness of deficits  Initiation: Requires cues for some  Sequencing: Requires cues for some     Perception  Overall Perceptual Status: Impaired  Initiation: Cues to initiate tasks                                   Plan   Plan  Times per week: 4-5x/week, 1-2x/day  Current Treatment Recommendations: Strengthening, Balance Training, Functional Mobility Training, Endurance Training, Equipment Evaluation, Education, & procurement, Patient/Caregiver Education & Training, Self-Care / ADL, Safety Education & Training    AM-PAC Score        AM-PAC Inpatient Daily Activity Raw Score: 17 (09/18/19 1239)  AM-PAC Inpatient ADL T-Scale Score : 37.26 (09/18/19 1239)  ADL Inpatient CMS 0-100% Score: 50.11 (09/18/19 1239)  ADL Inpatient CMS G-Code Modifier : CK (09/18/19 1239)    Goals  Short term goals  Time Frame for Short term goals: by discharge, pt will  Short term goal 1: demo min A for ADL transfers with good safety and AD as approp  Short term goal 2: demo min A with functional mob in room for ADL completion with good safety and pacing  Short term goal 3: demo SBA with UB ADLs with min A LB ADLs with DME as needed and good safety/pacing  Short term goal 4: demo min A toileting routine with good safety  Short term goal 5: demo and verb good understanding of fall prevention techs, EC/WS techs, and possible equip needs for home  Patient Goals   Patient goals : to go home when able, but appears open to SNF for short term       Therapy Time   Individual Concurrent Group Co-treatment   Time In 0959         Time Out 1025         Minutes BRISA Dinh

## 2019-09-18 NOTE — PROGRESS NOTES
[facility]    Progress Note    9/18/2019      Name:   Hattie Hodges  MRN:     [id number]     Acct:      [de-identified]   Room:   [address] [place] [address] Day:  4  Admit Date:  9/14/2019 10:39 AM    PCP:   TASHA Child CNP  Code Status:  Full Code    Subjective:     C/C:   Chief Complaint   Patient presents with    Fatigue     Interval History Status: improved. Patient sitting in bedside chair, in no distress. Afebrile. hemodynamically stable. Brief History:   Per my partner:'This 80 yof who is a poor historian, presents with generalized weakness and fatigue.  She has had sob and bay and also had chills/sweats and shakes at home and low grade fever here. Felipa Sorensen has had a cough productive of green phlegm.  She also notes about a 12# weight gain in last several months.     Viral resp panel revealed rhinovirus  PMH : HTN, HPL, depression,s/p TAVR, Afib/heart block s/p st pita pacemaker not on AC sec to GI bleed from gastric ulcer. Review of Systems:     Constitutional:  negative for chills, fevers, sweats  Respiratory:  negative for cough, dyspnea on exertion, shortness of breath, wheezing  Cardiovascular:  negative for chest pain, chest pressure/discomfort, lower extremity edema, palpitations  Gastrointestinal:  negative for abdominal pain, constipation, diarrhea, nausea, vomiting  Neurological:  negative for dizziness, headache    Medications:      Allergies:  No Known Allergies    Current Meds:   Scheduled Meds:    lisinopril  10 mg Oral Daily    metoprolol succinate  25 mg Oral Daily    therapeutic multivitamin-minerals  1 tablet Oral Daily    pantoprazole  40 mg Oral QAM AC    sodium chloride flush  10 mL Intravenous 2 times per day    enoxaparin  40 mg Subcutaneous Daily    clopidogrel  75 mg Oral Daily    citalopram  20 mg Oral Daily    atorvastatin  40 mg Oral Nightly     Continuous Infusions:   PRN Meds: HYDROcodone 5 mg -

## 2019-09-18 NOTE — PROGRESS NOTES
Signs  Patient Currently in Pain: Yes       Orientation     Cognition      Objective         Ambulation  Ambulation?: No        Exercises  Comments: seated LE AROM x 15 reps, UE light resistance tband ex x 15 reps         Comment: pt refuses to amb. at this               G-Code     OutComes Score                                                     AM-PAC Score  AM-PAC Inpatient Mobility Raw Score : 10 (09/18/19 1325)  AM-PAC Inpatient T-Scale Score : 32.29 (09/18/19 1325)  Mobility Inpatient CMS 0-100% Score: 76.75 (09/18/19 1325)  Mobility Inpatient CMS G-Code Modifier : CL (09/18/19 1325)          Goals  Short term goals  Time Frame for Short term goals: 12 visits:  Short term goal 1: Pt. to be indep with bed mob. Short term goal 2: Pt. to be CGA for sit to stand transfers  Short term goal 3: Pt. to be CGA for amb. 100ft. with RW  Short term goal 4: Pt. to tolerate 25+ min. of PT daily for ther ex/ gait/ balance/ functional mobility training  Patient Goals   Patient goals : get stronger    Plan    Plan  Times per week: 1-2x/day; 5-6days/wk  Specific instructions for Next Treatment: progress gait/ balance; closely assess tolerance to upright positioning (monitor BP)  Current Treatment Recommendations: Strengthening, ROM, Balance Training, Functional Mobility Training, Transfer Training, Safety Education & Training, Endurance Training, Gait Training, Patient/Caregiver Education & Training, Home Exercise Program  Safety Devices  Type of devices:  All fall risk precautions in place, Call light within reach, Gait belt, Patient at risk for falls, Left in bed, Nurse notified, Bed alarm in place     Therapy Time   Individual Concurrent Group Co-treatment   Time In  1145         Time Out  1210         Minutes  25                 [address], [place]

## 2019-09-19 VITALS
HEART RATE: 102 BPM | HEIGHT: 63 IN | OXYGEN SATURATION: 96 % | SYSTOLIC BLOOD PRESSURE: 129 MMHG | TEMPERATURE: 98.1 F | BODY MASS INDEX: 30.88 KG/M2 | RESPIRATION RATE: 18 BRPM | DIASTOLIC BLOOD PRESSURE: 60 MMHG | WEIGHT: 174.3 LBS

## 2019-09-19 LAB
ANION GAP SERPL CALCULATED.3IONS-SCNC: 11 MMOL/L (ref 9–17)
BUN BLDV-MCNC: 19 MG/DL (ref 8–23)
BUN/CREAT BLD: 22 (ref 9–20)
CALCIUM SERPL-MCNC: 9.2 MG/DL (ref 8.6–10.4)
CHLORIDE BLD-SCNC: 98 MMOL/L (ref 98–107)
CO2: 25 MMOL/L (ref 20–31)
CREAT SERPL-MCNC: 0.86 MG/DL (ref 0.5–0.9)
GFR AFRICAN AMERICAN: >60 ML/MIN
GFR NON-AFRICAN AMERICAN: >60 ML/MIN
GFR SERPL CREATININE-BSD FRML MDRD: ABNORMAL ML/MIN/{1.73_M2}
GFR SERPL CREATININE-BSD FRML MDRD: ABNORMAL ML/MIN/{1.73_M2}
GLUCOSE BLD-MCNC: 111 MG/DL (ref 70–99)
HCT VFR BLD CALC: 38.9 % (ref 36.3–47.1)
HEMOGLOBIN: 12.1 G/DL (ref 11.9–15.1)
MCH RBC QN AUTO: 27.7 PG (ref 25.2–33.5)
MCHC RBC AUTO-ENTMCNC: 31.1 G/DL (ref 28.4–34.8)
MCV RBC AUTO: 89 FL (ref 82.6–102.9)
NRBC AUTOMATED: 0 PER 100 WBC
PDW BLD-RTO: 12.6 % (ref 11.8–14.4)
PLATELET # BLD: 212 K/UL (ref 138–453)
PMV BLD AUTO: 9.9 FL (ref 8.1–13.5)
POTASSIUM SERPL-SCNC: 4.3 MMOL/L (ref 3.7–5.3)
RBC # BLD: 4.37 M/UL (ref 3.95–5.11)
SODIUM BLD-SCNC: 134 MMOL/L (ref 135–144)
WBC # BLD: 4.9 K/UL (ref 3.5–11.3)

## 2019-09-19 PROCEDURE — 99238 HOSP IP/OBS DSCHRG MGMT 30/<: CPT | Performed by: INTERNAL MEDICINE

## 2019-09-19 PROCEDURE — 85027 COMPLETE CBC AUTOMATED: CPT

## 2019-09-19 PROCEDURE — 6370000000 HC RX 637 (ALT 250 FOR IP): Performed by: CLINICAL NURSE SPECIALIST

## 2019-09-19 PROCEDURE — 36415 COLL VENOUS BLD VENIPUNCTURE: CPT

## 2019-09-19 PROCEDURE — 2580000003 HC RX 258: Performed by: CLINICAL NURSE SPECIALIST

## 2019-09-19 PROCEDURE — 6370000000 HC RX 637 (ALT 250 FOR IP): Performed by: INTERNAL MEDICINE

## 2019-09-19 PROCEDURE — 80048 BASIC METABOLIC PNL TOTAL CA: CPT

## 2019-09-19 PROCEDURE — 6360000002 HC RX W HCPCS: Performed by: CLINICAL NURSE SPECIALIST

## 2019-09-19 PROCEDURE — 96372 THER/PROPH/DIAG INJ SC/IM: CPT

## 2019-09-19 RX ADMIN — ENOXAPARIN SODIUM 40 MG: 40 INJECTION SUBCUTANEOUS at 10:23

## 2019-09-19 RX ADMIN — SODIUM CHLORIDE, PRESERVATIVE FREE 10 ML: 5 INJECTION INTRAVENOUS at 08:54

## 2019-09-19 RX ADMIN — LISINOPRIL 10 MG: 10 TABLET ORAL at 10:21

## 2019-09-19 RX ADMIN — MAGNESIUM HYDROXIDE 30 ML: 400 SUSPENSION ORAL at 10:21

## 2019-09-19 RX ADMIN — MULTIPLE VITAMINS W/ MINERALS TAB 1 TABLET: TAB at 10:22

## 2019-09-19 RX ADMIN — CLOPIDOGREL 75 MG: 75 TABLET, FILM COATED ORAL at 10:21

## 2019-09-19 RX ADMIN — METOPROLOL SUCCINATE 25 MG: 25 TABLET, FILM COATED, EXTENDED RELEASE ORAL at 10:22

## 2019-09-19 RX ADMIN — CITALOPRAM HYDROBROMIDE 20 MG: 20 TABLET ORAL at 10:22

## 2019-09-19 RX ADMIN — ACETAMINOPHEN 650 MG: 325 TABLET ORAL at 10:22

## 2019-09-19 RX ADMIN — PANTOPRAZOLE SODIUM 40 MG: 40 TABLET, DELAYED RELEASE ORAL at 08:51

## 2019-09-19 ASSESSMENT — PAIN SCALES - GENERAL
PAINLEVEL_OUTOF10: 0
PAINLEVEL_OUTOF10: 0
PAINLEVEL_OUTOF10: 7
PAINLEVEL_OUTOF10: 0
PAINLEVEL_OUTOF10: 7
PAINLEVEL_OUTOF10: 0
PAINLEVEL_OUTOF10: 5
PAINLEVEL_OUTOF10: 7
PAINLEVEL_OUTOF10: 5
PAINLEVEL_OUTOF10: 0

## 2019-09-19 ASSESSMENT — PAIN DESCRIPTION - LOCATION
LOCATION: HEAD
LOCATION: HEAD

## 2019-09-19 ASSESSMENT — PAIN DESCRIPTION - PAIN TYPE
TYPE: ACUTE PAIN
TYPE: ACUTE PAIN

## 2019-09-19 ASSESSMENT — PAIN DESCRIPTION - DESCRIPTORS: DESCRIPTORS: THROBBING

## 2019-09-19 NOTE — PROGRESS NOTES
Oral Daily    atorvastatin  40 mg Oral Nightly     Continuous Infusions:   PRN Meds: HYDROcodone 5 mg - acetaminophen, sodium chloride flush, magnesium hydroxide, acetaminophen, ondansetron **OR** ondansetron    Data:     Past Medical History:   has a past medical history of Antral ulcer, acute, Arthritis, CAD (coronary artery disease), GERD (gastroesophageal reflux disease), Hyperlipidemia, and Hypertension. Social History:   reports that she has never smoked. She has never used smokeless tobacco. She reports that she does not drink alcohol or use drugs. Family History:   Family History   Problem Relation Age of Onset    Heart Disease Mother     Heart Disease Sister     Heart Disease Brother        Vitals:  /60   Pulse 102   Temp 98.1 °F (36.7 °C) (Oral)   Resp 18   Ht 5' 3\" (1.6 m)   Wt 174 lb 4.8 oz (79.1 kg)   SpO2 96%   BMI 30.88 kg/m²   Temp (24hrs), Av.3 °F (36.8 °C), Min:98.1 °F (36.7 °C), Max:98.6 °F (37 °C)    No results for input(s): POCGLU in the last 72 hours. I/O (24Hr):     Intake/Output Summary (Last 24 hours) at 2019 1314  Last data filed at 2019 0851  Gross per 24 hour   Intake 578 ml   Output 1450 ml   Net -872 ml       Labs:  Hematology:  Recent Labs     19  0549 19  0538 19  0546   WBC 3.7 3.9 4.9   RBC 3.91* 4.09 4.37   HGB 11.0* 11.3* 12.1   HCT 35.1* 36.5 38.9   MCV 89.8 89.2 89.0   MCH 28.1 27.6 27.7   MCHC 31.3 31.0 31.1   RDW 12.8 12.7 12.6    172 212   MPV 9.5 9.6 9.9     Chemistry:  Recent Labs     19  0549 19  0538 19  0546   * 133* 134*   K 4.3 4.5 4.3   CL 99 100 98   CO2 26 26 25   GLUCOSE 109* 105* 111*   BUN 18 19 19   CREATININE 0.84 0.82 0.86   ANIONGAP 9 7* 11   LABGLOM >60 >60 >60   GFRAA >60 >60 >60   CALCIUM 9.3 9.1 9.2     No results for input(s): PROT, LABALBU, LABA1C, T7DPODV, Z9GRVPL, FT4, TSH, AST, ALT, LDH, GGT, ALKPHOS, LABGGT, BILITOT, BILIDIR, AMMONIA, AMYLASE, LIPASE, LACTATE, CHOL, HDL, LDLCHOLESTEROL, CHOLHDLRATIO, TRIG, VLDL, SWH28IQ, PHENYTOIN, PHENYF, URICACID, POCGLU in the last 72 hours. ABG:No results found for: POCPH, PHART, PH, POCPCO2, VTP8QEB, PCO2, POCPO2, PO2ART, PO2, POCHCO3, NMA4USU, HCO3, NBEA, PBEA, BEART, BE, THGBART, THB, VTU5LSS, XQXG1BUS, Z6PSWQRG, O2SAT, FIO2  Lab Results   Component Value Date/Time    SPECIAL NOT REPORTED 09/16/2019 04:35 PM     Lab Results   Component Value Date/Time    CULTURE ENTEROBACTER CLOACAE 50 to 100,000 CFU/ML (A) 09/16/2019 04:35 PM    CULTURE (A) 09/16/2019 04:35 PM     ESCHERICHIA COLI 10 to 50,000 CFU/ML THIS ORGANISM IS AN EXTENDED-SPECTRUM BETA-LACTAMASE  AND RESISTANCE TO THERAPY WITH PENICILLINS, CEPHALOSPORINS AND AZTREONAM IS EXPECTED. THESE ORGANISMS GENERALLY REMAIN SUSCEPTIBLE TO CARBAPENEMS. CONSIDER ID CONSULTATION. Radiology:  Xr Chest Portable    Result Date: 9/15/2019  No acute cardiopulmonary abnormality. Xr Chest Portable    Result Date: 9/14/2019  No focal consolidation.        Physical Examination:        General appearance:  alert, cooperative and no distress  Mental Status:  oriented to person, place and time and normal affect  Lungs:  clear to auscultation bilaterally, normal effort  Heart:  regular rate and rhythm, no murmur  Abdomen:  soft, nontender, nondistended, normal bowel sounds, no masses, hepatomegaly, splenomegaly  Extremities:  no edema, redness, tenderness in the calves  Skin:  no gross lesions, rashes, induration    Assessment:        Hospital Problems           Last Modified POA    * (Principal) Acute bronchitis due to Rhinovirus 9/15/2019 Yes    Nonrheumatic aortic valve stenosis 9/14/2019 Yes    Pulmonary hypertension ([address] 9/14/2019 Yes    Atrial fibrillation ([address] 9/14/2019 Yes    Essential hypertension 9/14/2019 Yes    S/P TAVR (transcatheter aortic valve replacement) 9/14/2019 Yes                Plan:        - Ok for discharge today  - No antibitoics  - Oxygen therapry as

## 2019-09-19 NOTE — DISCHARGE SUMMARY
25 09/19/2019    ANIONGAP 11 09/19/2019    BUN 19 09/19/2019    CREATININE 0.86 09/19/2019    BUNCRER 22 09/19/2019    CALCIUM 9.2 09/19/2019    LABGLOM >60 09/19/2019    GFRAA >60 09/19/2019    GFR      09/19/2019    GFR NOT REPORTED 09/19/2019       Radiology:  Xr Chest Portable    Result Date: 9/15/2019  No acute cardiopulmonary abnormality. Xr Chest Portable    Result Date: 9/14/2019  No focal consolidation. Consultations:    Consults:     Final Specialist Recommendations/Findings:   IP CONSULT TO INTERNAL MEDICINE      The patient was seen and examined on day of discharge and this discharge summary is in conjunction with any daily progress note from day of discharge.     Discharge plan:     Disposition: Skilled Facility    Physician Follow Up:     TASHA Dawkins - CNP  [address] [place] [address]  [phone]    Schedule an appointment as soon as possible for a visit in 1 week         Requiring Further Evaluation/Follow Up POST HOSPITALIZATION/Incidental Findings: None    Diet: low sodium diet    Activity: As tolerated with assistance    Discharge Medications:      Medication List      CONTINUE taking these medications    atorvastatin 40 MG tablet  Commonly known as:  LIPITOR  Take 1 tablet by mouth daily     CALTRATE 600+D PO     CENTRUM SILVER ADULT 50+ Tabs     citalopram 20 MG tablet  Commonly known as:  CELEXA  TAKE ONE TABLET BY MOUTH DAILY     clopidogrel 75 MG tablet  Commonly known as:  PLAVIX     lisinopril 10 MG tablet  Commonly known as:  PRINIVIL;ZESTRIL  TAKE ONE TABLET BY MOUTH DAILY     metoprolol succinate 25 MG extended release tablet  Commonly known as:  TOPROL XL  TAKE ONE TABLET BY MOUTH DAILY     pantoprazole 40 MG tablet  Commonly known as:  PROTONIX  TAKE ONE TABLET BY MOUTH EVERY MORNING BEFORE BREAKFAST        STOP taking these medications    acetaminophen-codeine 300-30 MG per tablet  Commonly known as:  TYLENOL #3     amoxicillin-clavulanate 500-125 MG

## 2019-09-19 NOTE — PLAN OF CARE
Problem: Falls - Risk of:  Goal: Will remain free from falls  Description  Will remain free from falls. Fall risk assessment completed. Patient instructed to use call light. Bed locked and in lowest position, side rails up 2/4, call light and bedside table within reach, clutter removed, and non-skid footwear on when pt out of bed. Bed alarm on. Hourly rounds will continue. 9/19/2019 1135 by Tasia Sheth RN  Outcome: Ongoing     Problem: Fluid Volume - Imbalance:  Goal: Absence of imbalanced fluid volume signs and symptoms  Description  Absence of imbalanced fluid volume signs and symptoms.    Outcome: Ongoing

## 2019-09-19 NOTE — PLAN OF CARE
Problem: Fluid Volume - Deficit  Goal: Absence of fluid volume deficit signs and symptoms  Description  Absence of fluid volume deficit signs and symptoms     9/18/2019 1735 by Yvette Osman RN  Outcome: Met This Shift     Problem: Pain:  Goal: Pain level will decrease  Description  Pain level will decrease  9/18/2019 1735 by Yvette Osman RN  Outcome: Met This Shift  Note:   No complaints of pain or discomfort this shift.

## 2019-09-19 NOTE — PROGRESS NOTES
Occupational Therapy    DATE: 2019    NAME: Amparo Mcrae  MRN: [id number]   : 1931      [facility]  Occupational Therapy Not Seen Note    Patient not available for Occupational Therapy due to:    [] Testing:    [] Hemodialysis    [] Cancelled by RN:    [x]Refusal by Patient: Attempted to see pt at 10:30am, pt in bed and stated she was not doing well today and has a headache. Student nurses present and aware of pt complaint. Will attempt later if time allows.     [] Surgery:     [] Intubation:     [] Pain Medication:    [] Sedation:     [] Spine Precautions :    [] Medical Instability:    [] Other:    RAFITA Villagomez/CHERI

## 2019-09-20 LAB
CULTURE: NORMAL
CULTURE: NORMAL
Lab: NORMAL
Lab: NORMAL
SPECIMEN DESCRIPTION: NORMAL
SPECIMEN DESCRIPTION: NORMAL

## 2019-10-18 ENCOUNTER — HOSPITAL ENCOUNTER (OUTPATIENT)
Dept: NON INVASIVE DIAGNOSTICS | Age: 84
Discharge: HOME | End: 2019-10-18
Payer: MEDICARE

## 2019-10-18 PROCEDURE — 93308 TTE F-UP OR LMTD: CPT

## 2020-04-13 RX ORDER — PANTOPRAZOLE SODIUM 40 MG/1
TABLET, DELAYED RELEASE ORAL
Qty: 90 TABLET | Refills: 2 | Status: SHIPPED | OUTPATIENT
Start: 2020-04-13 | End: 2020-07-22

## 2020-07-22 RX ORDER — PANTOPRAZOLE SODIUM 40 MG/1
TABLET, DELAYED RELEASE ORAL
Qty: 90 TABLET | Refills: 0 | Status: SHIPPED | OUTPATIENT
Start: 2020-07-22 | End: 2021-01-22

## 2021-01-22 DIAGNOSIS — K25.3 ANTRAL ULCER, ACUTE: ICD-10-CM

## 2021-01-22 RX ORDER — PANTOPRAZOLE SODIUM 40 MG/1
TABLET, DELAYED RELEASE ORAL
Qty: 90 TABLET | Refills: 0 | Status: SHIPPED | OUTPATIENT
Start: 2021-01-22

## 2021-07-29 NOTE — PROGRESS NOTES
Occupational Therapy   Occupational Therapy Initial Assessment  Date: 2017   Patient Name: Samuel Irvin  MRN: [id number]     : 1931    Patient Diagnosis(es): The primary encounter diagnosis was Gastrointestinal hemorrhage, unspecified gastrointestinal hemorrhage type. A diagnosis of Anemia, unspecified type was also pertinent to this visit. has a past medical history of Arthritis; CAD (coronary artery disease); GERD (gastroesophageal reflux disease); Hyperlipidemia; and Hypertension. has a past surgical history that includes Colonoscopy; hernia repair (2012); joint replacement; Tonsillectomy; Dental surgery; back surgery; Upper gastrointestinal endoscopy; Colon surgery; Carotid endarterectomy (Right); Upper gastrointestinal endoscopy (2017); hernia repair (2013); and egd transoral biopsy single/multiple (N/A, 2017). RN reports patient is medically stable for therapy treatment this date. Chart reviewed prior to treatment and patient is agreeable for therapy. All lines intact and patient positioned comfortably at end of treatment. All patient needs addressed prior to ending therapy session.     Discharge Recommendation:    Home with assist and Home OT     80year-old white gentlewoman with 1 day history of melanotic stools no abdominal pain no diarrhea denies any nausea vomiting no heartburn no dysphagia complains of fatigue denies any chest pain palpitation no shortness of breath           Restrictions  Restrictions/Precautions  Restrictions/Precautions: General Precautions, Up Ad Opal, Fall Risk    Subjective   General  Chart Reviewed: Yes  Patient assessed for rehabilitation services?: Yes  Family / Caregiver Present: Yes  Pain Assessment  Patient Currently in Pain: Denies  Pain Assessment: 0-10  Pain Level: 0  Oxygen Therapy  SpO2: 99 %  Social/Functional History  Social/Functional History  Lives With: Spouse  Type of Home: Mobile home  Home Access: Recommendations:  Home with assist PRN, Home with [address] [place]  Times per week: 4-5x/week for 1-2x/day   Current Treatment Recommendations: Strengthening, Balance Training, Functional Mobility Training, Endurance Training, Safety Education & Training, Neuromuscular Re-education, Patient/Caregiver Education & Training, Home Management Training, Self-Care / ADL, Positioning, Equipment Evaluation, Education, & procurement    G-Code  OT G-codes  Functional Assessment Tool Used: AM-PAC   Score: 19  Functional Limitation: Self care  Self Care Current Status (): At least 40 percent but less than 60 percent impaired, limited or restricted  Self Care Goal Status (): 0 percent impaired, limited or restricted  OutComes Score                                           AM-PAC Score        AM-PAC Inpatient Daily Activity Raw Score: 19  AM-PAC Inpatient ADL T-Scale Score : 40.22  ADL Inpatient CMS 0-100% Score: 42.8  ADL Inpatient CMS G-Code Modifier : CK    Goals  Short term goals  Time Frame for Short term goals: By discharge, pt to demo   Short term goal 1: I with BUE HEP with hand outs as needed to maintain UE strength   Short term goal 2: UB/LB ADL to MOD I with AD/AE as needed. Short term goal 3: Toileting tasks with MOD I with AD/grab bar as needed. Short term goal 4: MOD I with ADL transfers and functional mob with AD as needed. Short term goal 5: balance to good with AD and chloe for 7 mins to reduce fall risk.     Patient Goals   Patient goals : return home with spouse        Therapy Time   Individual Concurrent Group Co-treatment   Time In 1018 (plus 10 min chart review )         Time Out 1046         Minutes [address] [[email or web address]] : General: no apparent distress\par Mental Status: awake and alert, speech fluent and prosodic with no paraphasic errors\par Cranial Nerves: tracks in all directions, face symmetric, no dysarthria\par Motor: no abnormal movements, no orbiting or pronator drift\par Coordination: no dysmetria on finger-nose-finger testing\par Gait: narrow base, normal stance and stride, no Romberg\par \par

## 2022-01-17 ENCOUNTER — HOSPITAL ENCOUNTER (OUTPATIENT)
Age: 87
Setting detail: SPECIMEN
Discharge: HOME | End: 2022-01-17

## 2022-01-17 LAB
INR BLD: 1.7
PROTHROMBIN TIME: 17.6 SEC (ref 9.1–12.3)

## 2022-02-15 ENCOUNTER — HOSPITAL ENCOUNTER (OUTPATIENT)
Age: 87
Setting detail: SPECIMEN
Discharge: HOME | End: 2022-02-15

## 2022-02-15 LAB
POC INR: 1.9
PROTHROMBIN TIME, POC: 22.7 SEC (ref 9.6–14.4)

## 2022-03-28 ENCOUNTER — HOSPITAL ENCOUNTER (OUTPATIENT)
Age: 87
Setting detail: SPECIMEN
Discharge: HOME | End: 2022-03-28

## 2022-03-28 LAB
POC INR: 1.6
PROTHROMBIN TIME, POC: 19.7 SEC (ref 9.6–14.4)

## 2022-04-11 ENCOUNTER — HOSPITAL ENCOUNTER (OUTPATIENT)
Age: 87
Setting detail: SPECIMEN
Discharge: HOME | End: 2022-04-11

## 2022-04-11 LAB
POC INR: 1.7
PROTHROMBIN TIME, POC: 20.6 SEC (ref 9.6–14.4)

## 2022-04-13 ENCOUNTER — HOSPITAL ENCOUNTER (OUTPATIENT)
Age: 87
Setting detail: OBSERVATION
Discharge: HOME | End: 2022-04-14
Attending: EMERGENCY MEDICINE | Admitting: STUDENT IN AN ORGANIZED HEALTH CARE EDUCATION/TRAINING PROGRAM
Payer: MEDICARE

## 2022-04-13 ENCOUNTER — APPOINTMENT (OUTPATIENT)
Dept: GENERAL RADIOLOGY | Age: 87
End: 2022-04-13
Payer: MEDICARE

## 2022-04-13 DIAGNOSIS — E83.42 HYPOMAGNESEMIA: ICD-10-CM

## 2022-04-13 DIAGNOSIS — J81.0 ACUTE PULMONARY EDEMA (HCC): Primary | ICD-10-CM

## 2022-04-13 DIAGNOSIS — R53.83 FATIGUE, UNSPECIFIED TYPE: ICD-10-CM

## 2022-04-13 PROBLEM — I50.9 CONGESTIVE HEART FAILURE (HCC): Status: ACTIVE | Noted: 2022-04-13

## 2022-04-13 PROBLEM — I50.33 ACUTE ON CHRONIC DIASTOLIC CHF (CONGESTIVE HEART FAILURE) (HCC): Status: ACTIVE | Noted: 2022-04-13

## 2022-04-13 LAB
-: ABNORMAL
ABSOLUTE EOS #: 0.04 K/UL (ref 0–0.44)
ABSOLUTE IMMATURE GRANULOCYTE: 0.02 K/UL (ref 0–0.3)
ABSOLUTE LYMPH #: 0.75 K/UL (ref 1.1–3.7)
ABSOLUTE MONO #: 0.73 K/UL (ref 0.1–1.2)
ANION GAP SERPL CALCULATED.3IONS-SCNC: 11 MMOL/L (ref 9–17)
BACTERIA: ABNORMAL
BASOPHILS # BLD: 0 % (ref 0–2)
BASOPHILS ABSOLUTE: 0.03 K/UL (ref 0–0.2)
BILIRUBIN URINE: NEGATIVE
BUN BLDV-MCNC: 20 MG/DL (ref 8–23)
BUN/CREAT BLD: 25 (ref 9–20)
CALCIUM SERPL-MCNC: 9.2 MG/DL (ref 8.6–10.4)
CHLORIDE BLD-SCNC: 102 MMOL/L (ref 98–107)
CO2: 24 MMOL/L (ref 20–31)
COLOR: YELLOW
CREAT SERPL-MCNC: 0.8 MG/DL (ref 0.5–0.9)
EKG ATRIAL RATE: 220 BPM
EKG Q-T INTERVAL: 428 MS
EKG QRS DURATION: 146 MS
EKG QTC CALCULATION (BAZETT): 428 MS
EKG R AXIS: -56 DEGREES
EKG T AXIS: 41 DEGREES
EKG VENTRICULAR RATE: 60 BPM
EOSINOPHILS RELATIVE PERCENT: 1 % (ref 1–4)
EPITHELIAL CELLS UA: ABNORMAL /HPF (ref 0–5)
FLU A ANTIGEN: NEGATIVE
FLU B ANTIGEN: NEGATIVE
GFR AFRICAN AMERICAN: >60 ML/MIN
GFR NON-AFRICAN AMERICAN: >60 ML/MIN
GFR SERPL CREATININE-BSD FRML MDRD: ABNORMAL ML/MIN/{1.73_M2}
GLUCOSE BLD-MCNC: 105 MG/DL (ref 70–99)
GLUCOSE URINE: NEGATIVE
HCT VFR BLD CALC: 36.8 % (ref 36.3–47.1)
HEMOGLOBIN: 11.2 G/DL (ref 11.9–15.1)
IMMATURE GRANULOCYTES: 0 %
INR BLD: 1.7
KETONES, URINE: NEGATIVE
LEUKOCYTE ESTERASE, URINE: NEGATIVE
LV EF: 60 %
LVEF MODALITY: NORMAL
LYMPHOCYTES # BLD: 10 % (ref 24–43)
MCH RBC QN AUTO: 27.2 PG (ref 25.2–33.5)
MCHC RBC AUTO-ENTMCNC: 30.4 G/DL (ref 28.4–34.8)
MCV RBC AUTO: 89.3 FL (ref 82.6–102.9)
MONOCYTES # BLD: 10 % (ref 3–12)
MYOGLOBIN: 44 NG/ML (ref 25–58)
NITRITE, URINE: NEGATIVE
NRBC AUTOMATED: 0 PER 100 WBC
PDW BLD-RTO: 13.8 % (ref 11.8–14.4)
PH UA: 6 (ref 5–8)
PLATELET # BLD: 145 K/UL (ref 138–453)
PMV BLD AUTO: 9.9 FL (ref 8.1–13.5)
POTASSIUM SERPL-SCNC: 4.7 MMOL/L (ref 3.7–5.3)
PRO-BNP: 3762 PG/ML
PROTEIN UA: NEGATIVE
PROTHROMBIN TIME: 19.6 SEC (ref 11.5–14.2)
RBC # BLD: 4.12 M/UL (ref 3.95–5.11)
RBC UA: ABNORMAL /HPF (ref 0–2)
SEG NEUTROPHILS: 79 % (ref 36–65)
SEGMENTED NEUTROPHILS ABSOLUTE COUNT: 5.86 K/UL (ref 1.5–8.1)
SODIUM BLD-SCNC: 137 MMOL/L (ref 135–144)
SPECIFIC GRAVITY UA: 1.02 (ref 1–1.03)
TROPONIN, HIGH SENSITIVITY: 24 NG/L (ref 0–14)
TURBIDITY: ABNORMAL
URINE HGB: NEGATIVE
UROBILINOGEN, URINE: NORMAL
WBC # BLD: 7.4 K/UL (ref 3.5–11.3)
WBC UA: ABNORMAL /HPF (ref 0–5)

## 2022-04-13 PROCEDURE — 83880 ASSAY OF NATRIURETIC PEPTIDE: CPT

## 2022-04-13 PROCEDURE — 93306 TTE W/DOPPLER COMPLETE: CPT

## 2022-04-13 PROCEDURE — 81001 URINALYSIS AUTO W/SCOPE: CPT

## 2022-04-13 PROCEDURE — 6360000002 HC RX W HCPCS: Performed by: NURSE PRACTITIONER

## 2022-04-13 PROCEDURE — 87804 INFLUENZA ASSAY W/OPTIC: CPT

## 2022-04-13 PROCEDURE — 96374 THER/PROPH/DIAG INJ IV PUSH: CPT

## 2022-04-13 PROCEDURE — 2580000003 HC RX 258: Performed by: NURSE PRACTITIONER

## 2022-04-13 PROCEDURE — 80048 BASIC METABOLIC PNL TOTAL CA: CPT

## 2022-04-13 PROCEDURE — 85025 COMPLETE CBC W/AUTO DIFF WBC: CPT

## 2022-04-13 PROCEDURE — 2580000003 HC RX 258: Performed by: EMERGENCY MEDICINE

## 2022-04-13 PROCEDURE — 6360000002 HC RX W HCPCS: Performed by: EMERGENCY MEDICINE

## 2022-04-13 PROCEDURE — 99222 1ST HOSP IP/OBS MODERATE 55: CPT | Performed by: NURSE PRACTITIONER

## 2022-04-13 PROCEDURE — 96375 TX/PRO/DX INJ NEW DRUG ADDON: CPT

## 2022-04-13 PROCEDURE — 6370000000 HC RX 637 (ALT 250 FOR IP): Performed by: NURSE PRACTITIONER

## 2022-04-13 PROCEDURE — G0378 HOSPITAL OBSERVATION PER HR: HCPCS

## 2022-04-13 PROCEDURE — 93005 ELECTROCARDIOGRAM TRACING: CPT | Performed by: EMERGENCY MEDICINE

## 2022-04-13 PROCEDURE — 71045 X-RAY EXAM CHEST 1 VIEW: CPT

## 2022-04-13 PROCEDURE — 96376 TX/PRO/DX INJ SAME DRUG ADON: CPT

## 2022-04-13 PROCEDURE — 99283 EMERGENCY DEPT VISIT LOW MDM: CPT

## 2022-04-13 PROCEDURE — 93010 ELECTROCARDIOGRAM REPORT: CPT | Performed by: INTERNAL MEDICINE

## 2022-04-13 PROCEDURE — 2060000000 HC ICU INTERMEDIATE R&B

## 2022-04-13 PROCEDURE — 84484 ASSAY OF TROPONIN QUANT: CPT

## 2022-04-13 PROCEDURE — 83874 ASSAY OF MYOGLOBIN: CPT

## 2022-04-13 PROCEDURE — 85610 PROTHROMBIN TIME: CPT

## 2022-04-13 RX ORDER — WARFARIN SODIUM 2 MG/1
6 TABLET ORAL
COMMUNITY

## 2022-04-13 RX ORDER — ACETAMINOPHEN 650 MG/1
650 SUPPOSITORY RECTAL EVERY 6 HOURS PRN
Status: DISCONTINUED | OUTPATIENT
Start: 2022-04-13 | End: 2022-04-14 | Stop reason: HOSPADM

## 2022-04-13 RX ORDER — CLOPIDOGREL BISULFATE 75 MG/1
75 TABLET ORAL DAILY
Status: DISCONTINUED | OUTPATIENT
Start: 2022-04-13 | End: 2022-04-14

## 2022-04-13 RX ORDER — ONDANSETRON 4 MG/1
4 TABLET, ORALLY DISINTEGRATING ORAL EVERY 8 HOURS PRN
Status: DISCONTINUED | OUTPATIENT
Start: 2022-04-13 | End: 2022-04-14 | Stop reason: HOSPADM

## 2022-04-13 RX ORDER — FUROSEMIDE 10 MG/ML
20 INJECTION INTRAMUSCULAR; INTRAVENOUS ONCE
Status: COMPLETED | OUTPATIENT
Start: 2022-04-13 | End: 2022-04-13

## 2022-04-13 RX ORDER — SENNOSIDES 8.6 MG
650 CAPSULE ORAL EVERY 8 HOURS PRN
COMMUNITY

## 2022-04-13 RX ORDER — WARFARIN SODIUM 2 MG/1
4 TABLET ORAL
COMMUNITY

## 2022-04-13 RX ORDER — POLYETHYLENE GLYCOL 3350 17 G/17G
17 POWDER, FOR SOLUTION ORAL DAILY PRN
Status: DISCONTINUED | OUTPATIENT
Start: 2022-04-13 | End: 2022-04-14 | Stop reason: HOSPADM

## 2022-04-13 RX ORDER — FUROSEMIDE 10 MG/ML
20 INJECTION INTRAMUSCULAR; INTRAVENOUS 2 TIMES DAILY
Status: DISCONTINUED | OUTPATIENT
Start: 2022-04-13 | End: 2022-04-14

## 2022-04-13 RX ORDER — SODIUM CHLORIDE 0.9 % (FLUSH) 0.9 %
10 SYRINGE (ML) INJECTION PRN
Status: DISCONTINUED | OUTPATIENT
Start: 2022-04-13 | End: 2022-04-14 | Stop reason: HOSPADM

## 2022-04-13 RX ORDER — SODIUM CHLORIDE 9 MG/ML
INJECTION, SOLUTION INTRAVENOUS PRN
Status: DISCONTINUED | OUTPATIENT
Start: 2022-04-13 | End: 2022-04-14 | Stop reason: HOSPADM

## 2022-04-13 RX ORDER — SODIUM CHLORIDE 9 MG/ML
INJECTION, SOLUTION INTRAVENOUS CONTINUOUS
Status: DISCONTINUED | OUTPATIENT
Start: 2022-04-13 | End: 2022-04-13

## 2022-04-13 RX ORDER — PANTOPRAZOLE SODIUM 40 MG/1
40 TABLET, DELAYED RELEASE ORAL
Status: DISCONTINUED | OUTPATIENT
Start: 2022-04-14 | End: 2022-04-14 | Stop reason: HOSPADM

## 2022-04-13 RX ORDER — WARFARIN SODIUM 3 MG/1
6 TABLET ORAL
Status: DISCONTINUED | OUTPATIENT
Start: 2022-04-15 | End: 2022-04-14 | Stop reason: HOSPADM

## 2022-04-13 RX ORDER — ACETAMINOPHEN 325 MG/1
650 TABLET ORAL EVERY 6 HOURS PRN
Status: DISCONTINUED | OUTPATIENT
Start: 2022-04-13 | End: 2022-04-14 | Stop reason: HOSPADM

## 2022-04-13 RX ORDER — ONDANSETRON 2 MG/ML
4 INJECTION INTRAMUSCULAR; INTRAVENOUS EVERY 6 HOURS PRN
Status: DISCONTINUED | OUTPATIENT
Start: 2022-04-13 | End: 2022-04-14 | Stop reason: HOSPADM

## 2022-04-13 RX ORDER — SODIUM CHLORIDE 0.9 % (FLUSH) 0.9 %
5-40 SYRINGE (ML) INJECTION EVERY 12 HOURS SCHEDULED
Status: DISCONTINUED | OUTPATIENT
Start: 2022-04-13 | End: 2022-04-14 | Stop reason: HOSPADM

## 2022-04-13 RX ORDER — CITALOPRAM 20 MG/1
20 TABLET ORAL DAILY
Status: DISCONTINUED | OUTPATIENT
Start: 2022-04-13 | End: 2022-04-14 | Stop reason: HOSPADM

## 2022-04-13 RX ORDER — ATORVASTATIN CALCIUM 40 MG/1
40 TABLET, FILM COATED ORAL DAILY
Status: DISCONTINUED | OUTPATIENT
Start: 2022-04-13 | End: 2022-04-14 | Stop reason: HOSPADM

## 2022-04-13 RX ORDER — WARFARIN SODIUM 2 MG/1
4 TABLET ORAL
Status: DISCONTINUED | OUTPATIENT
Start: 2022-04-13 | End: 2022-04-14 | Stop reason: HOSPADM

## 2022-04-13 RX ORDER — METOPROLOL SUCCINATE 25 MG/1
25 TABLET, EXTENDED RELEASE ORAL DAILY
Status: DISCONTINUED | OUTPATIENT
Start: 2022-04-13 | End: 2022-04-14 | Stop reason: HOSPADM

## 2022-04-13 RX ORDER — LISINOPRIL 10 MG/1
10 TABLET ORAL DAILY
Status: DISCONTINUED | OUTPATIENT
Start: 2022-04-13 | End: 2022-04-14 | Stop reason: HOSPADM

## 2022-04-13 RX ORDER — M-VIT,TX,IRON,MINS/CALC/FOLIC 27MG-0.4MG
1 TABLET ORAL DAILY
Status: DISCONTINUED | OUTPATIENT
Start: 2022-04-13 | End: 2022-04-14 | Stop reason: HOSPADM

## 2022-04-13 RX ADMIN — METOPROLOL SUCCINATE 25 MG: 25 TABLET, EXTENDED RELEASE ORAL at 17:50

## 2022-04-13 RX ADMIN — ATORVASTATIN CALCIUM 40 MG: 40 TABLET, FILM COATED ORAL at 17:50

## 2022-04-13 RX ADMIN — LISINOPRIL 10 MG: 10 TABLET ORAL at 17:49

## 2022-04-13 RX ADMIN — CLOPIDOGREL BISULFATE 75 MG: 75 TABLET ORAL at 17:50

## 2022-04-13 RX ADMIN — MULTIPLE VITAMINS W/ MINERALS TAB 1 TABLET: TAB at 17:50

## 2022-04-13 RX ADMIN — WARFARIN SODIUM 4 MG: 2 TABLET ORAL at 17:50

## 2022-04-13 RX ADMIN — CITALOPRAM HYDROBROMIDE 20 MG: 20 TABLET ORAL at 17:50

## 2022-04-13 RX ADMIN — FUROSEMIDE 20 MG: 10 INJECTION, SOLUTION INTRAMUSCULAR; INTRAVENOUS at 20:41

## 2022-04-13 RX ADMIN — FUROSEMIDE 20 MG: 10 INJECTION, SOLUTION INTRAVENOUS at 13:45

## 2022-04-13 RX ADMIN — SODIUM CHLORIDE: 9 INJECTION, SOLUTION INTRAVENOUS at 11:47

## 2022-04-13 RX ADMIN — SODIUM CHLORIDE, PRESERVATIVE FREE 10 ML: 5 INJECTION INTRAVENOUS at 20:42

## 2022-04-13 ASSESSMENT — ENCOUNTER SYMPTOMS
VOMITING: 0
CONSTIPATION: 0
WHEEZING: 0
FACIAL SWELLING: 0
BLOOD IN STOOL: 0
EYE DISCHARGE: 0
SHORTNESS OF BREATH: 1
EYE REDNESS: 0
COLOR CHANGE: 0
COUGH: 0
SHORTNESS OF BREATH: 0
NAUSEA: 0
ABDOMINAL PAIN: 0
DIARRHEA: 0
STRIDOR: 0

## 2022-04-13 ASSESSMENT — PAIN SCALES - GENERAL: PAINLEVEL_OUTOF10: 0

## 2022-04-13 NOTE — CONSULTS
Warfarin Dosing - Pharmacy Consult Note  Consulting Provider: Dr. Genie Dakin  Indication:  Atrial Fibrillation  Warfarin Dose prior to admission: yes   Concurrent anticoagulants/antiplatelets: N/A  Significant Drug Interactions: No obvious interactions  Recent Labs     04/11/22  0910 04/13/22  1135   INR 1.7 1.7   HGB  --  11.2*   PLT  --  145     Recent warfarin administrations        No warfarin orders with administrations found. Orders not given:            warfarin (COUMADIN) tablet 6 mg    warfarin (COUMADIN) tablet 4 mg    warfarin placeholder: dosing by pharmacy                   Date   INR    Dose  4/13/22   1.7     4 mg    Assessment/Plan  (Goal INR: 2 - 3)  Will resume patient's home warfarin regimen of 6 mg Mon/Fri and 4 mg all other days. Patient will receive a 4 mg dose today. Active problem list reviewed. INR orders are placed. Chart reviewed for pertinent labs, drug/diet interactions, and past doses. Documentation of patient's clinical condition was reviewed. Pharmacy Dosing:  Pharmacy will continue to follow.

## 2022-04-13 NOTE — H&P
[facility]  Office: [address], DO, Mae Morris, DO, Juwan Jean, DO, Florencesiva Remedsatya Blood, DO, Gab Brewer MD, Varun Hill MD, Alanna Dominguez MD, Snow Vega MD, Shai Hernandes MD, Lopez Chinchilla MD, Paris Tilley MD, Amira Chin, DO, Thea Yao, DO, Dipti Birch MD,  Brandon Carrasquillo, DO, Bucky Vernon MD, Melvern Essex, MD, Anna Prince MD, Rafa Vazquez, DO, Laure Berry MD, Jasvir Lozada MD, Adelaida Zimmer [facility], [facility], [facility], Emerald Veras, [facility], Jackie Narayan, CNP, Reji Ventura, CNP, Dalia Aguilar, CNP, Kamille Arita PA-C, Andrew Banks [facility], Amish Thorne, CNP, Aaliyah Rios, CNP, Dori Muniz, CNP, Kasandra Bolanos, CNS, Fred Solorzano [facility], Alta Eason, CNP, Aysha Valenzuela, CNP, Jadon Montenegro, [facility]    HISTORY AND PHYSICAL EXAMINATION            Date:   4/13/2022  Patient name:  Lucía Mckeon  Date of admission:  4/13/2022 10:27 AM  MRN:   [id number]  Account:  [de-identified]  YOB: 1931  PCP:    TASHA Parker CNP  Room:   [address] [place] [address]-  Code Status:    Full Code    Chief Complaint:     Chief Complaint   Patient presents with    Fatigue     Woke up today feeling \"tired\"; denies any other complaints        History Obtained From:     patient, family member - son    History of Present Illness:     Lucía Mckeon is a 80 y.o. Non- / non  female who presents with Fatigue (Woke up today feeling \"tired\"; denies any other complaints )   and is admitted to the hospital for the management of Acute on chronic diastolic CHF (congestive heart failure) ([facility] [address] Patient presented the ER with her son with complaints of fatigue. According to the patient she does not really have any specific complaints she just does not feel good but her son is at bedside and states that she seems short of breath to him.   She does have 2+ pitting edema to her lower extremities but during my assessment her lungs are clear. She is on room air and does not seem particular short of breath. The patient's son cardiology has been adjusting her Coumadin dosing related to subtherapeutic INRs. Temp 37.2, /59 pulse 65  BNP is 3762 her previous BNP in 2019 was 2697  BUN/creatinine within normal limits  No white count  Chest x-ray is concerning for interstitial and mild alveolar pulmonary edema and a superimposed infection cannot be excluded. There is also cardiomegaly that is markedly increased related to prior exams. Recommend correlation for possible pericardial effusion. Also mention of small bilateral pleural effusions. Patient was given Lasix 20 mg IV in the ER    Echo October 18, 2019   Normal left ventricle size, wall thickness and function with an estimated EF  55%. No segmental wall motion abnormalities seen. TAVR with mildly increased velocities. Peak instantaneous gradient 33 mmHg and mean gradient 16 mmHg. Moderate perivalvular leak.   No pericardial effusion  Some views were technically difficult    09/28/2018 [employer] pacemaker implant for the indication of sinus node dysfunction and intermittent complete heart block in the postoperative period status post TAVR (9/28/2018)            Past Medical History:     Past Medical History:   Diagnosis Date    Antral ulcer, acute 12/18/2017    per EGD    Arthritis     CAD (coronary artery disease)     GERD (gastroesophageal reflux disease)     Hyperlipidemia     Hypertension         Past Surgical History:     Past Surgical History:   Procedure Laterality Date    AORTIC VALVE REPAIR      tavr    BACK SURGERY      broke her back    CAROTID ENDARTERECTOMY Right     COLON SURGERY      COLONOSCOPY      DENTAL SURGERY      top dentures    DENTAL SURGERY  05/21/2018    6 teeth removed    HERNIA REPAIR  06/20/2012    HERNIA REPAIR  06/18/2013    JOINT REPLACEMENT      [place] EGD TRANSORAL BIOPSY SINGLE/MULTIPLE N/A 12/18/2017    EGD BIOPSY performed by Gomez Castro MD at [place] [address] ENDOSCOPY  12/18/2017    1.5 cm antral ulcer,clean base    UPPER GASTROINTESTINAL ENDOSCOPY N/A 4/12/2018    EGD BIOPSY performed by Gomez Castro MD at [address] [place] [address]  04/12/2018    Ulcer appears to be almost healed. There is some inflammation at the site of ulcer. Medications Prior to Admission:     Prior to Admission medications    Medication Sig Start Date End Date Taking?  Authorizing Provider   warfarin (COUMADIN) 2 MG tablet Take 6 mg by mouth Twice a Week Monday AND FRIDAY   Yes Historical Provider, MD   warfarin (COUMADIN) 2 MG tablet Take 4 mg by mouth Five times weekly All other days   Yes Historical Provider, MD   acetaminophen (TYLENOL 8 HOUR ARTHRITIS PAIN) 650 MG extended release tablet Take 650 mg by mouth every 8 hours as needed for Pain   Yes Historical Provider, MD   pantoprazole (PROTONIX) 40 MG tablet TAKE ONE TABLET BY MOUTH EVERY MORNING BEFORE BREAKFAST  Patient taking differently: Take 40 mg by mouth every morning (before breakfast) TAKE ONE TABLET BY MOUTH EVERY MORNING BEFORE BREAKFAST 1/22/21   TASHA Benavides NP   clopidogrel (PLAVIX) 75 MG tablet Take 75 mg by mouth daily    Historical Provider, MD   citalopram (CELEXA) 20 MG tablet TAKE ONE TABLET BY MOUTH DAILY  Patient taking differently: Take 20 mg by mouth daily  4/20/17   Jaime Meléndez MD   atorvastatin (LIPITOR) 40 MG tablet Take 1 tablet by mouth daily 1/10/17   Jaime Meléndez MD   lisinopril (PRINIVIL;ZESTRIL) 10 MG tablet TAKE ONE TABLET BY MOUTH DAILY  Patient taking differently: Take 10 mg by mouth daily TAKE ONE TABLET BY MOUTH DAILY 1/10/17   Jaime Meléndez MD   metoprolol succinate (TOPROL XL) 25 MG extended release tablet TAKE ONE TABLET BY MOUTH DAILY  Patient taking differently: Take 25 mg by mouth daily  16   Jen Almeida MD   Calcium Carbonate-Vitamin D (CALTRATE 600+D PO) Take 1 tablet by mouth 2 times daily. Historical Provider, MD   Multiple Vitamins-Minerals (CENTRUM SILVER ADULT 50+) TABS Take 1 tablet by mouth daily. Historical Provider, MD        Allergies:     Patient has no known allergies. Social History:     Tobacco:    reports that she has never smoked. She has never used smokeless tobacco.  Alcohol:      reports no history of alcohol use. Drug Use:  reports no history of drug use. Family History:     Family History   Problem Relation Age of Onset    Heart Disease Mother     Heart Disease Sister     Heart Disease Brother        Review of Systems:     Positive and Negative as described in HPI. Review of Systems   Constitutional: Positive for fatigue. Negative for chills, diaphoresis and fever. HENT: Negative for congestion and hearing loss. Respiratory: Positive for shortness of breath. Negative for cough, wheezing and stridor. Cardiovascular: Negative for chest pain, palpitations and leg swelling. Gastrointestinal: Negative for abdominal pain, blood in stool, constipation, diarrhea, nausea and vomiting. Genitourinary: Negative for dysuria and frequency. Musculoskeletal: Negative for myalgias. Skin: Negative for rash. Neurological: Negative for dizziness, seizures and headaches. Psychiatric/Behavioral: The patient is not nervous/anxious. Physical Exam:   BP (!) 159/51   Pulse 60   Temp 98.1 °F (36.7 °C) (Oral)   Resp 16   Ht 5' 3\" (1.6 m)   Wt 186 lb 11.2 oz (84.7 kg)   SpO2 98%   BMI 33.07 kg/m²   Temp (24hrs), Av.6 °F (37 °C), Min:98.1 °F (36.7 °C), Max:99 °F (37.2 °C)    No results for input(s): POCGLU in the last 72 hours.     Intake/Output Summary (Last 24 hours) at 2022 1921  Last data filed at 2022 1658  Gross per 24 hour   Intake --   Output 300 ml   Net -300 ml Physical Exam  Vitals and nursing note reviewed. HENT:      Mouth/Throat:      Mouth: Mucous membranes are moist.   Eyes:      Extraocular Movements: Extraocular movements intact. Cardiovascular:      Rate and Rhythm: Normal rate and regular rhythm. Pulses: Normal pulses. Heart sounds: Normal heart sounds. Pulmonary:      Effort: Pulmonary effort is normal.      Breath sounds: Normal breath sounds. Abdominal:      General: Bowel sounds are normal.      Palpations: Abdomen is soft. Musculoskeletal:      Right lower le+ Pitting Edema present. Left lower le+ Pitting Edema present. Skin:     General: Skin is warm and dry. Capillary Refill: Capillary refill takes less than 2 seconds. Neurological:      General: No focal deficit present. Mental Status: She is alert. Mental status is at baseline. Psychiatric:         Behavior: Behavior is cooperative.          Investigations:      Laboratory Testing:  Recent Results (from the past 24 hour(s))   EKG 12 Lead    Collection Time: 22 11:05 AM   Result Value Ref Range    Ventricular Rate 60 BPM    Atrial Rate 220 BPM    QRS Duration 146 ms    Q-T Interval 428 ms    QTc Calculation (Bazett) 428 ms    R Axis -56 degrees    T Axis 41 degrees   Flu A/B Ag Detection    Collection Time: 22 11:10 AM    Specimen: Nasopharyngeal   Result Value Ref Range    Flu A Antigen NEGATIVE NEGATIVE    Flu B Antigen NEGATIVE NEGATIVE   CBC with Auto Differential    Collection Time: 22 11:35 AM   Result Value Ref Range    WBC 7.4 3.5 - 11.3 k/uL    RBC 4.12 3.95 - 5.11 m/uL    Hemoglobin 11.2 (L) 11.9 - 15.1 g/dL    Hematocrit 36.8 36.3 - 47.1 %    MCV 89.3 82.6 - 102.9 fL    MCH 27.2 25.2 - 33.5 pg    MCHC 30.4 28.4 - 34.8 g/dL    RDW 13.8 11.8 - 14.4 %    Platelets 326 893 - 854 k/uL    MPV 9.9 8.1 - 13.5 fL    NRBC Automated 0.0 0.0 per 100 WBC    Seg Neutrophils 79 (H) 36 - 65 %    Lymphocytes 10 (L) 24 - 43 %    Monocytes 10 3 - 12 %    Eosinophils % 1 1 - 4 %    Basophils 0 0 - 2 %    Immature Granulocytes 0 0 %    Segs Absolute 5.86 1.50 - 8.10 k/uL    Absolute Lymph # 0.75 (L) 1.10 - 3.70 k/uL    Absolute Mono # 0.73 0.10 - 1.20 k/uL    Absolute Eos # 0.04 0.00 - 0.44 k/uL    Basophils Absolute 0.03 0.00 - 0.20 k/uL    Absolute Immature Granulocyte 0.02 0.00 - 0.30 k/uL   Basic Metabolic Panel    Collection Time: 04/13/22 11:35 AM   Result Value Ref Range    Glucose 105 (H) 70 - 99 mg/dL    BUN 20 8 - 23 mg/dL    CREATININE 0.80 0.50 - 0.90 mg/dL    Bun/Cre Ratio 25 (H) 9 - 20    Calcium 9.2 8.6 - 10.4 mg/dL    Sodium 137 135 - 144 mmol/L    Potassium 4.7 3.7 - 5.3 mmol/L    Chloride 102 98 - 107 mmol/L    CO2 24 20 - 31 mmol/L    Anion Gap 11 9 - 17 mmol/L    GFR Non-African American >60 >60 mL/min    GFR African American >60 >60 mL/min    GFR Comment         Protime-INR    Collection Time: 04/13/22 11:35 AM   Result Value Ref Range    Protime 19.6 (H) 11.5 - 14.2 sec    INR 1.7    Brain Natriuretic Peptide    Collection Time: 04/13/22 11:35 AM   Result Value Ref Range    Pro-BNP 3,762 (H) <300 pg/mL   TROP/MYOGLOBIN    Collection Time: 04/13/22 11:35 AM   Result Value Ref Range    Troponin, High Sensitivity 24 (H) 0 - 14 ng/L    Myoglobin 44 25 - 58 ng/mL   Urinalysis    Collection Time: 04/13/22  4:15 PM   Result Value Ref Range    Color, UA Yellow Yellow    Turbidity UA SLIGHTLY CLOUDY (A) Clear    Glucose, Ur NEGATIVE NEGATIVE    Bilirubin Urine NEGATIVE NEGATIVE    Ketones, Urine NEGATIVE NEGATIVE    Specific Gravity, UA 1.020 1.005 - 1.030    Urine Hgb NEGATIVE NEGATIVE    pH, UA 6.0 5.0 - 8.0    Protein, UA NEGATIVE NEGATIVE    Urobilinogen, Urine Normal Normal    Nitrite, Urine NEGATIVE NEGATIVE    Leukocyte Esterase, Urine NEGATIVE NEGATIVE   Microscopic Urinalysis    Collection Time: 04/13/22  4:15 PM   Result Value Ref Range    -          WBC, UA 0 TO 2 0 - 5 /HPF    RBC, UA 0 TO 2 0 - 2 /HPF    Epithelial Cells UA 2 TO 5 0 - 5 /HPF    Bacteria, UA MANY (A) None       Imaging/Diagnostics:  XR CHEST PORTABLE    Result Date: 4/13/2022  Interstitial and mild alveolar pulmonary edema. Clinical correlation is required to exclude superimposed infection. Cardiomegaly which is markedly increased relative to prior comparison exams on a background of prior TAVR. Correlate for possible pericardial effusion. Suspected small bilateral pleural effusions. Assessment :      Hospital Problems           Last Modified POA    * (Principal) Acute on chronic diastolic CHF (congestive heart failure) ([address] 4/13/2022 Yes    Hypertension 4/13/2022 Yes    Pacemaker 4/13/2022 Yes    CAD (coronary artery disease) (Chronic) 4/13/2022 Yes          Plan:     Patient status inpatient in the  Progressive Unit/Step down    Acute chronic diastolic heart failure  Lasix 20 mg IV twice daily  Monitor and replace electrolytes as needed  Monitor I&O  Continue home ACE  BMP in am  Repeat echo pending related to findings of possible pericardial effusion    Essential hypertension  Home lisinopril and beta-blocker with parameters    CAD/pacemaker  Resume home statin, Plavix     A. fib with chronic anticoagulation  Continue home Coumadin with pharmacy dosing  Daily INR      Consultations:   IP CONSULT TO HOSPITALIST  PHARMACY TO DOSE WARFARIN  IP CONSULT TO HEART FAILURE NURSE/COORDINATOR  IP CONSULT TO DIETITIAN     Patient is admitted as inpatient status because of co-morbidities listed above, severity of signs and symptoms as outlined, requirement for current medical therapies and most importantly because of direct risk to patient if care not provided in a hospital setting. Expected length of stay > 48 hours.     TASHA Montiel CNP  4/13/2022  7:21 PM    Copy sent to TASHA Guardado CNP

## 2022-04-13 NOTE — ED PROVIDER NOTES
[address] [place] [address] ED  EMERGENCY DEPARTMENT ENCOUNTER      Pt Name: Morales Oden  MRN: [id number]  [place] 6/17/1931  Date of evaluation: 4/13/2022  Provider: Miko Corbett MD    CHIEF COMPLAINT       Chief Complaint   Patient presents with    Fatigue     Woke up today feeling \"tired\"; denies any other complaints          HISTORY OF PRESENT ILLNESS  (Location/Symptom, Timing/Onset, Context/Setting, Quality, Duration, Modifying Factors, Severity.)   Morales Oden is a 80 y.o. female who presents to the emergency department for fatigue. It began this morning. Yesterday she had run some errands with her son and was fine. She woke up today and she is fatigued. She cannot explain it anymore other than she just does not feel good. She does not seem to complain of any pain and has not had a fever or cough or vomiting or diarrhea. No dysuria. Son states that he knew he would not be able to get her into the PCPs office so he brought her here to get checked. No syncope. Nursing Notes were reviewed. ALLERGIES     Patient has no known allergies. CURRENT MEDICATIONS       Previous Medications    ATORVASTATIN (LIPITOR) 40 MG TABLET    Take 1 tablet by mouth daily    CALCIUM CARBONATE-VITAMIN D (CALTRATE 600+D PO)    Take 1 tablet by mouth 2 times daily. CITALOPRAM (CELEXA) 20 MG TABLET    TAKE ONE TABLET BY MOUTH DAILY    CLOPIDOGREL (PLAVIX) 75 MG TABLET    Take 75 mg by mouth daily    LISINOPRIL (PRINIVIL;ZESTRIL) 10 MG TABLET    TAKE ONE TABLET BY MOUTH DAILY    METOPROLOL SUCCINATE (TOPROL XL) 25 MG EXTENDED RELEASE TABLET    TAKE ONE TABLET BY MOUTH DAILY    MULTIPLE VITAMINS-MINERALS (CENTRUM SILVER ADULT 50+) TABS    Take 1 tablet by mouth daily.     PANTOPRAZOLE (PROTONIX) 40 MG TABLET    TAKE ONE TABLET BY MOUTH EVERY MORNING BEFORE BREAKFAST       PAST MEDICAL HISTORY         Diagnosis Date    Antral ulcer, acute 12/18/2017    per EGD    Arthritis     CAD (coronary artery Musculoskeletal: Negative for arthralgias. Skin: Negative for color change and rash. Neurological: Negative for syncope, numbness and headaches. Hematological: Negative for adenopathy. Psychiatric/Behavioral: Negative for confusion. The patient is not nervous/anxious. Except as noted above the remainder of the review of systems was reviewed and negative. PHYSICAL EXAM    (up to 7 for level 4, 8 or more for level 5)     Vitals:    04/13/22 1022   BP: (!) 182/59   Pulse: 65   Resp: 18   Temp: 99 °F (37.2 °C)   TempSrc: Oral   SpO2: 99%   Weight: 186 lb 11.2 oz (84.7 kg)   Height: 5' 3\" (1.6 m)       Physical Exam  Vitals reviewed. Constitutional:       General: She is not in acute distress. Appearance: She is well-developed. She is not diaphoretic. HENT:      Head: Normocephalic and atraumatic. Eyes:      General: No scleral icterus. Right eye: No discharge. Left eye: No discharge. Cardiovascular:      Rate and Rhythm: Normal rate. Pulmonary:      Effort: Pulmonary effort is normal. No respiratory distress. Breath sounds: Normal breath sounds. No stridor. No wheezing or rales. Abdominal:      General: There is no distension. Palpations: Abdomen is soft. Tenderness: There is no abdominal tenderness. Musculoskeletal:         General: Normal range of motion. Cervical back: Neck supple. Lymphadenopathy:      Cervical: No cervical adenopathy. Skin:     General: Skin is warm and dry. Findings: No erythema or rash. Neurological:      Mental Status: She is alert.       Comments: Awake and alert   Psychiatric:         Behavior: Behavior normal.             DIAGNOSTIC RESULTS     EKG: All EKG's are interpreted by the Emergency Department Physician who either signs or Co-signs this chart in the absence of a cardiologist.    EKG on my interpretation shows paced rhythm    RADIOLOGY:   Non-plain film images such as CT, Ultrasound and MRI are read by the radiologist. Dimas Kang radiographic images are visualized and preliminarily interpreted by the emergency physician with the below findings:    Interpretation per the Radiologist below, if available at the time of this note:    XR CHEST PORTABLE    Result Date: 4/13/2022  EXAMINATION: [address] 4/13/2022 8:00 am COMPARISON: 09/15/2019, 03/13/2016 HISTORY: ORDERING SYSTEM PROVIDED HISTORY: Weakness TECHNOLOGIST PROVIDED HISTORY: Weakness Reason for Exam: weakness FINDINGS: Pulmonary vascular and diffuse interstitial prominence. Patchy perihilar and bibasilar pulmonary opacities. Suspected small bilateral effusions. No pneumothorax. Cardiomegaly which is markedly increased relative to the prior comparison. Prior TAVR. Atherosclerotic ectatic aorta. Dual lead left chest pacing device appears grossly unchanged in configuration. No gross acute bony findings. Surgical clips in the right-sided cervical soft tissues. Interstitial and mild alveolar pulmonary edema. Clinical correlation is required to exclude superimposed infection. Cardiomegaly which is markedly increased relative to prior comparison exams on a background of prior TAVR. Correlate for possible pericardial effusion. Suspected small bilateral pleural effusions.        LABS:  Labs Reviewed   CBC WITH AUTO DIFFERENTIAL - Abnormal; Notable for the following components:       Result Value    Hemoglobin 11.2 (*)     Seg Neutrophils 79 (*)     Lymphocytes 10 (*)     Absolute Lymph # 0.75 (*)     All other components within normal limits   BASIC METABOLIC PANEL - Abnormal; Notable for the following components:    Glucose 105 (*)     Bun/Cre Ratio 25 (*)     All other components within normal limits   PROTIME-INR - Abnormal; Notable for the following components:    Protime 19.6 (*)     All other components within normal limits   BRAIN NATRIURETIC PEPTIDE - Abnormal; Notable for the following components:    Pro-BNP 3,762 (*)     All other components within normal limits   TROP/MYOGLOBIN - Abnormal; Notable for the following components:    Troponin, High Sensitivity 24 (*)     All other components within normal limits   RAPID INFLUENZA A/B ANTIGENS   URINALYSIS       All other labs were within normal range or not returned as of this dictation. EMERGENCY DEPARTMENT COURSE and DIFFERENTIAL DIAGNOSIS/MDM:   Vitals:    Vitals:    04/13/22 1022   BP: (!) 182/59   Pulse: 65   Resp: 18   Temp: 99 °F (37.2 °C)   TempSrc: Oral   SpO2: 99%   Weight: 186 lb 11.2 oz (84.7 kg)   Height: 5' 3\" (1.6 m)       Orders Placed This Encounter   Medications    0.9 % sodium chloride infusion    furosemide (LASIX) injection 20 mg       Medical Decision Making: Pulmonary edema identified. Radiologist also suggest increased size of the heart border and are recommending an echo to evaluate for pericardial effusion. She was given IV Lasix here and is being admitted. Findings are discussed with the patient and her son. CONSULTS:  IP CONSULT TO HOSPITALIST    PROCEDURES:  None    FINAL IMPRESSION      1. Acute pulmonary edema (HCC)    2. Fatigue, unspecified type          DISPOSITION/PLAN   DISPOSITION Decision To Admit 04/13/2022 01:37:22 PM      PATIENT REFERRED TO:   No follow-up provider specified. DISCHARGE MEDICATIONS:     New Prescriptions    No medications on file       The care is provided during an unprecedented national emergency due to the novel coronavirus, COVID-19.     (Please note that portions of this note were completed with a voice recognition program.  Efforts were made to edit the dictations but occasionally words are mis-transcribed.)    Tara Borden MD  Attending Emergency Physician            Tara Borden MD  04/13/22 6615

## 2022-04-13 NOTE — PROGRESS NOTES
Pt admitted to room 2010 per stretcher in stable condition from ER  Oriented to room and surroundings  Bed in lowest position, wheels locked, 2/4 side rails up  Call light in reach, room free of clutter, adequate lighting provided  Denies any further questions at this time  Instructed to call out with any questions/concerns/new onset of pain and/or n/v   White board updated  Continue to monitor with hourly rounding  STAY WITH ME protocol initiated   Bed alarm on/Fall Risk signs in place/Fall risk sticker to wrist band  Non-skid socks on/at bedside

## 2022-04-14 VITALS
HEART RATE: 59 BPM | SYSTOLIC BLOOD PRESSURE: 135 MMHG | TEMPERATURE: 98.2 F | BODY MASS INDEX: 32.96 KG/M2 | DIASTOLIC BLOOD PRESSURE: 58 MMHG | HEIGHT: 63 IN | RESPIRATION RATE: 18 BRPM | WEIGHT: 186 LBS | OXYGEN SATURATION: 93 %

## 2022-04-14 PROBLEM — I50.1 PULMONARY EDEMA CARDIAC CAUSE (HCC): Status: ACTIVE | Noted: 2022-04-14

## 2022-04-14 LAB
ANION GAP SERPL CALCULATED.3IONS-SCNC: 10 MMOL/L (ref 9–17)
BUN BLDV-MCNC: 20 MG/DL (ref 8–23)
BUN/CREAT BLD: 22 (ref 9–20)
CALCIUM SERPL-MCNC: 9.4 MG/DL (ref 8.6–10.4)
CHLORIDE BLD-SCNC: 96 MMOL/L (ref 98–107)
CO2: 26 MMOL/L (ref 20–31)
CREAT SERPL-MCNC: 0.92 MG/DL (ref 0.5–0.9)
GFR AFRICAN AMERICAN: >60 ML/MIN
GFR NON-AFRICAN AMERICAN: 57 ML/MIN
GFR SERPL CREATININE-BSD FRML MDRD: ABNORMAL ML/MIN/{1.73_M2}
GLUCOSE BLD-MCNC: 110 MG/DL (ref 70–99)
INR BLD: 1.8
MAGNESIUM: 1.3 MG/DL (ref 1.6–2.6)
POTASSIUM SERPL-SCNC: 3.8 MMOL/L (ref 3.7–5.3)
PROTHROMBIN TIME: 21.1 SEC (ref 11.5–14.2)
SODIUM BLD-SCNC: 132 MMOL/L (ref 135–144)
VITAMIN D 25-HYDROXY: 37.6 NG/ML

## 2022-04-14 PROCEDURE — 82306 VITAMIN D 25 HYDROXY: CPT

## 2022-04-14 PROCEDURE — G0378 HOSPITAL OBSERVATION PER HR: HCPCS

## 2022-04-14 PROCEDURE — 96365 THER/PROPH/DIAG IV INF INIT: CPT

## 2022-04-14 PROCEDURE — 6370000000 HC RX 637 (ALT 250 FOR IP): Performed by: STUDENT IN AN ORGANIZED HEALTH CARE EDUCATION/TRAINING PROGRAM

## 2022-04-14 PROCEDURE — 2580000003 HC RX 258: Performed by: NURSE PRACTITIONER

## 2022-04-14 PROCEDURE — 97116 GAIT TRAINING THERAPY: CPT

## 2022-04-14 PROCEDURE — 6360000002 HC RX W HCPCS: Performed by: NURSE PRACTITIONER

## 2022-04-14 PROCEDURE — 80048 BASIC METABOLIC PNL TOTAL CA: CPT

## 2022-04-14 PROCEDURE — 85610 PROTHROMBIN TIME: CPT

## 2022-04-14 PROCEDURE — 83735 ASSAY OF MAGNESIUM: CPT

## 2022-04-14 PROCEDURE — 96366 THER/PROPH/DIAG IV INF ADDON: CPT

## 2022-04-14 PROCEDURE — 97162 PT EVAL MOD COMPLEX 30 MIN: CPT

## 2022-04-14 PROCEDURE — 97530 THERAPEUTIC ACTIVITIES: CPT

## 2022-04-14 PROCEDURE — 97535 SELF CARE MNGMENT TRAINING: CPT

## 2022-04-14 PROCEDURE — 6370000000 HC RX 637 (ALT 250 FOR IP): Performed by: NURSE PRACTITIONER

## 2022-04-14 PROCEDURE — 97167 OT EVAL HIGH COMPLEX 60 MIN: CPT

## 2022-04-14 PROCEDURE — 97112 NEUROMUSCULAR REEDUCATION: CPT

## 2022-04-14 PROCEDURE — 6360000002 HC RX W HCPCS: Performed by: STUDENT IN AN ORGANIZED HEALTH CARE EDUCATION/TRAINING PROGRAM

## 2022-04-14 PROCEDURE — 99217 PR OBSERVATION CARE DISCHARGE MANAGEMENT: CPT | Performed by: STUDENT IN AN ORGANIZED HEALTH CARE EDUCATION/TRAINING PROGRAM

## 2022-04-14 PROCEDURE — 36415 COLL VENOUS BLD VENIPUNCTURE: CPT

## 2022-04-14 RX ORDER — POTASSIUM CHLORIDE 20 MEQ/1
20 TABLET, EXTENDED RELEASE ORAL
Status: DISCONTINUED | OUTPATIENT
Start: 2022-04-15 | End: 2022-04-14 | Stop reason: HOSPADM

## 2022-04-14 RX ORDER — FUROSEMIDE 20 MG/1
20 TABLET ORAL DAILY
Status: DISCONTINUED | OUTPATIENT
Start: 2022-04-14 | End: 2022-04-14 | Stop reason: HOSPADM

## 2022-04-14 RX ORDER — FUROSEMIDE 20 MG/1
20 TABLET ORAL DAILY
Qty: 60 TABLET | Refills: 3 | Status: ON HOLD | OUTPATIENT
Start: 2022-04-14 | End: 2022-04-20 | Stop reason: SDUPTHER

## 2022-04-14 RX ORDER — POTASSIUM CHLORIDE 20 MEQ/1
20 TABLET, EXTENDED RELEASE ORAL
Qty: 60 TABLET | Refills: 3 | Status: SHIPPED
Start: 2022-04-15 | End: 2022-04-14 | Stop reason: HOSPADM

## 2022-04-14 RX ORDER — MAGNESIUM SULFATE IN WATER 40 MG/ML
2000 INJECTION, SOLUTION INTRAVENOUS ONCE
Status: COMPLETED | OUTPATIENT
Start: 2022-04-14 | End: 2022-04-14

## 2022-04-14 RX ADMIN — FUROSEMIDE 20 MG: 20 TABLET ORAL at 13:00

## 2022-04-14 RX ADMIN — SODIUM CHLORIDE, PRESERVATIVE FREE 10 ML: 5 INJECTION INTRAVENOUS at 10:09

## 2022-04-14 RX ADMIN — PANTOPRAZOLE SODIUM 40 MG: 40 TABLET, DELAYED RELEASE ORAL at 05:18

## 2022-04-14 RX ADMIN — METOPROLOL SUCCINATE 25 MG: 25 TABLET, EXTENDED RELEASE ORAL at 10:09

## 2022-04-14 RX ADMIN — ATORVASTATIN CALCIUM 40 MG: 40 TABLET, FILM COATED ORAL at 10:09

## 2022-04-14 RX ADMIN — CITALOPRAM HYDROBROMIDE 20 MG: 20 TABLET ORAL at 10:09

## 2022-04-14 RX ADMIN — MULTIPLE VITAMINS W/ MINERALS TAB 1 TABLET: TAB at 10:10

## 2022-04-14 RX ADMIN — LISINOPRIL 10 MG: 10 TABLET ORAL at 10:09

## 2022-04-14 RX ADMIN — MAGNESIUM SULFATE HEPTAHYDRATE 2000 MG: 40 INJECTION, SOLUTION INTRAVENOUS at 10:20

## 2022-04-14 ASSESSMENT — PAIN SCALES - GENERAL: PAINLEVEL_OUTOF10: 0

## 2022-04-14 NOTE — CARE COORDINATION
Case Management Initial Discharge Plan  Sushila León,         Readmission Risk              Risk of Unplanned Readmission:  13             Met with:patient to discuss discharge plans. Information verified: address, contacts, phone number, , insurance Yes  PCP: TASHA Saldana CNP  Date of last visit: few months ago     Insurance Provider: [address]     Discharge Planning  Current Residence:  Mobile home   Living Arrangements:  Children (Lives with son) chris Maloney has 1 stories/5 stairs to climb  Support Systems:  Children       Current Services PTA:  None  Agency: none      Patient able to perform ADL's:Assisted  DME in home:  Walker, cane. W/c, bath bench   DME used to aid ambulation prior to admission:   Enrique Sample   DME used during admission:  [address] Needed:  [address] [facility]: [place] on [address] Medications:  No  Does patient want to participate in local refill/ meds to beds program?       Patient agreeable to home care: No  [place] of choice provided:  no      Type of Home Care Services:  None  Patient expects to be discharged to:    home     Prior SNF/Rehab Placement and Facility: [facility]   Agreeable to SNF/Rehab: No  [place] of choice provided: n/a   Evaluation: n/a    Expected Discharge date:  22  Follow Up Appointment: Best Day/ Time: Wednesday AM    Transportation provider: per sons   Transportation arrangements needed for discharge: No    Discharge Plan:   Met with patient to complete discharge assessment. Patient lives in mobile home with son Mauricio Linares. She is independent with her walker and son assists as needed. [facility] and is declining any home care, snf or outpatient therapy    She sees dr Nieves Heart for her cardiologist and is on coumadin.  Her son Andreia López drives her to most of her appointments but there are a total of 9 children who can assist.     Did call joey to update on therapy recommendations and they are all in agreement that patient can come home and has plenty of family support. Declining any needs at this time will follow.      Electronically signed by Jamilah Gómez RN on 4/14/22 at 11:01 AM EDT

## 2022-04-14 NOTE — PROGRESS NOTES
replacement; Tonsillectomy; Dental surgery; back surgery; Upper gastrointestinal endoscopy; Colon surgery; Carotid endarterectomy (Right); Upper gastrointestinal endoscopy (12/18/2017); hernia repair (06/18/2013); pr egd transoral biopsy single/multiple (N/A, 12/18/2017); Upper gastrointestinal endoscopy (N/A, 4/12/2018); Upper gastrointestinal endoscopy (04/12/2018); Dental surgery (05/21/2018); and Aortic valve repair. Per H&P: Pt presented to ED on 4/13/22 for fatigue.  It began that morning.  Yesterday she had run some errands with her son and was fine.  She woke up today and she is fatigued.  She cannot explain it anymore other than she just does not feel good.  She does not seem to complain of any pain and has not had a fever or cough or vomiting or diarrhea.  No dysuria.  Son states that he knew he would not be able to get her into the PCPs office so he brought her here to get checked.  No syncope.     Pt admitted to the hospital for the management of Acute on chronic diastolic CHF (congestive heart failure)      Restrictions  Restrictions/Precautions  Restrictions/Precautions: General Precautions,Fall Risk  Implants present? : Pacemaker  Position Activity Restriction  Other position/activity restrictions:  Up with assist, telemetry, contact isol 2* ESBL, heels off bed at all times, ext catheter, RUE IV, low sodium diet, ALARMS    Subjective   General  Chart Reviewed: Yes  Patient assessed for rehabilitation services?: Yes  Family / Caregiver Present: No    Social/Functional History  Social/Functional History  Lives With: Son  Type of Home: House  Home Layout: One level  Home Access: Stairs to enter with rails  Entrance Stairs - Number of Steps: 5  Entrance Stairs - Rails: Both  Bathroom Shower/Tub: Tub/Shower unit (WALK IN TUB per chart record)  Bathroom Toilet: Standard  Bathroom Equipment: Built-in shower seat,Grab bars in shower,Grab bars around toilet  Home Equipment: 4 wheeled walker,Cane,Wheelchair-manual  Receives Help From: Family (Son is retired, can be home all the time w/ pt)  ADL Assistance: Independent  Homemaking Assistance: Needs assistance (Son or girlfriend completes I ADL's, She does lt housekeeping & some laundry)  Ambulation Assistance: Independent (uses 1TT)  Transfer Assistance: Independent  Active : No  Patient's  Info: Son  Occupation: Retired  Type of occupation: [employer]  Leisure & Hobbies: puzzles, read  IADL Comments: Question reliability of info pt reported as she is a poor historian, will need verified for accuracy  Additional Comments: Pt denies any falls past 3 months then Keiry Casey stated hurt R shoulder after fall when leg gave out but was probably a year ago       Objective   Vision: Within Functional Limits (Pt says she can read w/o glasses)  Hearing: Exceptions to [facility]  Hearing Exceptions: Hard of hearing/hearing concerns;Bilateral hearing aid (says she really does not werar aids, can hear w/o them)    Orientation  Overall Orientation Status: Within Functional Limits  Observation/Palpation  Posture: Good  Observation: Pt resting semifowlers position in bed, appears comfortable  Edema: BLE's  Balance  Sitting Balance: Stand by assistance  Standing Balance: Moderate assistance (x2)  Standing Balance  Time: ~6-7 min  Activity: functional mob, toileting, grooming  Functional Mobility  Functional - Mobility Device: Rolling Walker  Activity: To/from bathroom  Assist Level:  Moderate assistance (x2)  Functional Mobility Comments: Pt required MAX VC/tactile cues for RW safety/mgmt, RW navigation, upright posture, pacing, assist with lines, all to inc safety/reduce fall risk  ADL  Feeding: Modified independent   Grooming: Contact guard assistance (Pt washed her hands and completed oral care while standing at the sink with CGA)  UE Bathing: Stand by assistance  LE Bathing: Minimal assistance  UE Dressing: Stand by assistance  LE Dressing: Minimal assistance (Pt required increased time and effort to doff/cherry B socks while sitting EOB with CGA)  Toileting: Minimal assistance (Min A for clothing mgmt)  Tone RUE  RUE Tone: Normotonic  Tone LUE  LUE Tone: Normotonic  Coordination  Movements Are Fluid And Coordinated: No  Coordination and Movement description: Fine motor impairments;Decreased speed;Decreased accuracy; Right UE;Left UE     Bed mobility  Supine to Sit: Contact guard assistance  Scooting: Contact guard assistance  Comment: Pt required Min VCs/tactile cues for proper log roll tech and use of bed rails  Transfers  Sit to stand: Minimal assistance;2 Person assistance  Stand to sit: Minimal assistance;2 Person assistance  Transfer Comments: Pt required MAX VC/tactile cues for proper hand placement on stable surface, controlled sit<>stand, upright posture, RW safety/mgmt, assist with lines, all to inc safety/reduce fall risk     Cognition  Overall Cognitive Status: Exceptions  Arousal/Alertness: Appropriate responses to stimuli  Following Commands: Follows one step commands with increased time; Follows one step commands with repetition  Attention Span: Appears intact  Memory: Decreased short term memory  Safety Judgement: Decreased awareness of need for assistance;Decreased awareness of need for safety  Problem Solving: Decreased awareness of errors;Assistance required to identify errors made;Assistance required to correct errors made;Assistance required to implement solutions  Insights: Decreased awareness of deficits  Initiation: Does not require cues  Sequencing: Requires cues for some        Sensation  Overall Sensation Status: WFL (pt denies any paresthesias in hands/feet)        LUE AROM (degrees)  LUE AROM : WFL  Left Hand AROM (degrees)  Left Hand AROM: WFL  RUE AROM (degrees)  RUE AROM : WFL  Right Hand AROM (degrees)  Right Hand AROM: WFL  LUE Strength  LUE Strength Comment: ~4/5  RUE Strength  RUE Strength Comment: ~4/5       Pt instructed to \"move what moves\", such as moving all joints in any comfortable direction, within ROM and pain tolerance. OT provided demonstration of AROM of neck, shoulders, elbows, forearms, wrists, fingers, knees, and ankles. OT recommended while pt is watching TV to use commercials as a guide to initiate AROM of one joint. Pt with Good understanding of importance of AROM to promote circulation, maintain strength/endurance and to prevent overall stiffness. Pt with Good demo of education. Plan   Plan  Times per week: 4-5x per week  Times per day: Daily  Current Treatment Recommendations: Strengthening,Endurance Training,Patient/Caregiver Education & Training,Self-Care / ADL,Equipment Evaluation, Education, & procurement,Balance Training,Home Management Training,Positioning,Safety Education & Training,Functional Mobility Training      Goals  Short term goals  Time Frame for Short term goals: by discharge, pt to demo  Short term goal 1: I/MI for bed mob tasks without bed rails  Short term goal 2: I/MI for ADL transfers and functional mob with AD as needed and Good safety  Short term goal 3: I/MI for total body ADLs with AE as needed and Good safety  Short term goal 4: pt/family to be IND with EC/WS, fall prevention tech, CHF education, as well as DME/AE recommendations with use of handouts as needed  Patient Goals   Patient goals : to go home       Therapy Time   Individual Concurrent Group Co-treatment   Time In 0920         Time Out 1009 (+10 chart review)         Minutes 59          tx time: 49 min       Upon writer exit, call light within reach, pt retired to chair. All lines intact and patient positioned comfortably. All patient needs addressed prior to ending therapy session. Chart reviewed prior to treatment and patient is agreeable for therapy. RN reports patient is medically stable for therapy treatment this date.     Co-treatment with PT warranted secondary to decreased safety and independence requiring 2 skilled therapy professionals to address individual discipline's goals. OT addressing preparation for ADL transfer, sitting balance for increased ADL performance, sitting/activity tolerance, functional reaching, environmental safety/scanning, fall prevention, functional mobility for ADL transfers, ability to sequence and follow directions and functional UE strength.     Natali Son OTR/L

## 2022-04-14 NOTE — DISCHARGE SUMMARY
[facility]  Office: [address], DO, Sarah Carranza, DO, Iliana Campbell, DO, Eve Carrasco, DO, Olena Smith MD, Rose Marie Capone MD, Shauna Azar MD, Suleman Contreras MD, Ron Harris MD, Vaishnavi Thompson MD, Charissa Agarwal MD, Betsy Gorman, DO, Edgar Hollis, DO, Haja Serra MD,  Rose Garcia DO, Joshua Alfaro MD, Argenis Ferreira MD, Agatha Navarro MD, Garcia Sanchez DO, Maggy Up MD, Candelaria Preciado MD, Michelle Alcocer, [facility], [facility] Jayson, CNP, Fauzia Stein, CNP, Malvin Ott, CNP, Yohan Pan, [facility], Haroldo Castaneda, CNP, Harris Berkowitz PA-C, Yadi Cyr, [facility], Fantasma Marroquin, CNP, Jacklyn Frankel, CNP, Bita Caballero, [facility], Eda Ryan, [facility], Hugo Phoenix, [facility], Jones Easton, CNP, Caryn Multani, CNP, Omar Fay, [facility]         [facility]    Discharge Summary     Patient ID: Laxmi aMrcum  :  1931   MRN: [id number]     ACCOUNT:  [de-identified]   Patient's PCP: TASHA Mazariegos CNP  Admit Date: 2022   Discharge Date: 2022     Length of Stay: 0  Code Status:  Full Code  Admitting Physician: Argenis Ferreira MD  Discharge Physician: Argenis Ferreira MD     Active Discharge Diagnoses:     Hospital Problem Lists:  Principal Problem:    Acute on chronic diastolic CHF (congestive heart failure) ([facility] [address]  Active Problems:    Hypertension    Pacemaker    CAD (coronary artery disease)    Anticoagulated    Pulmonary edema cardiac cause ([facility] [address]  Resolved Problems:    * No resolved hospital problems. *      Admission Condition:  fair     Discharged Condition: fair    Hospital Stay:     Hospital Course:  Laxmi Marcum is a 80 y.o. female who was admitted for the management of  Acute on chronic diastolic CHF (congestive heart failure) ([facility] [address] , presented to ER with Fatigue (Woke up today feeling \"tired\"; denies any other complaints )    70-year-old female presents to the hospital with fatigue. Patient was noted to have pedal edema, elevated BNP, was admitted for diastolic heart failure. There was concern for possible pericardial effusion on the x-ray. Echo was done. There was no evidence of pericardial effusion on echo. Patient received diuresis and her breathing improved. Patient was discharged home on Lasix. Patient electrolytes were replaced including magnesium.     Patient continued to feel weak and tired but had no other complaints. Vitamin D was ordered, results not back at the time of discharge. Patient refused home care. She has help at home, confirmed with family.     Echo  Summary  Left ventricle is normal in size. Mild left ventricular hypertrophy. Global left ventricular systolic function is normal with an estimated  ejection fraction of 60 % . Grade II (moderate) left ventricular diastolic dysfunction. Left atrium is mildly dilated. Right atrium is mildly dilated . TAVR with 23mm Kana 3 valve with normal velocities and moderate  perivalvular leak. Thickened mitral valve leaflets. Moderate mitral regurgitation. Moderate tricuspid regurgitation. Moderate pulmonary hypertension. Estimated right ventricular systolic  pressure is 45YOYO. No significant pericardial effusion is seen.       Significant therapeutic interventions: as above    Significant Diagnostic Studies:   Labs / Micro:  CBC:   Lab Results   Component Value Date    WBC 7.4 04/13/2022    RBC 4.12 04/13/2022    HGB 11.2 04/13/2022    HCT 36.8 04/13/2022    MCV 89.3 04/13/2022    MCH 27.2 04/13/2022    MCHC 30.4 04/13/2022    RDW 13.8 04/13/2022     04/13/2022     BMP:    Lab Results   Component Value Date    GLUCOSE 110 04/14/2022     04/14/2022    K 3.8 04/14/2022    CL 96 04/14/2022    CO2 26 04/14/2022    ANIONGAP 10 04/14/2022    BUN 20 04/14/2022    CREATININE 0.92 04/14/2022    BUNCRER 22 04/14/2022    CALCIUM 9.4 04/14/2022    LABGLOM 57 04/14/2022    GFRAA >60 04/14/2022    GFR      04/14/2022 HFP:    Lab Results   Component Value Date    PROT 6.6 09/14/2019     CMP:    Lab Results   Component Value Date    GLUCOSE 110 04/14/2022     04/14/2022    K 3.8 04/14/2022    CL 96 04/14/2022    CO2 26 04/14/2022    BUN 20 04/14/2022    CREATININE 0.92 04/14/2022    ANIONGAP 10 04/14/2022    ALKPHOS 113 09/14/2019    ALT 11 09/14/2019    AST 21 09/14/2019    BILITOT 0.56 09/14/2019    LABALBU 3.8 09/14/2019    ALBUMIN NOT REPORTED 09/14/2019    LABGLOM 57 04/14/2022    GFRAA >60 04/14/2022    GFR      04/14/2022    PROT 6.6 09/14/2019    CALCIUM 9.4 04/14/2022     PT/INR:    Lab Results   Component Value Date    PROTIME 21.1 04/14/2022    PROTIME 20.6 04/11/2022    INR 1.8 04/14/2022        Radiology:  XR CHEST PORTABLE    Result Date: 4/13/2022  Interstitial and mild alveolar pulmonary edema. Clinical correlation is required to exclude superimposed infection. Cardiomegaly which is markedly increased relative to prior comparison exams on a background of prior TAVR. Correlate for possible pericardial effusion. Suspected small bilateral pleural effusions. Consultations:    Consults:     Final Specialist Recommendations/Findings:   IP CONSULT TO HOSPITALIST  PHARMACY TO DOSE WARFARIN  IP CONSULT TO HEART FAILURE NURSE/COORDINATOR  IP CONSULT TO DIETITIAN      The patient was seen and examined on day of discharge and this discharge summary is in conjunction with any daily progress note from day of discharge.     Discharge plan:     Disposition: Home    Physician Follow Up:     TASHA Jeff CNP  [address] [address] [phone]    Schedule an appointment as soon as possible for a visit in 1 week         Requiring Further Evaluation/Follow Up POST HOSPITALIZATION/Incidental Findings: follow up bmp in 3-4 days    Diet: regular diet    Activity: As tolerated    Instructions to Patient: lasix daily    Discharge Medications:      Medication List      START taking these medications    furosemide 20 MG tablet  Commonly known as: LASIX  Take 1 tablet by mouth daily        CHANGE how you take these medications    citalopram 20 MG tablet  Commonly known as: CELEXA  TAKE ONE TABLET BY MOUTH DAILY  What changed: See the new instructions. lisinopril 10 MG tablet  Commonly known as: PRINIVIL;ZESTRIL  TAKE ONE TABLET BY MOUTH DAILY  What changed:   · how much to take  · how to take this  · when to take this     metoprolol succinate 25 MG extended release tablet  Commonly known as: TOPROL XL  TAKE ONE TABLET BY MOUTH DAILY  What changed: See the new instructions. pantoprazole 40 MG tablet  Commonly known as: PROTONIX  TAKE ONE TABLET BY MOUTH EVERY MORNING BEFORE BREAKFAST  What changed: See the new instructions. CONTINUE taking these medications    atorvastatin 40 MG tablet  Commonly known as: LIPITOR  Take 1 tablet by mouth daily     CALTRATE 600+D PO     Centrum Silver Adult 50+ Tabs     Tylenol 8 Hour Arthritis Pain 650 MG extended release tablet  Generic drug: acetaminophen     * warfarin 2 MG tablet  Commonly known as: COUMADIN     * warfarin 2 MG tablet  Commonly known as: COUMADIN         * This list has 2 medication(s) that are the same as other medications prescribed for you. Read the directions carefully, and ask your doctor or other care provider to review them with you. STOP taking these medications    clopidogrel 75 MG tablet  Commonly known as: PLAVIX           Where to Get Your Medications      These medications were sent to [facility] Brianne Diaz [address] [place] [address],  Sallie Zimmerman     Phone: [phone]   · furosemide 20 MG tablet         Discharge Procedure Orders   Basic Metabolic Panel   Standing Status: Future Standing Exp. Date: 04/14/23   Order Comments: Follow up pcp     Magnesium   Standing Status: Future Standing Exp. Date: 04/14/23   Order Comments:  Follow up pcp       Time Spent on discharge is  36 mins in patient examination, evaluation, counseling as well as medication reconciliation, prescriptions for required medications, discharge plan and follow up. Electronically signed by   Gustavo Bolaños MD  4/14/2022  12:51 PM      Thank you TASHA Pickard - [facility] for the opportunity to be involved in this patient's care.

## 2022-04-14 NOTE — PROGRESS NOTES
Warfarin Dosing - Pharmacy Consult Note  Consulting Provider: Dr. Jean Pierre Styles  Indication:  Atrial Fibrillation  Warfarin Dose prior to admission: 6mg mwf, 4mg aod   Concurrent anticoagulants/antiplatelets: N/A  Significant Drug Interactions: No obvious interactions  Recent Labs     04/13/22  1135 04/14/22  0511   INR 1.7 1.8   HGB 11.2*  --      --      Recent warfarin administrations                     warfarin (COUMADIN) tablet 4 mg (mg) 4 mg Given 04/13/22 1750                   Date   INR    Dose  4/13/22   1.7     4 mg    Assessment/Plan  (Goal INR: 2 - 3)  Will resume patient's home warfarin regimen of 6 mg Mon/Fri and 4 mg all other days. 4mg scheduled for today. Active problem list reviewed. INR orders are placed. Chart reviewed for pertinent labs, drug/diet interactions, and past doses. Documentation of patient's clinical condition was reviewed. Pharmacy Dosing:  Pharmacy will continue to follow.

## 2022-04-14 NOTE — PROGRESS NOTES
.All patient belongings collected. IV and telemetry removed. Discharge paperwork given and explained to patient and patients son over the phone. Lab work paper provided to patient. Patient discharging home with no home care needs.

## 2022-04-14 NOTE — PROGRESS NOTES
[facility]  Office: [address], DO, Nicolasa Staff, DO, Danette Taylor, DO, Braxton Palacios Blood, DO, Tamara Lin MD, Charles Do MD, Jimmy Baker MD, Milton Woodson MD, Hayden Lino MD, Kwame Foreman MD, Skye Cabral MD, Elise Lancaster, DO, Brody Treadwell, DO, Rosalina Balbuena MD,  Teagan Hay, DO, Slava Aguilar MD, Jim Zapata MD, Hope Opitz, MD, Simona Gonzales, DO, Surya Han MD, Moncho Valentine MD, Joe Still, [facility], [facility], CNP, Jamin Jones, CNP, Olivia Coronel, CNP, Donna Mccabe, CNP, Nena Bright, CNP, LAZARO MackC, Julieta Valenzuela, [facility], Addy Lopez, CNP, Chloe Bejarano, CNP, Linda Leyva, CNP, Maya Montero, CNS, Matty Broderick, [facility], Yoli Herron, CNP, Jamaica Mendez, CNP, Royal Fam, [facility]    Progress Note    4/14/2022    12:44 PM    Name:   Saroj Vickers  MRN:     [id number]     Acct:      [de-identified]   Room:   [address] [place] [address] Day:  0  Admit Date:  4/13/2022 10:27 AM    PCP:   TASHA Jeff CNP  Code Status:  Full Code    Subjective:     C/C:   Chief Complaint   Patient presents with    Fatigue     Woke up today feeling \"tired\"; denies any other complaints      Interval History Status: improved. Patient continues to feel tired. Shortness of breath is improved  Leg swelling has improved  No nausea, appetite improving    Brief History:     70-year-old female presents to the hospital with fatigue. Patient was noted to have pedal edema, elevated BNP, was admitted for diastolic heart failure. There was concern for possible pericardial effusion on the x-ray. Echo was done. There was no evidence of pericardial effusion on echo. Patient received diuresis and her breathing improved. Patient was discharged home on Lasix. Patient electrolytes were replaced including magnesium.     Patient continued to feel weak and tired but had no other complaints. Vitamin D was ordered, results not back at the time of discharge. Patient refused home care. She has help at home, confirmed with family. Echo  Summary  Left ventricle is normal in size. Mild left ventricular hypertrophy. Global left ventricular systolic function is normal with an estimated  ejection fraction of 60 % . Grade II (moderate) left ventricular diastolic dysfunction. Left atrium is mildly dilated. Right atrium is mildly dilated . TAVR with 23mm Kana 3 valve with normal velocities and moderate  perivalvular leak. Thickened mitral valve leaflets. Moderate mitral regurgitation. Moderate tricuspid regurgitation. Moderate pulmonary hypertension. Estimated right ventricular systolic  pressure is [id number]. No significant pericardial effusion is seen. Review of Systems:     Constitutional:  negative for chills, fevers, sweats  Respiratory:  negative for cough, dyspnea on exertion, shortness of breath, wheezing  Cardiovascular:  negative for chest pain, chest pressure/discomfort, lower extremity edema, palpitations  Gastrointestinal:  negative for abdominal pain, constipation, diarrhea, nausea, vomiting  Neurological:  negative for dizziness, headache    Medications:      Allergies:  No Known Allergies    Current Meds:   Scheduled Meds:    furosemide  20 mg Oral Daily    [START ON 4/15/2022] potassium chloride  20 mEq Oral Daily with breakfast    atorvastatin  40 mg Oral Daily    citalopram  20 mg Oral Daily    lisinopril  10 mg Oral Daily    metoprolol succinate  25 mg Oral Daily    therapeutic multivitamin-minerals  1 tablet Oral Daily    pantoprazole  40 mg Oral QAM AC    sodium chloride flush  5-40 mL IntraVENous 2 times per day    [START ON 4/15/2022] warfarin  6 mg Oral Once per day on Mon Fri    warfarin  4 mg Oral Once per day on Sun Tue Wed Thu Sat    warfarin placeholder: dosing by pharmacy   Other RX Placeholder     Continuous Infusions:    sodium chloride PRN Meds: sodium chloride flush, sodium chloride, ondansetron **OR** ondansetron, polyethylene glycol, acetaminophen **OR** acetaminophen    Data:     Past Medical History:   has a past medical history of Antral ulcer, acute, Arthritis, CAD (coronary artery disease), GERD (gastroesophageal reflux disease), Hyperlipidemia, and Hypertension. Social History:   reports that she has never smoked. She has never used smokeless tobacco. She reports that she does not drink alcohol and does not use drugs. Family History:   Family History   Problem Relation Age of Onset    Heart Disease Mother     Heart Disease Sister     Heart Disease Brother        Vitals:  BP (!) 135/58   Pulse 59   Temp 98.2 °F (36.8 °C) (Oral)   Resp 18   Ht 5' 3\" (1.6 m)   Wt 186 lb (84.4 kg)   SpO2 93%   BMI 32.95 kg/m²   Temp (24hrs), Av.5 °F (36.9 °C), Min:98.1 °F (36.7 °C), Max:99 °F (37.2 °C)    No results for input(s): POCGLU in the last 72 hours. I/O (24Hr):     Intake/Output Summary (Last 24 hours) at 2022 1244  Last data filed at 2022 1114  Gross per 24 hour   Intake 250 ml   Output 1400 ml   Net -1150 ml       Labs:  Hematology:  Recent Labs     22  1135 22  0511   WBC 7.4  --    RBC 4.12  --    HGB 11.2*  --    HCT 36.8  --    MCV 89.3  --    MCH 27.2  --    MCHC 30.4  --    RDW 13.8  --      --    MPV 9.9  --    INR 1.7 1.8     Chemistry:  Recent Labs     22  1135 22  0511    132*   K 4.7 3.8    96*   CO2 24 26   GLUCOSE 105* 110*   BUN 20 20   CREATININE 0.80 0.92*   MG  --  1.3*   ANIONGAP 11 10   LABGLOM >60 57*   GFRAA >60 >60   CALCIUM 9.2 9.4   PROBNP 3,762*  --    TROPHS 24*  --    MYOGLOBIN 44  --    No results for input(s): PROT, LABALBU, LABA1C, V4RYZNH, E1XQHIN, FT4, TSH, AST, ALT, LDH, GGT, ALKPHOS, LABGGT, BILITOT, BILIDIR, AMMONIA, AMYLASE, LIPASE, LACTATE, CHOL, HDL, LDLCHOLESTEROL, CHOLHDLRATIO, TRIG, VLDL, AGN71KL, PHENYTOIN, PHENYF, URICACID, POCGLU in the last 72 hours. ABG:No results found for: POCPH, PHART, PH, POCPCO2, UTX8QXA, PCO2, POCPO2, PO2ART, PO2, POCHCO3, YSE9ETM, HCO3, NBEA, PBEA, BEART, BE, THGBART, THB, IAH4VTU, QSRM5LUD, H4MHMGWJ, O2SAT, FIO2  Lab Results   Component Value Date/Time    SPECIAL NOT REPORTED 09/16/2019 04:35 PM     Lab Results   Component Value Date/Time    CULTURE ENTEROBACTER CLOACAE 50 to 100,000 CFU/ML (A) 09/16/2019 04:35 PM    CULTURE (A) 09/16/2019 04:35 PM     ESCHERICHIA COLI 10 to 50,000 CFU/ML THIS ORGANISM IS AN EXTENDED-SPECTRUM BETA-LACTAMASE  AND RESISTANCE TO THERAPY WITH PENICILLINS, CEPHALOSPORINS AND AZTREONAM IS EXPECTED. THESE ORGANISMS GENERALLY REMAIN SUSCEPTIBLE TO CARBAPENEMS. CONSIDER ID CONSULTATION. Radiology:  XR CHEST PORTABLE    Result Date: 4/13/2022  Interstitial and mild alveolar pulmonary edema. Clinical correlation is required to exclude superimposed infection. Cardiomegaly which is markedly increased relative to prior comparison exams on a background of prior TAVR. Correlate for possible pericardial effusion. Suspected small bilateral pleural effusions.        Physical Examination:        General appearance:  alert, cooperative and no distress  Mental Status:  oriented to person, place and time and normal affect  Lungs:  clear to auscultation bilaterally, normal effort  Heart:  regular rate and rhythm, no murmur  Abdomen:  soft, nontender, nondistended, normal bowel sounds, no masses, hepatomegaly, splenomegaly  Extremities:  no edema, redness, tenderness in the calves  Skin:  no gross lesions, rashes, induration    Assessment:        Hospital Problems           Last Modified POA    * (Principal) Acute on chronic diastolic CHF (congestive heart failure) ([facility] [address] 4/13/2022 Yes    Hypertension 4/13/2022 Yes    Pacemaker 4/13/2022 Yes    CAD (coronary artery disease) (Chronic) 4/13/2022 Yes    Anticoagulated 4/13/2022 Yes    Pulmonary edema cardiac cause ([facility] [address] 4/14/2022 Yes Plan:        Acute on chronic diastolic heart failure-continue Lasix 20 mg daily, continue home lisinopril. Repeat echo does not show any pericardial effusion. Patient is saturating well on room air. Plan to discharge today on Lasix. Will repeat BMP and magnesium outpatient to monitor electrolytes. Hypertension-controlled on lisinopril and beta-blocker    A. fib-continue home Coumadin and amiodarone    Continue Lipitor  Continue home Protonix    Patient is comfortable for discharge today home.     Dariela Alvarez MD  4/14/2022  12:44 PM

## 2022-04-14 NOTE — PROGRESS NOTES
Physical Therapy    Facility/Department: [facility]  Initial Assessment    NAME: Yane Milner  : 1931  MRN: [id number]    Date of Service: 2022    Discharge Recommendations:  Patient would benefit from continued therapy after discharge       Pt presented to ED on 22 for fatigue. It began that morning. Yesterday she had run some errands with her son and was fine. She woke up today and she is fatigued. She cannot explain it anymore other than she just does not feel good. She does not seem to complain of any pain and has not had a fever or cough or vomiting or diarrhea. No dysuria. Son states that he knew he would not be able to get her into the PCPs office so he brought her here to get checked. No syncope. Pt admitted to the hospital for the management of Acute on chronic diastolic CHF (congestive heart failure)    RN reports patient is medically stable for therapy treatment this date. Chart reviewed prior to treatment and patient is agreeable for therapy. Assessment   Body structures, Functions, Activity limitations: Decreased functional mobility ; Decreased ADL status; Decreased strength;Decreased safe awareness;Decreased posture;Decreased endurance;Decreased balance  Assessment: Pt with deficits of bed mobility, transfers, ambulation, balance, safety awareness and endurance this session,  & required 2 assist for safe transfers & gait. , & is decline compared to prior level of function. With current deficits, Pt requires continued PT to maximize independence with functional mobility, balance, safety awareness & activity tolerance to improve overall tolerance of I ADL's. Pt currently functioning below baseline. Would suggest additional therapy at time of discharge to maximize long term outcomes and prevent re-admission. Please refer to AM-PAC score for current level of function.   Decision Making: Medium Complexity  Exam: ROM, MMT, functional mobility, activity tolerance, Balance, & [employer] AM-PAC 6 Clicks Basic Mobility  Clinical Presentation: evolving  PT Education: Goals;PT Role;Plan of Care; Functional Mobility Training;Transfer Training;Pressure Relief;General Safety  Patient Education: Ed pt on functional mobility, safety awareness, importance of being up & OOB to regain strength, & prevention of sedentary complications, circulation ex's,  pressure relief & optimal breathing techniques  REQUIRES PT FOLLOW UP: Yes  Activity Tolerance  Activity Tolerance: Patient limited by fatigue;Patient limited by endurance; Patient limited by cognitive status       Patient Diagnosis(es): The primary encounter diagnosis was Acute pulmonary edema ([facility] [address] Diagnoses of Fatigue, unspecified type and Hypomagnesemia were also pertinent to this visit. has a past medical history of Antral ulcer, acute, Arthritis, CAD (coronary artery disease), GERD (gastroesophageal reflux disease), Hyperlipidemia, and Hypertension. has a past surgical history that includes Colonoscopy; hernia repair (06/20/2012); joint replacement; Tonsillectomy; Dental surgery; back surgery; Upper gastrointestinal endoscopy; Colon surgery; Carotid endarterectomy (Right); Upper gastrointestinal endoscopy (12/18/2017); hernia repair (06/18/2013); pr egd transoral biopsy single/multiple (N/A, 12/18/2017); Upper gastrointestinal endoscopy (N/A, 4/12/2018); Upper gastrointestinal endoscopy (04/12/2018); Dental surgery (05/21/2018); and Aortic valve repair. Restrictions  Restrictions/Precautions  Restrictions/Precautions: General Precautions,Fall Risk  Implants present? : Pacemaker  Position Activity Restriction  Other position/activity restrictions:  Up with assist, telemetry, contact isol 2* ESBL, heels off bed at all times, ext catheter, RUE IV, low sodium diet, ALARMS  Vision/Hearing  Vision: Within Functional Limits (Pt says she can read w/o glasses)  Hearing: Exceptions to [facility]  Hearing Exceptions: Hard of hearing/hearing concerns;Bilateral hearing aid (says she really does not werar aids, can hear w/o them)     Subjective  General  Chart Reviewed: Yes  Patient assessed for rehabilitation services?: Yes  Additional Pertinent Hx: pacemaker, CAD, HTN  Response To Previous Treatment: Not applicable  General Comment  Comments: RN okays PT  Subjective  Subjective: Pt agreeable to PT  Pain Screening  Patient Currently in Pain: Denies  Vital Signs  Patient Currently in Pain: Denies       Orientation  Orientation  Overall Orientation Status: Impaired  Orientation Level: Oriented to person;Disoriented to time;Oriented to situation;Disoriented to place (knew [facility] but not which one)  Social/Functional History  Social/Functional History  Lives With: Son  Type of Home: House  Home Layout: One level  Home Access: Stairs to enter with rails  Entrance Stairs - Number of Steps: 5  Entrance Stairs - Rails: Both  Bathroom Shower/Tub: Tub/Shower unit (WALK IN TUB per chart record)  Bathroom Toilet: Standard  Bathroom Equipment: Built-in shower seat,Grab bars in shower,Grab bars around toilet  Home Equipment: 4 wheeled [employer] Help From: Family (Son is retired, can be home all the time w/ pt)  ADL Assistance: Independent  Homemaking Assistance: Needs assistance (Son or girlfriend completes I ADL's, She does lt housekeeping & some laundry)  Ambulation Assistance: Independent (uses 5NW)  Transfer Assistance: Independent  Active : No  Patient's  Info:  Son  Occupation: Retired  Type of occupation: [employer]  Leisure & Hobbies: puzzles, read  IADL Comments: Question reliability of info pt reported as she is a poor historian, will need verified for accuracy  Additional Comments: Pt denies any falls past 3 months then Yulisa Kramer stated hurt R shoulder after fall when leg gave out but was probably a year ago  Cognition   Cognition  Overall Cognitive Status: Exceptions  Arousal/Alertness: Appropriate responses to stimuli  Following Commands: Follows one step commands with increased time; Follows one step commands with repetition  Attention Span: Appears intact  Memory: Decreased short term memory  Safety Judgement: Decreased awareness of need for assistance;Decreased awareness of need for safety  Problem Solving: Decreased awareness of errors;Assistance required to identify errors made;Assistance required to correct errors made;Assistance required to implement solutions  Insights: Decreased awareness of deficits  Initiation: Does not require cues  Sequencing: Requires cues for some    Objective     Observation/Palpation  Posture: Fair  Observation: Pt resting semifowlers position in bed, appears comfortable  Edema: BLE's    AROM RLE (degrees)  RLE AROM: WFL  AROM LLE (degrees)  LLE AROM : WFL  AROM RUE (degrees)  RUE General AROM: See OT assessment  AROM LUE (degrees)  LUE General AROM: See OT assessment  Strength RLE  Comment: 4/5 hip  Strength LLE  Comment: 4/5 hip  Strength RUE  Comment: See OT assessment  Strength LUE  Comment: See OT assessment  Tone RLE  RLE Tone: Normotonic  Tone LLE  LLE Tone: Normotonic  Motor Control  Gross Motor?: WFL  Sensation  Overall Sensation Status: WFL (pt denies any paresthesias in hands/feet)  Bed mobility  Supine to Sit: Contact guard assistance  Scooting: Contact guard assistance  Comment: MIN cues for pacing & pursed lip breathing tech, and use of BUE's to scoot fully out to EOB as well as awareness/assist with lines to increase safety. Transfers  Sit to Stand: Minimal Assistance;2 Person Assistance  Stand to sit: Moderate Assistance;2 Person Assistance  Stand Pivot Transfers: Moderate Assistance;2 Person Assistance  Car Transfer:  Moderate Assistance;2 Person Assistance  Comment: MOD VC + tactile assist on correct use of upper body for safe sit/stand + to back all way back to surface with walker until she feels touch behind legs & to ensure she reaches with UB support to arms of chair  Ambulation  Ambulation?: Yes  Ambulation 1  Surface: level tile  Device: Rolling Walker  Assistance: Moderate assistance;2 Person assistance  Quality of Gait: step to pattern, safety cues to keep walker close at all times & to amb INSIDE base of walker to keep eyes open & upright posture for safety/scanning  Gait Deviations: Decreased step length;Decreased step height  Distance: 20ftx2  Comments: Pt amb to BR for toileting, 2 Mod Assistance to sit to toilet. Pt stood with 2 Min Assistance,stood 3 minutes for pericare & to pull up brief, amb 2ft to sink for hand/oral hygiene x 4 minutes then ambulated 20 ft with R/walker & sat to chair with 2 Mod Assistance  Stairs/Curb  Stairs?: No     Balance  Sitting - Static: Good  Sitting - Dynamic: Good;-  Standing - Static: Fair;+ (R/W)  Standing - Dynamic: Fair (R/W)  Single Leg Stance R Le (R/W)  Single Leg Stance L Le (R/W)  Exercises  Comments: Ed circulation ex's, pursed lip breathing techniques to inc control of breathing,  Diaphragmatic breathing, & deep breathing with incentive spirometer including technique, frequency and purpose, pressure relief, & ex's to move what moves to maintain strength & joint congruency     Pt completed seated lateral WS seated & completed sit to stands x 5 to promote mobility and functional pre gait activities & completed static standing weight shifts & picking feet up off floor with UB support at R/walker to improve core strength & stability       All lines intact, call light within reach, and patient positioned comfortably at end of treatment. All patient needs addressed prior to ending therapy session.           Plan   Plan  Times per week: 1-2x/D, 5-6D/week  Current Treatment Recommendations: Strengthening,Balance Training,Functional Mobility Training,Transfer Training,ADL/Self-care Training,Gait Gabriel Andrews Exercise Program,Safety Education & Training,Patient/Caregiver Education & Training,Positioning  Safety Devices  Type of devices: Call light within reach,Chair alarm in place,Gait belt,Patient at risk for falls,Left in chair,Nurse notified    G-Code       OutComes Score                                                  AM-PAC Score  AM-PAC Inpatient Mobility Raw Score : 14 (04/14/22 1009)  AM-PAC Inpatient T-Scale Score : 38.1 (04/14/22 1009)  Mobility Inpatient CMS 0-100% Score: 61.29 (04/14/22 1009)  Mobility Inpatient CMS G-Code Modifier : CL (04/14/22 1009)          Goals  Short term goals  Time Frame for Short term goals: 12 visits  Short term goal 1: Inc bed-mobility & transfers to independent to enable pt to safely get in/OOB & chair  Short term goal 2: Inc gait to amb 200ft or > indep w/ RW to enable pt to return to previous level of independence & able to demonstrate indep/ safe use of RW in functional activities including bending/ reaching, approaching counters and turning to sit. Short term goal 3: Inc strength to [facility] & standing balance to good with device to facilitate pt independence for performance of ADL's & functional mobility, & reduce fall risk  Short term goal 4: Pt able to tolerate 30-40 min of activity to include 15-20 reps of ex, NMR & functional mobility with device to facilitate activity tolerance to [facility]  Short term goal 5: Ed pt on home ex's, safety & energy principles, FALL PREVENTION, CHF education including CHF symptom tracker & self check plan worksheet, fall prevention, & issue written pt education       Therapy Time   Individual Concurrent Group Co-treatment   Time In 0918         Time Out 1008         Minutes 50+10=60              Additional 10 minutes for chart review      Treatment time: 45 minutes    Co-treatment with OT warranted secondary to decreased safety and independence requiring 2 skilled therapy professionals to address individual discipline's goals.  PT addressing core control in transitions with bed mobility & transfers, seated/standing posture, standing postural alignment and breathing techniques, safety/scanning, & fall prevention in ambulation techniques.           [address], PT

## 2022-04-14 NOTE — PROGRESS NOTES
Transitions of Care Pharmacy Service   Medication Review    The patient's list of current home medications has been reviewed. Patient is not familiar with her medication names and is very hard of hearing. She brought a list of her medications with her, but it has not been updated. Spoke with her attending physicians office and [employer]. Source(s) of information: son/ [facility]/ Office of Zacarias Aponte CNP [facility])    Based on information provided by the above source(s), I have updated the patient's home med list as described below. Please review the ACTION REQUESTED section of this note below for any discrepancies on current hospital orders. I changed or updated the following medications on the patient's home medication list:  Removed Plavix 75mg - tx complete     Added Warfarin 2mg - 3 tabs (6mg) on Mon and Fri and 2 tabs (4mg) all other days     Adjusted   none   Other Notes Son states have been having a hard time getting her INR into range, so dosage has been increasing  Pt is behind on refilling her Celexa per [facility], but physician has sent in a refill, and she should be taking it. PROVIDER ACTION REQUESTED  Medications that need to be addressed by a physician/nurse practitioner:    Medication Action Requested   Plavix     Please DC as not a current med         Please feel free to call me with any questions about this encounter. Thank you.     Marino Contreras [facility]   Transitions of Care Pharmacy Service  Phone:  [phone]  Fax: [phone]      Electronically signed by Marino Contreras [facility] on 4/14/2022 at 9:23 AM           Medications Prior to Admission: warfarin (COUMADIN) 2 MG tablet, Take 6 mg by mouth Twice a Week Monday AND FRIDAY  warfarin (COUMADIN) 2 MG tablet, Take 4 mg by mouth Five times weekly All other days  acetaminophen (TYLENOL 8 HOUR ARTHRITIS PAIN) 650 MG extended release tablet, Take 650 mg by mouth every 8 hours as needed for Pain  pantoprazole (PROTONIX) 40 MG tablet, TAKE ONE TABLET BY MOUTH EVERY MORNING BEFORE BREAKFAST (Patient taking differently: Take 40 mg by mouth every morning (before breakfast) TAKE ONE TABLET BY MOUTH EVERY MORNING BEFORE BREAKFAST)  citalopram (CELEXA) 20 MG tablet, TAKE ONE TABLET BY MOUTH DAILY (Patient taking differently: Take 20 mg by mouth daily )  atorvastatin (LIPITOR) 40 MG tablet, Take 1 tablet by mouth daily  lisinopril (PRINIVIL;ZESTRIL) 10 MG tablet, TAKE ONE TABLET BY MOUTH DAILY (Patient taking differently: Take 10 mg by mouth daily TAKE ONE TABLET BY MOUTH DAILY)  metoprolol succinate (TOPROL XL) 25 MG extended release tablet, TAKE ONE TABLET BY MOUTH DAILY (Patient taking differently: Take 25 mg by mouth daily )  Calcium Carbonate-Vitamin D (CALTRATE 600+D PO), Take 1 tablet by mouth 2 times daily. Multiple Vitamins-Minerals (CENTRUM SILVER ADULT 50+) TABS, Take 1 tablet by mouth daily.

## 2022-04-18 ENCOUNTER — HOSPITAL ENCOUNTER (OUTPATIENT)
Age: 87
Setting detail: SPECIMEN
Discharge: HOME | End: 2022-04-18

## 2022-04-18 DIAGNOSIS — E83.42 HYPOMAGNESEMIA: ICD-10-CM

## 2022-04-18 LAB
ANION GAP SERPL CALCULATED.3IONS-SCNC: 12 MMOL/L (ref 9–17)
BUN BLDV-MCNC: 30 MG/DL (ref 8–23)
CALCIUM SERPL-MCNC: 9.7 MG/DL (ref 8.6–10.4)
CHLORIDE BLD-SCNC: 100 MMOL/L (ref 98–107)
CO2: 24 MMOL/L (ref 20–31)
CREAT SERPL-MCNC: 0.92 MG/DL (ref 0.5–0.9)
GFR AFRICAN AMERICAN: >60 ML/MIN
GFR NON-AFRICAN AMERICAN: 57 ML/MIN
GFR SERPL CREATININE-BSD FRML MDRD: ABNORMAL ML/MIN/{1.73_M2}
GLUCOSE BLD-MCNC: 102 MG/DL (ref 70–99)
MAGNESIUM: 1.5 MG/DL (ref 1.6–2.6)
POTASSIUM SERPL-SCNC: 4.5 MMOL/L (ref 3.7–5.3)
SODIUM BLD-SCNC: 136 MMOL/L (ref 135–144)

## 2022-04-19 ENCOUNTER — APPOINTMENT (OUTPATIENT)
Dept: GENERAL RADIOLOGY | Age: 87
DRG: 291 | End: 2022-04-19
Payer: MEDICARE

## 2022-04-19 ENCOUNTER — HOSPITAL ENCOUNTER (INPATIENT)
Age: 87
LOS: 1 days | Discharge: HOME | DRG: 291 | End: 2022-04-20
Attending: EMERGENCY MEDICINE | Admitting: FAMILY MEDICINE
Payer: MEDICARE

## 2022-04-19 DIAGNOSIS — I50.23 ACUTE ON CHRONIC SYSTOLIC CONGESTIVE HEART FAILURE (HCC): Primary | ICD-10-CM

## 2022-04-19 PROBLEM — I50.9 HEART FAILURE (HCC): Status: ACTIVE | Noted: 2022-04-19

## 2022-04-19 LAB
ABSOLUTE EOS #: 0.13 K/UL (ref 0–0.44)
ABSOLUTE IMMATURE GRANULOCYTE: 0.02 K/UL (ref 0–0.3)
ABSOLUTE LYMPH #: 1.12 K/UL (ref 1.1–3.7)
ABSOLUTE MONO #: 0.55 K/UL (ref 0.1–1.2)
ALBUMIN SERPL-MCNC: 4 G/DL (ref 3.5–5.2)
ALP BLD-CCNC: 159 U/L (ref 35–104)
ALT SERPL-CCNC: 16 U/L (ref 5–33)
ANION GAP SERPL CALCULATED.3IONS-SCNC: 10 MMOL/L (ref 9–17)
AST SERPL-CCNC: 22 U/L
BASOPHILS # BLD: 1 % (ref 0–2)
BASOPHILS ABSOLUTE: 0.04 K/UL (ref 0–0.2)
BILIRUB SERPL-MCNC: 0.25 MG/DL (ref 0.3–1.2)
BUN BLDV-MCNC: 27 MG/DL (ref 8–23)
BUN/CREAT BLD: 30 (ref 9–20)
CALCIUM SERPL-MCNC: 9.4 MG/DL (ref 8.6–10.4)
CHLORIDE BLD-SCNC: 99 MMOL/L (ref 98–107)
CO2: 25 MMOL/L (ref 20–31)
CREAT SERPL-MCNC: 0.9 MG/DL (ref 0.5–0.9)
EOSINOPHILS RELATIVE PERCENT: 3 % (ref 1–4)
GFR AFRICAN AMERICAN: >60 ML/MIN
GFR NON-AFRICAN AMERICAN: 59 ML/MIN
GFR SERPL CREATININE-BSD FRML MDRD: ABNORMAL ML/MIN/{1.73_M2}
GLUCOSE BLD-MCNC: 99 MG/DL (ref 70–99)
HCT VFR BLD CALC: 39.4 % (ref 36.3–47.1)
HEMOGLOBIN: 12.2 G/DL (ref 11.9–15.1)
IMMATURE GRANULOCYTES: 0 %
INR BLD: 2.3
IRON SATURATION: 17 % (ref 20–55)
IRON: 50 UG/DL (ref 37–145)
LYMPHOCYTES # BLD: 22 % (ref 24–43)
MCH RBC QN AUTO: 27.7 PG (ref 25.2–33.5)
MCHC RBC AUTO-ENTMCNC: 31 G/DL (ref 28.4–34.8)
MCV RBC AUTO: 89.5 FL (ref 82.6–102.9)
MONOCYTES # BLD: 11 % (ref 3–12)
NRBC AUTOMATED: 0 PER 100 WBC
PARTIAL THROMBOPLASTIN TIME: 40.9 SEC (ref 23.9–33.8)
PDW BLD-RTO: 13.6 % (ref 11.8–14.4)
PLATELET # BLD: 205 K/UL (ref 138–453)
PMV BLD AUTO: 9.7 FL (ref 8.1–13.5)
POTASSIUM SERPL-SCNC: 4.9 MMOL/L (ref 3.7–5.3)
PRO-BNP: 3156 PG/ML
PROTHROMBIN TIME: 25.1 SEC (ref 11.5–14.2)
RBC # BLD: 4.4 M/UL (ref 3.95–5.11)
SEG NEUTROPHILS: 63 % (ref 36–65)
SEGMENTED NEUTROPHILS ABSOLUTE COUNT: 3.24 K/UL (ref 1.5–8.1)
SODIUM BLD-SCNC: 134 MMOL/L (ref 135–144)
TOTAL IRON BINDING CAPACITY: 302 UG/DL (ref 250–450)
TOTAL PROTEIN: 7.1 G/DL (ref 6.4–8.3)
TROPONIN, HIGH SENSITIVITY: 26 NG/L (ref 0–14)
TROPONIN, HIGH SENSITIVITY: 26 NG/L (ref 0–14)
UNSATURATED IRON BINDING CAPACITY: 252 UG/DL (ref 112–347)
WBC # BLD: 5.1 K/UL (ref 3.5–11.3)

## 2022-04-19 PROCEDURE — 85025 COMPLETE CBC W/AUTO DIFF WBC: CPT

## 2022-04-19 PROCEDURE — APPSS45 APP SPLIT SHARED TIME 31-45 MINUTES: Performed by: NURSE PRACTITIONER

## 2022-04-19 PROCEDURE — 6360000002 HC RX W HCPCS: Performed by: NURSE PRACTITIONER

## 2022-04-19 PROCEDURE — 2580000003 HC RX 258: Performed by: NURSE PRACTITIONER

## 2022-04-19 PROCEDURE — 6360000002 HC RX W HCPCS: Performed by: EMERGENCY MEDICINE

## 2022-04-19 PROCEDURE — 80053 COMPREHEN METABOLIC PANEL: CPT

## 2022-04-19 PROCEDURE — 83550 IRON BINDING TEST: CPT

## 2022-04-19 PROCEDURE — 96374 THER/PROPH/DIAG INJ IV PUSH: CPT

## 2022-04-19 PROCEDURE — 6370000000 HC RX 637 (ALT 250 FOR IP): Performed by: NURSE PRACTITIONER

## 2022-04-19 PROCEDURE — 83880 ASSAY OF NATRIURETIC PEPTIDE: CPT

## 2022-04-19 PROCEDURE — 36415 COLL VENOUS BLD VENIPUNCTURE: CPT

## 2022-04-19 PROCEDURE — 2060000000 HC ICU INTERMEDIATE R&B

## 2022-04-19 PROCEDURE — 85610 PROTHROMBIN TIME: CPT

## 2022-04-19 PROCEDURE — 93005 ELECTROCARDIOGRAM TRACING: CPT | Performed by: EMERGENCY MEDICINE

## 2022-04-19 PROCEDURE — 83540 ASSAY OF IRON: CPT

## 2022-04-19 PROCEDURE — 99222 1ST HOSP IP/OBS MODERATE 55: CPT | Performed by: NURSE PRACTITIONER

## 2022-04-19 PROCEDURE — 84484 ASSAY OF TROPONIN QUANT: CPT

## 2022-04-19 PROCEDURE — 71045 X-RAY EXAM CHEST 1 VIEW: CPT

## 2022-04-19 PROCEDURE — 99285 EMERGENCY DEPT VISIT HI MDM: CPT

## 2022-04-19 PROCEDURE — 85730 THROMBOPLASTIN TIME PARTIAL: CPT

## 2022-04-19 RX ORDER — MAGNESIUM SULFATE IN WATER 40 MG/ML
2000 INJECTION, SOLUTION INTRAVENOUS PRN
Status: DISCONTINUED | OUTPATIENT
Start: 2022-04-19 | End: 2022-04-20 | Stop reason: HOSPADM

## 2022-04-19 RX ORDER — FUROSEMIDE 10 MG/ML
40 INJECTION INTRAMUSCULAR; INTRAVENOUS 2 TIMES DAILY
Status: DISCONTINUED | OUTPATIENT
Start: 2022-04-19 | End: 2022-04-20 | Stop reason: HOSPADM

## 2022-04-19 RX ORDER — ONDANSETRON 2 MG/ML
4 INJECTION INTRAMUSCULAR; INTRAVENOUS EVERY 6 HOURS PRN
Status: DISCONTINUED | OUTPATIENT
Start: 2022-04-19 | End: 2022-04-20 | Stop reason: HOSPADM

## 2022-04-19 RX ORDER — ACETAMINOPHEN 325 MG/1
650 TABLET ORAL EVERY 6 HOURS PRN
Status: DISCONTINUED | OUTPATIENT
Start: 2022-04-19 | End: 2022-04-20 | Stop reason: HOSPADM

## 2022-04-19 RX ORDER — POTASSIUM CHLORIDE 7.45 MG/ML
10 INJECTION INTRAVENOUS PRN
Status: DISCONTINUED | OUTPATIENT
Start: 2022-04-19 | End: 2022-04-20 | Stop reason: HOSPADM

## 2022-04-19 RX ORDER — WARFARIN SODIUM 2 MG/1
4 TABLET ORAL
Status: DISCONTINUED | OUTPATIENT
Start: 2022-04-19 | End: 2022-04-19

## 2022-04-19 RX ORDER — SODIUM CHLORIDE 0.9 % (FLUSH) 0.9 %
5-40 SYRINGE (ML) INJECTION EVERY 12 HOURS SCHEDULED
Status: DISCONTINUED | OUTPATIENT
Start: 2022-04-19 | End: 2022-04-20 | Stop reason: HOSPADM

## 2022-04-19 RX ORDER — ACETAMINOPHEN 650 MG/1
650 SUPPOSITORY RECTAL EVERY 6 HOURS PRN
Status: DISCONTINUED | OUTPATIENT
Start: 2022-04-19 | End: 2022-04-20 | Stop reason: HOSPADM

## 2022-04-19 RX ORDER — WARFARIN SODIUM 2 MG/1
4 TABLET ORAL
Status: DISCONTINUED | OUTPATIENT
Start: 2022-04-20 | End: 2022-04-20 | Stop reason: HOSPADM

## 2022-04-19 RX ORDER — SODIUM CHLORIDE 0.9 % (FLUSH) 0.9 %
10 SYRINGE (ML) INJECTION PRN
Status: DISCONTINUED | OUTPATIENT
Start: 2022-04-19 | End: 2022-04-20 | Stop reason: HOSPADM

## 2022-04-19 RX ORDER — METOPROLOL SUCCINATE 25 MG/1
25 TABLET, EXTENDED RELEASE ORAL DAILY
Status: DISCONTINUED | OUTPATIENT
Start: 2022-04-20 | End: 2022-04-20 | Stop reason: HOSPADM

## 2022-04-19 RX ORDER — ONDANSETRON 4 MG/1
4 TABLET, ORALLY DISINTEGRATING ORAL EVERY 8 HOURS PRN
Status: DISCONTINUED | OUTPATIENT
Start: 2022-04-19 | End: 2022-04-20 | Stop reason: HOSPADM

## 2022-04-19 RX ORDER — FUROSEMIDE 10 MG/ML
20 INJECTION INTRAMUSCULAR; INTRAVENOUS ONCE
Status: COMPLETED | OUTPATIENT
Start: 2022-04-19 | End: 2022-04-19

## 2022-04-19 RX ORDER — SODIUM CHLORIDE 9 MG/ML
INJECTION, SOLUTION INTRAVENOUS PRN
Status: DISCONTINUED | OUTPATIENT
Start: 2022-04-19 | End: 2022-04-20 | Stop reason: HOSPADM

## 2022-04-19 RX ORDER — CITALOPRAM 20 MG/1
20 TABLET ORAL DAILY
Status: DISCONTINUED | OUTPATIENT
Start: 2022-04-20 | End: 2022-04-20 | Stop reason: HOSPADM

## 2022-04-19 RX ORDER — POTASSIUM CHLORIDE 20 MEQ/1
40 TABLET, EXTENDED RELEASE ORAL PRN
Status: DISCONTINUED | OUTPATIENT
Start: 2022-04-19 | End: 2022-04-20 | Stop reason: HOSPADM

## 2022-04-19 RX ORDER — WARFARIN SODIUM 3 MG/1
6 TABLET ORAL
Status: DISCONTINUED | OUTPATIENT
Start: 2022-04-22 | End: 2022-04-20 | Stop reason: HOSPADM

## 2022-04-19 RX ORDER — LISINOPRIL 10 MG/1
10 TABLET ORAL DAILY
Status: DISCONTINUED | OUTPATIENT
Start: 2022-04-20 | End: 2022-04-20 | Stop reason: HOSPADM

## 2022-04-19 RX ORDER — POLYETHYLENE GLYCOL 3350 17 G/17G
17 POWDER, FOR SOLUTION ORAL DAILY PRN
Status: DISCONTINUED | OUTPATIENT
Start: 2022-04-19 | End: 2022-04-20 | Stop reason: HOSPADM

## 2022-04-19 RX ORDER — ATORVASTATIN CALCIUM 40 MG/1
40 TABLET, FILM COATED ORAL DAILY
Status: DISCONTINUED | OUTPATIENT
Start: 2022-04-19 | End: 2022-04-20 | Stop reason: HOSPADM

## 2022-04-19 RX ORDER — PANTOPRAZOLE SODIUM 40 MG/1
40 TABLET, DELAYED RELEASE ORAL
Status: DISCONTINUED | OUTPATIENT
Start: 2022-04-20 | End: 2022-04-20 | Stop reason: HOSPADM

## 2022-04-19 RX ADMIN — ATORVASTATIN CALCIUM 40 MG: 40 TABLET, FILM COATED ORAL at 19:56

## 2022-04-19 RX ADMIN — SODIUM CHLORIDE, PRESERVATIVE FREE 10 ML: 5 INJECTION INTRAVENOUS at 19:57

## 2022-04-19 RX ADMIN — FUROSEMIDE 20 MG: 10 INJECTION, SOLUTION INTRAMUSCULAR; INTRAVENOUS at 14:54

## 2022-04-19 RX ADMIN — FUROSEMIDE 40 MG: 10 INJECTION, SOLUTION INTRAMUSCULAR; INTRAVENOUS at 18:15

## 2022-04-19 ASSESSMENT — ENCOUNTER SYMPTOMS
DIARRHEA: 0
COUGH: 0
CHOKING: 0
SHORTNESS OF BREATH: 1
PHOTOPHOBIA: 0
NAUSEA: 0
CHEST TIGHTNESS: 1
ABDOMINAL PAIN: 0
SINUS PRESSURE: 0
VOMITING: 0
CHEST TIGHTNESS: 0
COLOR CHANGE: 0
VOICE CHANGE: 0
ABDOMINAL DISTENTION: 0
BACK PAIN: 0
SINUS PAIN: 0

## 2022-04-19 ASSESSMENT — PAIN SCALES - GENERAL: PAINLEVEL_OUTOF10: 0

## 2022-04-19 NOTE — H&P
[facility]  Office: [address], DO, Bry Waggoneroneil, DO, Thomas Eusebia, DO, Radha Carrasco, DO, Kosta Altamirano MD, Lit Sibley MD, Chivo Ceja MD, Daisha Benites MD, Jesus Manuel Alas MD, Harley Hull MD, Mark Orta MD, Ana Maria Ryan, DO, Ada Munoz, DO, Myron De Guzman MD,  Darci Quach, DO, Erick Batista MD, Sandra Arriola MD, May Alexander MD, Dami Greenberg, DO, Sully Priest MD, Chel Bustamante MD, Avril Peterson, [facility], [facility], [facility], Dann Cortés, [facility], Tootie Wooten, CNP, Roseanne Enrique, CNP, Tammy Dupont, CNP, LAZARO YangC, Vielka Smith, [facility], Marie Olivares, CNP, Haja Lomeli, CNP, Saray Hebert, CNP, Shari Johansen, CNS, Varsha Mane, [facility], Nicolasa Olivia, CNP, Shauna Medina, CNP, Taty Chery, [facility]    HISTORY AND PHYSICAL EXAMINATION            Date:   4/19/2022  Patient name:  Suzanne Torres  Date of admission:  4/19/2022  1:42 PM  MRN:   [id number]  Account:  [de-identified]  YOB: 1931  PCP:    TASHA Dorantes CNP  Room:   [facility] LESLIE/LESLIE  Code Status:    Prior    Chief Complaint:     Chief Complaint   Patient presents with    Shortness of Breath       History Obtained From:     patient, family member - son    History of Present Illness:     Suzanne Torres is a 80 y.o. Non- / non  female who presents with Shortness of Breath   and is admitted to the hospital for the management of Acute on chronic diastolic CHF (congestive heart failure) ([facility] [address] Patient reports to the emergency department with her son secondary to a 48-hour decline in functional status. Patient was recently hospitalized for shortness of breath and functional decline where she was diagnosed with acute on chronic diastolic heart failure exacerbation. Patient was diuresed and discharged on 20 mg of Lasix daily.   Son reports that he believes she has been compliant with this medication. Emergency department evaluation was completed and chest x-ray shows increasing pulmonary edema and therefore ER would like her to be admitted for IV diuretics. SNF was evaluated and offered to the patient family during her last admission last week and this was refused. At this time patient's only complaint is general malaise and exertional dyspnea. She expressed that her respiratory status is not nearly as severe as her last admission however she feels as though she is getting weaker each day and this is similar to prior presentation prior to her last severe CHF exacerbation. Past Medical History:     Past Medical History:   Diagnosis Date    Antral ulcer, acute 12/18/2017    per EGD    Arthritis     CAD (coronary artery disease)     GERD (gastroesophageal reflux disease)     Hyperlipidemia     Hypertension         Past Surgical History:     Past Surgical History:   Procedure Laterality Date    AORTIC VALVE REPAIR      tavr    BACK SURGERY      broke her back    CAROTID ENDARTERECTOMY Right     COLON SURGERY      COLONOSCOPY      DENTAL SURGERY      top dentures    DENTAL SURGERY  05/21/2018    6 teeth removed    HERNIA REPAIR  06/20/2012    HERNIA REPAIR  06/18/2013    JOINT REPLACEMENT      AL EGD TRANSORAL BIOPSY SINGLE/MULTIPLE N/A 12/18/2017    EGD BIOPSY performed by David Sood MD at [address] [facility] ENDOSCOPY      UPPER GASTROINTESTINAL ENDOSCOPY  12/18/2017    1.5 cm antral ulcer,clean base    UPPER GASTROINTESTINAL ENDOSCOPY N/A 4/12/2018    EGD BIOPSY performed by David Sood MD at [place] [address]  04/12/2018    Ulcer appears to be almost healed. There is some inflammation at the site of ulcer. Medications Prior to Admission:     Prior to Admission medications    Medication Sig Start Date End Date Taking?  Authorizing Provider   furosemide (LASIX) 20 MG Disease Mother     Heart Disease Sister     Heart Disease Brother        Review of Systems:     Positive and Negative as described in HPI. Review of Systems   Constitutional: Positive for activity change. Negative for fever. HENT: Negative for sinus pressure, sinus pain and voice change. Eyes: Negative for photophobia and visual disturbance. Respiratory: Positive for shortness of breath. Negative for cough, choking and chest tightness. Cardiovascular: Negative for chest pain, palpitations and leg swelling. Gastrointestinal: Negative for abdominal distention, abdominal pain and diarrhea. Endocrine: Negative for polyphagia and polyuria. Genitourinary: Negative for difficulty urinating, urgency and vaginal pain. Musculoskeletal: Negative for arthralgias and myalgias. Skin: Negative for color change, rash and wound. Allergic/Immunologic: Negative for environmental allergies and immunocompromised state. Neurological: Positive for weakness. Negative for dizziness and syncope. Hematological: Negative for adenopathy. Does not bruise/bleed easily. Psychiatric/Behavioral: Negative for agitation and self-injury. Physical Exam:   /66   Pulse 66   Temp 98.2 °F (36.8 °C) (Oral)   Resp 18   Wt 186 lb (84.4 kg)   SpO2 100%   BMI 32.95 kg/m²   Temp (24hrs), Av.2 °F (36.8 °C), Min:98.2 °F (36.8 °C), Max:98.2 °F (36.8 °C)    No results for input(s): POCGLU in the last 72 hours. No intake or output data in the 24 hours ending 22 1550    Physical Exam  Constitutional:       Appearance: Normal appearance. She is normal weight. HENT:      Head: Normocephalic and atraumatic. Nose: Nose normal.   Eyes:      Extraocular Movements: Extraocular movements intact. Conjunctiva/sclera: Conjunctivae normal.      Pupils: Pupils are equal, round, and reactive to light. Cardiovascular:      Rate and Rhythm: Normal rate and regular rhythm.       Heart sounds: No murmur heard.  No gallop. Pulmonary:      Breath sounds: Examination of the right-lower field reveals decreased breath sounds and rales. Examination of the left-lower field reveals decreased breath sounds and rales. Decreased breath sounds and rales present. Abdominal:      General: Abdomen is flat. Bowel sounds are normal.      Palpations: Abdomen is soft. Musculoskeletal:         General: No swelling or tenderness. Normal range of motion. Cervical back: Normal range of motion and neck supple. Skin:     General: Skin is warm and dry. Capillary Refill: Capillary refill takes less than 2 seconds. Neurological:      General: No focal deficit present. Mental Status: She is alert and oriented to person, place, and time. Mental status is at baseline.    Psychiatric:         Mood and Affect: Mood normal.         Behavior: Behavior normal.         Investigations:      Laboratory Testing:  Recent Results (from the past 24 hour(s))   EKG 12 Lead    Collection Time: 04/19/22  1:45 PM   Result Value Ref Range    Ventricular Rate 60 BPM    Atrial Rate 61 BPM    QRS Duration 148 ms    Q-T Interval 454 ms    QTc Calculation (Bazett) 454 ms    R Axis 33 degrees    T Axis 45 degrees   CBC with Auto Differential    Collection Time: 04/19/22  2:15 PM   Result Value Ref Range    WBC 5.1 3.5 - 11.3 k/uL    RBC 4.40 3.95 - 5.11 m/uL    Hemoglobin 12.2 11.9 - 15.1 g/dL    Hematocrit 39.4 36.3 - 47.1 %    MCV 89.5 82.6 - 102.9 fL    MCH 27.7 25.2 - 33.5 pg    MCHC 31.0 28.4 - 34.8 g/dL    RDW 13.6 11.8 - 14.4 %    Platelets 518 303 - 774 k/uL    MPV 9.7 8.1 - 13.5 fL    NRBC Automated 0.0 0.0 per 100 WBC    Seg Neutrophils 63 36 - 65 %    Lymphocytes 22 (L) 24 - 43 %    Monocytes 11 3 - 12 %    Eosinophils % 3 1 - 4 %    Basophils 1 0 - 2 %    Immature Granulocytes 0 0 %    Segs Absolute 3.24 1.50 - 8.10 k/uL    Absolute Lymph # 1.12 1.10 - 3.70 k/uL    Absolute Mono # 0.55 0.10 - 1.20 k/uL    Absolute Eos # 0.13 0.00 - 0.44 k/uL    Basophils Absolute 0.04 0.00 - 0.20 k/uL    Absolute Immature Granulocyte 0.02 0.00 - 0.30 k/uL   Comprehensive Metabolic Panel w/ Reflex to MG    Collection Time: 04/19/22  2:15 PM   Result Value Ref Range    Glucose 99 70 - 99 mg/dL    BUN 27 (H) 8 - 23 mg/dL    CREATININE 0.90 0.50 - 0.90 mg/dL    Bun/Cre Ratio 30 (H) 9 - 20    Calcium 9.4 8.6 - 10.4 mg/dL    Sodium 134 (L) 135 - 144 mmol/L    Potassium 4.9 3.7 - 5.3 mmol/L    Chloride 99 98 - 107 mmol/L    CO2 25 20 - 31 mmol/L    Anion Gap 10 9 - 17 mmol/L    Alkaline Phosphatase 159 (H) 35 - 104 U/L    ALT 16 5 - 33 U/L    AST 22 <32 U/L    Total Bilirubin 0.25 (L) 0.3 - 1.2 mg/dL    Total Protein 7.1 6.4 - 8.3 g/dL    Albumin 4.0 3.5 - 5.2 g/dL    GFR Non- 59 (L) >60 mL/min    GFR African American >60 >60 mL/min    GFR Comment         Troponin    Collection Time: 04/19/22  2:15 PM   Result Value Ref Range    Troponin, High Sensitivity 26 (H) 0 - 14 ng/L   Brain Natriuretic Peptide    Collection Time: 04/19/22  2:15 PM   Result Value Ref Range    Pro-BNP 3,156 (H) <300 pg/mL   APTT    Collection Time: 04/19/22  2:15 PM   Result Value Ref Range    PTT 40.9 (H) 23.9 - 33.8 sec   Protime-INR    Collection Time: 04/19/22  2:15 PM   Result Value Ref Range    Protime 25.1 (H) 11.5 - 14.2 sec    INR 2.3        Imaging/Diagnostics:  XR CHEST PORTABLE    Result Date: 4/13/2022  Interstitial and mild alveolar pulmonary edema. Clinical correlation is required to exclude superimposed infection. Cardiomegaly which is markedly increased relative to prior comparison exams on a background of prior TAVR. Correlate for possible pericardial effusion. Suspected small bilateral pleural effusions.      XR CHEST 1 VIEW    Result Date: 4/19/2022  Mild pulmonary vascular congestion       Assessment :      Hospital Problems           Last Modified POA    * (Principal) [facility] 4/19/2022 Yes Pulmonary hypertension ([address] 4/19/2022 Yes    Atrial fibrillation ([address] 4/19/2022 Yes    Hypertension 4/19/2022 Yes    Mixed hyperlipidemia 4/19/2022 Yes    CAD (coronary artery disease) (Chronic) 4/19/2022 Yes    Pulmonary edema cardiac cause ([address] 4/19/2022 Yes          Plan:     Patient status inpatient in the  Progressive Unit/Step down    Acute on chronic diastolic heart failure exacerbation with pulmonary hypertension with pulmonary edema of cardiac cause  Increase Lasix to 40 twice daily for now  No need to repeat cardiac echo as this was completed 1 week ago  Anticipate short hospital course, can likely be discharged within 24 hours on increased home dosage of Lasix  PT/OT, patient refused SNF 1 week ago  Atrial fibrillation on chronic anticoagulation  Continue lisinopril and metoprolol  Continue Coumadin with a goal INR of 2-3  Hypertension with mixed hyperlipidemia  Continue lisinopril, metoprolol  Continue Lipitor  CAD  As above    Consultations:   IP CONSULT TO HOSPITALIST  IP CONSULT TO HEART FAILURE NURSE/COORDINATOR  IP CONSULT TO DIETITIAN     Patient is admitted as inpatient status because of co-morbidities listed above, severity of signs and symptoms as outlined, requirement for current medical therapies and most importantly because of direct risk to patient if care not provided in a hospital setting. Expected length of stay > 48 hours.     TASHA Yu NP  4/19/2022  3:50 PM    Copy sent to Dr. Scott Sanchez APRN - CNP

## 2022-04-19 NOTE — PROGRESS NOTES
Warfarin Dosing - Pharmacy Consult Note  Consulting Provider: TASHA Dolan NP  Indication:  Atrial Fibrillation  Warfarin Dose prior to admission: 6 mg Mon/Fri and 4 mg all other days   Concurrent anticoagulants/antiplatelets: none  Significant Drug Interactions:  no new interacting medications  Recent Labs     04/19/22  1415   INR 2.3   HGB 12.2      LABALBU 4.0     Recent warfarin administrations        No warfarin orders with administrations found. Orders not given:            warfarin (COUMADIN) tablet 6 mg    warfarin placeholder: dosing by pharmacy    warfarin (COUMADIN) tablet 4 mg                   Date   INR    Dose  4/19       2.3         4 mg    Assessment/Plan  (Goal INR: 2 - 3)  Patient is admitted with CHF exacerbation, which will likely affect absorption of vitamin K. Will continue with patient's home regimen of 6 mg Mon/Fri and 4 mg all other days. Patient has already taken her warfarin today. Active problem list reviewed. INR orders are placed. Chart reviewed for pertinent labs, drug/diet interactions, and past doses. Documentation of patient's clinical condition was reviewed. Pharmacy Dosing:  Pharmacy will continue to follow.

## 2022-04-19 NOTE — ED NOTES
Pt c/o SOB. Pt son tried to call PCP and they told him to bring here back here because she was just here on 4/13 for CHF.       Dorothy Madsen, ELMO  04/19/22 1244

## 2022-04-19 NOTE — ED PROVIDER NOTES
Substance and Sexual Activity    Alcohol use: No    Drug use: No    Sexual activity: Not on file   Other Topics Concern    Not on file   Social History Narrative    Not on file     Social Determinants of Health     Financial Resource Strain:     Difficulty of Paying Living Expenses: Not on file   Food Insecurity:     Worried About Running Out of Food in the Last Year: Not on file    Cherry of Food in the Last Year: Not on file   Transportation Needs:     Lack of Transportation (Medical): Not on file    Lack of Transportation (Non-Medical): Not on file   Physical Activity:     Days of Exercise per Week: Not on file    Minutes of Exercise per Session: Not on file   Stress:     Feeling of Stress : Not on file   Social Connections:     Frequency of Communication with Friends and Family: Not on file    Frequency of Social Gatherings with Friends and Family: Not on file    Attends [employer] Services: Not on file    Active Member of [address] [place] [address] [email or web address] or Organizations: Not on file    Attends Club or Organization Meetings: Not on file    Marital Status: Not on file   Intimate Partner Violence:     Fear of Current or Ex-Partner: Not on file    Emotionally Abused: Not on file    Physically Abused: Not on file    Sexually Abused: Not on file   Housing Stability:     Unable to Pay for Housing in the Last Year: Not on file    Number of [place] in the Last Year: Not on file    Unstable Housing in the Last Year: Not on file       Family History   Problem Relation Age of Onset    Heart Disease Mother     Heart Disease Sister     Heart Disease Brother        Allergies:    Patient has no known allergies. Home Medications:  Prior to Admission medications    Medication Sig Start Date End Date Taking?  Authorizing Provider   furosemide (LASIX) 20 MG tablet Take 1 tablet by mouth daily 4/14/22   Therese Box MD   warfarin (COUMADIN) 2 MG tablet Take 6 mg by mouth Twice a Week Monday AND FRIDAY    Historical Provider, MD   warfarin (COUMADIN) 2 MG tablet Take 4 mg by mouth Five times weekly All other days    Historical Provider, MD   acetaminophen (TYLENOL 8 HOUR ARTHRITIS PAIN) 650 MG extended release tablet Take 650 mg by mouth every 8 hours as needed for Pain    Historical Provider, MD   pantoprazole (PROTONIX) 40 MG tablet TAKE ONE TABLET BY MOUTH EVERY MORNING BEFORE BREAKFAST  Patient taking differently: Take 40 mg by mouth every morning (before breakfast) TAKE ONE TABLET BY MOUTH EVERY MORNING BEFORE BREAKFAST 1/22/21   TASHA Garrison NP   citalopram (CELEXA) 20 MG tablet TAKE ONE TABLET BY MOUTH DAILY  Patient taking differently: Take 20 mg by mouth daily  4/20/17   Andreas Fields MD   atorvastatin (LIPITOR) 40 MG tablet Take 1 tablet by mouth daily 1/10/17   Andreas Fields MD   lisinopril (PRINIVIL;ZESTRIL) 10 MG tablet TAKE ONE TABLET BY MOUTH DAILY  Patient taking differently: Take 10 mg by mouth daily TAKE ONE TABLET BY MOUTH DAILY 1/10/17   Andreas Fields MD   metoprolol succinate (TOPROL XL) 25 MG extended release tablet TAKE ONE TABLET BY MOUTH DAILY  Patient taking differently: Take 25 mg by mouth daily  12/16/16   Andreas Fields MD   Calcium Carbonate-Vitamin D (CALTRATE 600+D PO) Take 1 tablet by mouth 2 times daily. Historical Provider, MD   Multiple Vitamins-Minerals (CENTRUM SILVER ADULT 50+) TABS Take 1 tablet by mouth daily. Historical Provider, MD       REVIEW OF SYSTEMS    (2-9 systems for level 4, 10 or more for level 5)    Review of Systems   Constitutional: Positive for fatigue. Respiratory: Positive for chest tightness and shortness of breath. Negative for cough. Cardiovascular: Negative for chest pain, palpitations and leg swelling. Gastrointestinal: Negative for abdominal pain, nausea and vomiting. Genitourinary: Negative for flank pain. Musculoskeletal: Negative for back pain.    Neurological: Positive for weakness normal  Ectopy: none  Conduction: normal  ST Segments: no acute change  T Waves: no acute change  Q Waves: none    Clinical Impression: non-specific EKG    All EKG's are interpreted by the Emergency Department Physician whoeither signs or Co-signs this chart in the absence of a cardiologist.    EMERGENCY DEPARTMENT COURSE:  ED Course as of 04/19/22 1541   Tue Apr 19, 2022   1426 CBC with Auto Differential(!):    WBC 5.1   RBC 4.40   Hemoglobin Quant 12.2   Hematocrit 39.4   MCV 89.5   MCH 27.7   MCHC 31.0   RDW 13.6   Platelet Count 312   MPV 9.7   NRBC Automated 0.0   Seg Neutrophils 63   Lymphocytes 22(!)   Monocytes 11   Eosinophils % 3   Basophils 1   Immature Granulocytes 0   Segs Absolute 3.24   Absolute Lymph # 1.12   Absolute Mono # 0.55   Absolute Eos # 0.13   Basophils Absolute 0.04   Absolute Immature Granulocyte 0.02 [AO]   1426 XR CHEST 1 VIEW [AO]   1441 Protime-INR(!):    Prothrombin Time 25.1(!)   INR 2.3 [AO]   1441 APTT(!):    PTT 40.9(!) [AO]   1441 Troponin(!):    Troponin, High Sensitivity 26(!) [AO]   1441 Brain Natriuretic Peptide(!):    Pro-BNP 3,156(!) [AO]   1451 Comprehensive Metabolic Panel w/ Reflex to MG(!):    GLUCOSE, FASTING,GF 99   BUN,BUNPL 27(!)   Creatinine 0.90   Bun/Cre Ratio 30(!)   CALCIUM, SERUM, 531233 9.4   Sodium 134(!)   Potassium 4.9   Chloride 99   CO2 25   Anion Gap 10   Alk Phos 159(!)   ALT 16   AST 22   Bilirubin 0.25(!)   Total Protein 7.1   Albumin 4.0   GFR Non- 59(!)   GFR  >60   GFR Comment      [AO]   [address] [AO]      ED Course User Index  [AO] Ho Ellis DO       MDM  Number of Diagnoses or Management Options     Amount and/or Complexity of Data Reviewed  Clinical lab tests: ordered and reviewed  Tests in the radiology section of CPT®: ordered and reviewed  Review and summarize past medical records: yes  Independent visualization of images, tracings, or specimens: yes    Patient Progress  Patient progress: stable      PROCEDURES:  Procedures     CONSULTS:  IP CONSULT TO HOSPITALIST  IP CONSULT TO HEART FAILURE NURSE/COORDINATOR  IP CONSULT TO DIETITIAN    CRITICAL CARE:  NONE    FINAL IMPRESSION     1. Acute on chronic systolic congestive heart failure ([address]       DISPOSITION / PLAN   DISPOSITION      ADMIT    Alyson Emmanuel DO  Emergency Medicine Physician    (Please note that portions of this note were completed with a voice recognition program.  Efforts were made to edit the dictations but occasionally words are mis-transcribed.)        Shane Robbins 1721,   04/19/22 1666

## 2022-04-20 ENCOUNTER — APPOINTMENT (OUTPATIENT)
Dept: GENERAL RADIOLOGY | Age: 87
DRG: 291 | End: 2022-04-20
Payer: MEDICARE

## 2022-04-20 VITALS
TEMPERATURE: 98.4 F | BODY MASS INDEX: 31.45 KG/M2 | HEART RATE: 60 BPM | WEIGHT: 177.5 LBS | HEIGHT: 63 IN | OXYGEN SATURATION: 97 % | DIASTOLIC BLOOD PRESSURE: 49 MMHG | RESPIRATION RATE: 18 BRPM | SYSTOLIC BLOOD PRESSURE: 122 MMHG

## 2022-04-20 LAB
ABSOLUTE EOS #: 0.12 K/UL (ref 0–0.44)
ABSOLUTE IMMATURE GRANULOCYTE: 0.02 K/UL (ref 0–0.3)
ABSOLUTE LYMPH #: 0.99 K/UL (ref 1.1–3.7)
ABSOLUTE MONO #: 0.45 K/UL (ref 0.1–1.2)
BASOPHILS # BLD: 1 % (ref 0–2)
BASOPHILS ABSOLUTE: 0.05 K/UL (ref 0–0.2)
CHOLESTEROL/HDL RATIO: 3
CHOLESTEROL: 143 MG/DL
EKG ATRIAL RATE: 61 BPM
EKG Q-T INTERVAL: 454 MS
EKG QRS DURATION: 148 MS
EKG QTC CALCULATION (BAZETT): 454 MS
EKG R AXIS: 33 DEGREES
EKG T AXIS: 45 DEGREES
EKG VENTRICULAR RATE: 60 BPM
EOSINOPHILS RELATIVE PERCENT: 3 % (ref 1–4)
HCT VFR BLD CALC: 38.5 % (ref 36.3–47.1)
HDLC SERPL-MCNC: 48 MG/DL
HEMOGLOBIN: 11.8 G/DL (ref 11.9–15.1)
IMMATURE GRANULOCYTES: 1 %
INR BLD: 2.3
LDL CHOLESTEROL: 79 MG/DL (ref 0–130)
LYMPHOCYTES # BLD: 23 % (ref 24–43)
MAGNESIUM: 1.5 MG/DL (ref 1.6–2.6)
MCH RBC QN AUTO: 27.1 PG (ref 25.2–33.5)
MCHC RBC AUTO-ENTMCNC: 30.6 G/DL (ref 28.4–34.8)
MCV RBC AUTO: 88.3 FL (ref 82.6–102.9)
MONOCYTES # BLD: 11 % (ref 3–12)
NRBC AUTOMATED: 0 PER 100 WBC
PDW BLD-RTO: 13.3 % (ref 11.8–14.4)
PLATELET # BLD: 186 K/UL (ref 138–453)
PMV BLD AUTO: 9.9 FL (ref 8.1–13.5)
PRO-BNP: 2194 PG/ML
PROTHROMBIN TIME: 25.4 SEC (ref 11.5–14.2)
RBC # BLD: 4.36 M/UL (ref 3.95–5.11)
SEG NEUTROPHILS: 61 % (ref 36–65)
SEGMENTED NEUTROPHILS ABSOLUTE COUNT: 2.66 K/UL (ref 1.5–8.1)
TRIGL SERPL-MCNC: 82 MG/DL
TSH SERPL DL<=0.05 MIU/L-ACNC: 4.42 UIU/ML (ref 0.3–5)
WBC # BLD: 4.3 K/UL (ref 3.5–11.3)

## 2022-04-20 PROCEDURE — 36415 COLL VENOUS BLD VENIPUNCTURE: CPT

## 2022-04-20 PROCEDURE — 83735 ASSAY OF MAGNESIUM: CPT

## 2022-04-20 PROCEDURE — 97162 PT EVAL MOD COMPLEX 30 MIN: CPT

## 2022-04-20 PROCEDURE — 6370000000 HC RX 637 (ALT 250 FOR IP): Performed by: NURSE PRACTITIONER

## 2022-04-20 PROCEDURE — 97530 THERAPEUTIC ACTIVITIES: CPT

## 2022-04-20 PROCEDURE — 71045 X-RAY EXAM CHEST 1 VIEW: CPT

## 2022-04-20 PROCEDURE — 85610 PROTHROMBIN TIME: CPT

## 2022-04-20 PROCEDURE — 97166 OT EVAL MOD COMPLEX 45 MIN: CPT

## 2022-04-20 PROCEDURE — 6360000002 HC RX W HCPCS: Performed by: NURSE PRACTITIONER

## 2022-04-20 PROCEDURE — 85025 COMPLETE CBC W/AUTO DIFF WBC: CPT

## 2022-04-20 PROCEDURE — 97535 SELF CARE MNGMENT TRAINING: CPT

## 2022-04-20 PROCEDURE — 80061 LIPID PANEL: CPT

## 2022-04-20 PROCEDURE — 97116 GAIT TRAINING THERAPY: CPT

## 2022-04-20 PROCEDURE — 84443 ASSAY THYROID STIM HORMONE: CPT

## 2022-04-20 PROCEDURE — APPSS45 APP SPLIT SHARED TIME 31-45 MINUTES: Performed by: NURSE PRACTITIONER

## 2022-04-20 PROCEDURE — 99239 HOSP IP/OBS DSCHRG MGMT >30: CPT | Performed by: NURSE PRACTITIONER

## 2022-04-20 PROCEDURE — 83880 ASSAY OF NATRIURETIC PEPTIDE: CPT

## 2022-04-20 PROCEDURE — 97110 THERAPEUTIC EXERCISES: CPT

## 2022-04-20 PROCEDURE — 93010 ELECTROCARDIOGRAM REPORT: CPT | Performed by: INTERNAL MEDICINE

## 2022-04-20 RX ORDER — FUROSEMIDE 20 MG/1
40 TABLET ORAL DAILY
Qty: 60 TABLET | Refills: 3 | Status: SHIPPED | OUTPATIENT
Start: 2022-04-20

## 2022-04-20 RX ADMIN — MAGNESIUM SULFATE HEPTAHYDRATE 2000 MG: 40 INJECTION, SOLUTION INTRAVENOUS at 06:34

## 2022-04-20 RX ADMIN — METOPROLOL SUCCINATE 25 MG: 25 TABLET, EXTENDED RELEASE ORAL at 08:43

## 2022-04-20 RX ADMIN — PANTOPRAZOLE SODIUM 40 MG: 40 TABLET, DELAYED RELEASE ORAL at 05:41

## 2022-04-20 RX ADMIN — FUROSEMIDE 40 MG: 10 INJECTION, SOLUTION INTRAMUSCULAR; INTRAVENOUS at 08:43

## 2022-04-20 RX ADMIN — LISINOPRIL 10 MG: 10 TABLET ORAL at 08:43

## 2022-04-20 RX ADMIN — CITALOPRAM HYDROBROMIDE 20 MG: 20 TABLET ORAL at 08:43

## 2022-04-20 ASSESSMENT — ENCOUNTER SYMPTOMS
SINUS PRESSURE: 0
EYE DISCHARGE: 0
EYE REDNESS: 0
SHORTNESS OF BREATH: 0
ABDOMINAL PAIN: 0
COLOR CHANGE: 0
COUGH: 0
ABDOMINAL DISTENTION: 0

## 2022-04-20 ASSESSMENT — PAIN SCALES - GENERAL: PAINLEVEL_OUTOF10: 0

## 2022-04-20 NOTE — PROGRESS NOTES
Shortness of Breath   and is admitted to the hospital for the management of Acute on chronic diastolic CHF (congestive heart failure) ([facility] [address]    Patient reports to the emergency department with her son secondary to a 48-hour decline in functional status. Patient was recently hospitalized for shortness of breath and functional decline where she was diagnosed with acute on chronic diastolic heart failure exacerbation. Patient was diuresed and discharged on 20 mg of Lasix daily. Son reports that he believes she has been compliant with this medication. Emergency department evaluation was completed and chest x-ray shows increasing pulmonary edema and therefore ER would like her to be admitted for IV diuretics. SNF was evaluated and offered to the patient family during her last admission last week and this was refused. At this time patient's only complaint is general malaise and exertional dyspnea. She expressed that her respiratory status is not nearly as severe as her last admission however she feels as though she is getting weaker each day and this is similar to prior presentation prior to her last severe CHF exacerbation. Assessment   Performance deficits / Impairments: Decreased functional mobility ; Decreased ADL status; Decreased safe awareness;Decreased strength;Decreased endurance;Decreased high-level IADLs;Decreased fine motor control;Decreased posture;Decreased balance;Decreased coordination  Assessment: Pt would benefits from continued skilled OT services to increase I and safety during functional tasks to return [facility] at [facility] as able.   Prognosis: Good  Decision Making: Medium Complexity  REQUIRES OT FOLLOW-UP: Yes  Activity Tolerance  Activity Tolerance: Patient Tolerated treatment well  Activity Tolerance: good        Plan   Plan  Times per Week: 4-5x/wk 1x/day as chloe  Current Treatment Recommendations: Strengthening,ROM,Balance training,Functional mobility training,Endurance training,Safety education & training,Patient/Caregiver education & training,Equipment evaluation, education, & procurement,Positioning,Self-Care / ADL,Home management training     Restrictions  Restrictions/Precautions  Restrictions/Precautions: General Precautions,Fall Risk,Up as Tolerated  Position Activity Restriction  Other position/activity restrictions: Up with assist, telemetry, RUE IV, ALARMS    Subjective   General  Chart Reviewed: Yes  Patient assessed for rehabilitation services?: Yes  Family / Caregiver Present: No  Subjective  Subjective: Pt resting in bed, agreeable to OT eval  Comments / Details: Pt denies any current pain    Social/Functional History  Social/Functional History  Lives With: Son  Type of Home: House  Home Layout: One level (laundry on main)  Home Access: Stairs to enter with rails  Entrance Stairs - Number of Steps: 5  Entrance Stairs - Rails: Both  Bathroom Shower/Tub:  (walk in tub)  Bathroom Toilet: Standard (pt has vanity support near toilet)  Bathroom Equipment: Built-in shower seat,Grab bars in shower  Home Equipment: Walker, 4 wheeled,Cane,Wheelchair-manual  Has the patient had two or more falls in the past year or any fall with injury in the past year?: No (Pt denies)  Receives Help From: Family (Son & his GF are very supportive)  ADL Assistance: Independent  Homemaking Assistance: Needs assistance (Son or his GF complete I ADL's, pt does do her own laundry)  Homemaking Responsibilities: Yes  Ambulation Assistance: Independent  Transfer Assistance: Independent  Active : No  Patient's  Info:  Son  Occupation: Retired  Type of Occupation: [employer]  Leisure & Hobbies: puzzles, read, TV  IADL Comments: Question reliability of info pt reported as she is a poor historian, will need verified for accuracy       Objective   Vision Exceptions: Wears glasses for reading  Hearing: Exceptions to [facility]  Hearing Exceptions: Hard of hearing/hearing concerns;Bilateral hearing aid (pt states does not wear her aids)          Observation/Palpation  Posture: Fair  Edema: minor in Peter ankles       Balance  Sitting Balance: Stand by assistance  Standing Balance: Minimal assistance (x2 staff assist with RW)  Standing Balance  Time: standing ~ 4 min  Activity: functional mobility and ADLs  Functional Mobility  Functional - Mobility Device: Rolling Walker  Activity:  (bed to door, door to bathroom sink to bedside chair)  Assist Level: Minimal assistance (x2)  Functional Mobility Comments: Pt given Mod verbal cues for pacing self, upright posture, scanning environment, RW safety, staying with RW all to increase safety and reduce risk of falls. Toilet Transfers  Toilet Transfer: Unable to assess  Toilet Transfers Comments: Pt with no needs at this time. ADL  Feeding: Setup  Grooming: Setup;Contact guard assistance (to stand at sink with RW and complete oral care and face washing)  UE Bathing: Setup;Minimal assistance  LE Bathing: Setup; Moderate assistance  UE Dressing: Setup;Minimal assistance  LE Dressing: Setup; Moderate assistance  Toileting: Setup;Minimal assistance       Tone RUE  RUE Tone: Normotonic  Tone LUE  LUE Tone: Normotonic  Coordination  Movements Are Fluid And Coordinated: No  Coordination and Movement Description: Fine motor impairments;Decreased speed;Decreased accuracy; Right UE;Left UE  Activity Tolerance  Activity Tolerance: Patient limited by endurance  Bed mobility  Supine to Sit: Contact guard assistance  Sit to Supine: Unable to assess (Pt agreeable to sitting up in bedside chair at end of session)  Scooting: Contact guard assistance  Comment: Pt given Min verbal cues for pacing self, use of bedrails all to increase safety.        Transfers  Sit to stand: Minimal assistance;2 Person assistance (with RW)  Stand to sit: Minimal assistance;2 Person assistance  Transfer Comments: Pt given mod verbal cues for pacing self, RW safety, controlled stand to sit, squaring self/AD up to surface and reaching back prior to sitting, upright posture all to increase safety. Cognition  Overall Cognitive Status: Exceptions  Arousal/Alertness: Appropriate responses to stimuli  Following Commands: Follows multistep commands with increased time; Follows multistep commands with repitition  Attention Span: Appears intact  Safety Judgement: Decreased awareness of need for assistance;Decreased awareness of need for safety  Problem Solving: Decreased awareness of errors;Assistance required to identify errors made;Assistance required to correct errors made  Insights: Decreased awareness of deficits  Initiation: Requires cues for some  Sequencing: Requires cues for some        Sensation  Overall Sensation Status: WFL         LUE AROM (degrees)  LUE AROM : WFL  RUE AROM (degrees)  RUE AROM : WFL  LUE Strength  Gross LUE Strength: WFL  LUE Strength Comment: ~ 4-/5  RUE Strength  Gross RUE Strength: WFL  RUE Strength Comment: ~ 4-/5                    AM-PAC Score        AM-[facility] Inpatient Daily Activity Raw Score: 16 (04/20/22 1409)  AM-PAC Inpatient ADL T-Scale Score : 35.96 (04/20/22 1409)  ADL Inpatient CMS 0-100% Score: 53.32 (04/20/22 1409)  ADL Inpatient CMS G-Code Modifier : CK (04/20/22 1409)      Goals  Short Term Goals  Time Frame for Short term goals: By discharge, pt to demo  Short Term Goal 1: bed mobility to Mod I with use of bedrails as needed. Short Term Goal 2: toileting to SBA with use of AD/grab bars as needed. Short Term Goal 3: UB ADLs to Set up and LB ADLs to Min A with use of AD/AE as needed. Short Term Goal 4: increased B UE strength by 1/2 grade to assist with functional task/I with B UE HEP with use of handouts as needed. Short Term Goal 5: ADL transfers and functional mobility to SBA with use of AD as needed. Long Term Goals  Long Term Goal 1: Pt to be I with fall prevention education, EC/WS tech, recommendations for dishcharge/AE with use of handout as needed. Patient Goals   Patient goals :  To go home!       Therapy Time   Individual Concurrent Group Co-treatment   Time In 1000 (Plus 10 min for chart review/RN communication)         Time Out 1031         Minutes 31+ 10 = 41           tx time: 25 min       Kenrick Anand OT

## 2022-04-20 NOTE — CARE COORDINATION
Case Management Initial Discharge Plan  Sushila León,             Met with:patient to discuss discharge plans. Information verified: address, contacts, phone number, , insurance Yes  PCP: TASHA Schwab CNP  Date of last visit: Few Months Ago; Call to PCP 22    Insurance Provider: [facility] Medicare    Discharge Planning    Living Arrangements: Son     Support Systems: Children      Lives in [place] [address]  5 stairs to climb to get into front door, 0 stairs to climb to reach second floor  Location of bedroom/bathroom in home Main Floor    Patient able to perform ADL's:Independent with walker    Current Services (outpatient & in home) None  DME equipment: Kunal Canchola W/DELVIN, & Laisha Walker  DME provider: N/A    Pharmacy: [facility] Potential Assistance Needed:       Patient agreeable to home care: No  Como of choice provided:  n/a    Prior SNF/Rehab Placement and Facility: Yes, [facility]  Agreeable to SNF/Rehab: No  Como of choice provided: n/a   Evaluation: n/a    Expected Discharge date:     Patient expects to be discharged to: Follow Up Appointment: Best Day/ Time:      Transportation provider: Children  Transportation arrangements needed for discharge: No    Readmission Risk              Risk of Unplanned Readmission:  15             Does patient have a readmission risk score greater than 14?: No  If yes, follow-up appointment must be made within 7 days of discharge. Goal of Care:       Discharge Plan:   Met with patient at bedside to discuss d/c plans. Patient is admitted with a/c diastolic CHF. Room Air. IV Lasix  Coumadin is a home medication. Patient follows with Dr. Taty Galarza office. Lives with son, Maritza Hargrove. Independent with walker. Children assist with patient needs. Patient denies any HHC/DME needs at d/c.  Spoke with son, Kayy Garcia, to confirm and he agreed there are no needs at d/c. Continue to follow.      *ACP completed last admission 4/13/22-4/14/22      Electronically signed by Violeta Burton RN on 4/20/22 at 11:51 AM EDT

## 2022-04-20 NOTE — PROGRESS NOTES
CLINICAL PHARMACY NOTE: MEDS TO BEDS    Total # of Prescriptions Filled: 1   The following medications were delivered to the patient:  · Furosemide 20 mg tabs    Additional Documentation:  The pt's family picked the med up at the pharmacy, as the pt did not have money with her on the floor.

## 2022-04-20 NOTE — PLAN OF CARE
Problem: Falls - Risk of:  Goal: Will remain free from falls  Description: Will remain free from falls  4/20/2022 0847 by Agustin Jackson RN  Outcome: Progressing  Note: Siderails up x 2  Hourly rounding  Call light in reach  Instructed to call for assist before attempting out of bed.   Remains free from falls and accidental injury at this time   Floor free from obstacles  Bed is locked and in lowest position  Adequate lighting provided  Bed alarm on, Red Falling star sign posted      4/20/2022 9343 by Radha Henriquez RN  Outcome: Ongoing

## 2022-04-20 NOTE — PROGRESS NOTES
Physical Therapy  Facility/Department: [facility] PROGRESSIVE CARE  Physical Therapy Initial Assessment    Name: Marcel Arredondo  : 1931  MRN: [id number]  Date of Service: 2022    Discharge Recommendations:  Patient would benefit from continued therapy after discharge       Pt presented to ED on 22 with shortness of breath, worse with exertion, & had generalized fatigue and weakness. Pt has past medical history of coronary artery disease, congestive heart failure recently admitted for pulmonary edema & experiencing similar symptoms. She is overall hard of hearing and poor historian, history primarily provided by family with her  Pt admitted to the hospital for the management of Acute on chronic diastolic CHF       RN reports patient is medically stable for therapy treatment this date. Chart reviewed prior to treatment and patient is agreeable for therapy. Patient Diagnosis(es): The encounter diagnosis was Acute on chronic systolic congestive heart failure ([facility] [address] Past Medical History:  has a past medical history of Antral ulcer, acute, Arthritis, CAD (coronary artery disease), GERD (gastroesophageal reflux disease), Hyperlipidemia, and Hypertension. Past Surgical History:  has a past surgical history that includes Colonoscopy; hernia repair (2012); joint replacement; Tonsillectomy; Dental surgery; back surgery; Upper gastrointestinal endoscopy; Colon surgery; Carotid endarterectomy (Right); Upper gastrointestinal endoscopy (2017); hernia repair (2013); pr egd transoral biopsy single/multiple (N/A, 2017); Upper gastrointestinal endoscopy (N/A, 2018); Upper gastrointestinal endoscopy (2018); Dental surgery (2018); and Aortic valve repair. Assessment   Body Structures, Functions, Activity Limitations Requiring Skilled Therapeutic Intervention: Decreased functional mobility ; Decreased ADL status; Decreased strength;Decreased safe awareness;Decreased endurance;Decreased balance;Decreased posture  Assessment: Pt with deficits of bed mobility, transfers, ambulation, balance, safety awareness and endurance this session, & is decline compared to prior level of function. With current deficits, Pt requires continued PT to maximize independence with functional mobility, balance, safety awareness & activity tolerance to improve overall tolerance of I ADL's. Due to recent hospitalization and medical condition, pt would benefit from additional therapy at time of discharge to ensure safety. Please refer to the AM-PAC score for current functional status. Therapy Prognosis: Good  Decision Making: Medium Complexity  Exam: ROM, MMT, functional mobility, activity tolerance, Balance, & [address] [facility] [address] [address] AM-[facility] 6 Clicks Basic Mobility  Clinical Presentation: evolving  Requires PT Follow-Up: Yes  Activity Tolerance  Activity Tolerance: Patient limited by endurance     Plan   Plan  Plan:  (1-2x/D, 5-6D/week)  Current Treatment Recommendations: Strengthening,Balance training,Functional mobility training,Transfer training,Endurance training,Gait training,Home exercise program,Safety education & training,Positioning,Patient/Caregiver education & training  Plan Comment: 1-2x/D, 5-6d/week  Safety Devices  Type of Devices: Chair alarm in place,Gait belt,Call light within reach,Left in chair,Patient at risk for falls (PT in room at end of session)     Restrictions  Restrictions/Precautions  Restrictions/Precautions: General Precautions,Fall Risk,Up as Tolerated  Position Activity Restriction  Other position/activity restrictions:  Up with assist, telemetry, RUE IV, ALARMS     Subjective   General  Chart Reviewed: Yes  Patient assessed for rehabilitation services?: Yes  Response To Previous Treatment: Not applicable  General Comment  Comments: RN okays PT  Subjective  Subjective: Pt agreeable to PT         Social/Functional History  Social/Functional History  Lives With: Son  Type of Home: House  Home Layout: One level (laundry on main)  Home Access: Stairs to enter with rails  Entrance Stairs - Number of Steps: 5  Entrance Stairs - Rails: Both  Bathroom Shower/Tub:  (walk in tub)  Bathroom Toilet: Standard (pt has vanity support near toilet)  Bathroom Equipment: Built-in shower seat,Grab bars in shower  Home Equipment: Karyn Garnica, 4 wheeled,Cane,Wheelchair-manual  Has the patient had two or more falls in the past year or any fall with injury in the past year?: No (Pt denies)  Receives Help From: Family (Son & his GF are very supportive)  ADL Assistance: Independent  Homemaking Assistance: Needs assistance (Son or his GF complete I ADL's, pt does do her own laundry)  Homemaking Responsibilities: Yes  Ambulation Assistance: Independent  Transfer Assistance: Independent  Active : No  Patient's  Info: Son  Occupation: Retired  Type of Occupation: [employer]  Leisure & Hobbies: puzzles, read, TV  IADL Comments: Question reliability of info pt reported as she is a poor historian, will need verified for accuracy  Vision/Hearing  Vision Exceptions: Wears glasses for reading  Hearing: Exceptions to [facility]  Hearing Exceptions: Hard of hearing/hearing concerns;Bilateral hearing aid (pt states does not wear her aids)    Cognition   Orientation  Orientation Level: Disoriented to situation;Oriented to person;Disoriented to place; Disoriented to time (pt knew she's in hospital but not name)     Objective      Observation/Palpation  Posture: Fair  Edema: minor in Peter ankles  Gross Assessment  Sensation: Intact (Pt denies any paresthesias in hands/feet)     AROM RLE (degrees)  RLE AROM: WFL  AROM LLE (degrees)  LLE General AROM: limited at ankle  AROM RUE (degrees)  RUE General AROM: see OT assessment  AROM LUE (degrees)  LUE General AROM: s  Strength RLE  Comment: 4/5 hip  Strength LLE  Comment: 4/5 hip, 3-/5 ankle  Strength RUE  Comment: see OT assessment  Strength LUE  Comment: see OT assessment  Tone RLE  RLE Tone: Normotonic  Tone LLE  LLE Tone: Normotonic        Bed mobility  Supine to Sit: Contact guard assistance  Sit to Supine: Unable to assess (Pt agreeable to sitting up in bedside chair at end of session)  Scooting: Contact guard assistance  Comment: MIN cues for hand placement on bed rail as needed, pacing & pursed lip breathing tech, and use of BUE's to scoot fully out to EOB as well as awareness/assist with lines to increase safety.   Transfers  Sit to Stand: Minimal Assistance;2 Person Assistance  Stand to sit: Minimal Assistance;2 Person Assistance  Bed to Chair: Minimal assistance;2 Person Assistance  Stand Pivot Transfers: Minimal Assistance;2 Person Assistance  Lateral Transfers: Minimal Assistance;2 Person Assistance  Comment: MOD VC + tactile assist on correct use of upper body for safe sit/stand + to back all way back to surface with walker until she feels touch behind legs & to ensure she reaches with UB support to arms of chair  Ambulation  Surface: level tile  Device: Rolling Walker  Assistance: Minimal assistance;2 Person assistance  Quality of Gait: step to pattern, MOD cues to keep walker close at all times AND to amb inside base of walker & upright posture for safety/scanning  Gait Deviations: Decreased step height;Decreased step length  Distance: 25ft, 20ft  Comments: Pt amb to BR & stood at sink for oral & face hygiene x 4 minutes then ambulated 20 ft more ft with R/walker & sat to chair with 2 Min Assistance     Balance  Posture: Fair  Sitting - Static: Good  Sitting - Dynamic: Good;-  Standing - Static: Good (R/W)  Standing - Dynamic: Fair;+ (R/W)  Single Leg Stance R Le (at R/W)  Single Leg Stance L Le (at R/W)  A/AROM Exercises: Pt performed seated LE ex's x 10 reps to promote mobility exercises and promote joint congruency  Circulation/Endurance Exercises: ankle pumps x 20  Pressure Relief Exercises: 10 supine & seated   Core strengthening:  Pt completed seated lateral WS seated, static standing weight shifts & picking feet up off floor with UB support at R/walker to improve core strength & stability        OutComes Score                                                  AM-PAC Score  AM-PAC Inpatient Mobility Raw Score : 17 (04/20/22 1050)  AM-PAC Inpatient T-Scale Score : 42.13 (04/20/22 1050)  Mobility Inpatient CMS 0-100% Score: 50.57 (04/20/22 1050)  Mobility Inpatient CMS G-Code Modifier : CK (04/20/22 1050)          Goals  Short Term Goals  Time Frame for Short term goals: 12 visits  Short term goal 1: Inc bed-mobility & transfers to independent to enable pt to safely get in/OOB & chair  Short term goal 2: Inc gait to amb 150ft or > indep w/ RW to enable pt to return to previous level of independence & able to demonstrate indep/ safe use of RW in functional activities including bending/ reaching, approaching counters and turning to sit. Short term goal 3:  Inc strength to [address] standing balance to good with device to facilitate pt independence for performance of ADL's & functional mobility, & reduce fall risk  Short term goal 4: Pt able to tolerate 30-40 min of activity to include 15-20 reps of ex, NMR & functional mobility with device to facilitate activity tolerance to [facility]  Short term goal 5: Ed pt on home ex's, safety & energy principles, CHF education including CHF symptom tracker & self check plan worksheet, fall prevention, & issue written pt education       Therapy Time   Individual Concurrent Group Co-treatment   Time In 0959         Time Out 1050         Minutes 51+10=61              Treatment time: 46 minutes      MARY MORALES, PT

## 2022-04-20 NOTE — PLAN OF CARE
Problem: Falls - Risk of:  Goal: Will remain free from falls  Description: Will remain free from falls  4/20/2022 0353 by Lorenza Calles RN  Outcome: Ongoing  4/19/2022 1732 by Isidro Guevara RN  Outcome: Ongoing  Note: Siderails up x 2  Hourly rounding  Call light in reach  Instructed to call for assist before attempting out of bed.   Remains free from falls and accidental injury at this time   Floor free from obstacles  Bed is locked and in lowest position  Adequate lighting provided  Bed alarm on, Red Falling star and Stay with Me signs posted

## 2022-04-20 NOTE — FLOWSHEET NOTE
[facility]  PROGRESS NOTE    Room # 1011/1011-02   Name: Suzanne Torres              Reason for visit: Routine    I visited the patient. Admit Date & Time: 4/19/2022  1:42 PM    Assessment:  Suzanne Torres is a 80 y.o. female. Upon entering the room patient states better. Patient hard of hearing, understanding. Intervention:   provided a ministry presence and brief prayer. Outcome:  Patient grateful    Plan:  Chaplains will remain available to offer spiritual and emotional support as needed. Electronically signed by Chaplain Jojo, on 4/19/2022 at 8:49 PM.  Rafa       04/19/22 2048   Encounter Summary   Services provided to: Patient   Referral/Consult From: Antony   Continue Visiting   (4-19-22)   Complexity of Encounter Low   Length of Encounter 15 minutes   Spiritual Assessment Completed Yes   Routine   Type Initial   Assessment Passive   Intervention Prayer   Outcome Expressed gratitude

## 2022-04-20 NOTE — PROGRESS NOTES
Nutrition Education    · Educated on Low Sodium Nutrition Therapy  · Learners: Patient  · Readiness: Acceptance  · Method: Handout  · Response: Needs Reinforcement  · Contact name and number provided.         Jersey PALOMARESN, RDN, LDN  Lead Clinical Dietitian  RD Office Phone [phone]

## 2022-04-20 NOTE — PROGRESS NOTES
[facility]  Office: [address], DO, Arturo King, DO, Guadalupe Whitten, DO, Danielle Salgado Blood, DO, Tammy Delgado MD, Roberto Valenzuela MD, Eva Beck MD, Daniela Huggins MD, Tabby Pat MD, Johsua Huynh MD, Roselia Cooper MD, Arthur Whipple, DO, Donna Correa, DO, Marie Rivera MD,  Digna Gomez, DO, Javed Montoya MD, Perez Solis MD, Festus Ortega MD, Denia Castaneda, DO, Tiffany Purcell MD, Odilia Santana MD, Josué Sandhu, CNP, [facility] Angélica, CNP, Kristin Rogers, CNP, Mikhail Laguerre, CNP, Nithin Zhang, CNP, Kalyani Cihng, CNP, Leonardo Almeida PA-C, Nehemiah Vernon, DNP, Renetta Oliver, CNP, Sameer Schumacher, CNP, Pierre Chapa, CNP, Alondra Mendenhall, CNS, Andreas Neville, DNP, Emilie Dietz, CNP, Estrellita Chau, CNP, Shy Braswell, CNP         [facility]    Progress Note    4/20/2022    11:41 AM    Name:   Mila Wilkinson  MRN:     [id number]     Acct:      [de-identified]   Room:   [facility]/1011-02   Day:  1  Admit Date:  4/19/2022  1:42 PM    PCP:   TASHA Andrade CNP  Code Status:  Full Code    Subjective:     C/C:   Chief Complaint   Patient presents with    Shortness of Breath     Interval History Status: improved. Patient has interval improvement of shortness of breath and work of breathing. She states she was able to sit up to the edge of better and feels drastically better today in terms of her respiratory status and generalized weakness. Plan to discharge later this afternoon barring any changes. Dietary restrictions are discussed at length including oral fluid restriction of less than 2500 mL. Also advised to dramatically reduce her salt intake.   Patient endorses that she had Easter Sunday dinner with her family and she does not believe she ate large amounts of sodium however she endorses she was eating many foods that were not in her regular diet including ham.    Brief History:     4/19 -Patient reports to the emergency department with her son secondary to a 48-hour decline in functional status. Patient was recently hospitalized for shortness of breath and functional decline where she was diagnosed with acute on chronic diastolic heart failure exacerbation. Patient was diuresed and discharged on 20 mg of Lasix daily. Son reports that he believes she has been compliant with this medication. Emergency department evaluation was completed and chest x-ray shows increasing pulmonary edema and therefore ER would like her to be admitted for IV diuretics. SNF was evaluated and offered to the patient family during her last admission last week and this was refused. At this time patient's only complaint is general malaise and exertional dyspnea. She expressed that her respiratory status is not nearly as severe as her last admission however she feels as though she is getting weaker each day and this is similar to prior presentation prior to her last severe CHF exacerbation. 4/20 - Patient reports improvement of shortness of breath, she has improved work of breathing and is oxygenating well on room air. CXR shows chronic changes with trace effusions. BNP is 2,194. Review of Systems:     Review of Systems   Constitutional: Negative for chills and fever. HENT: Negative for congestion and sinus pressure. Eyes: Negative for discharge and redness. Respiratory: Negative for cough and shortness of breath. Cardiovascular: Negative for chest pain and palpitations. Gastrointestinal: Negative for abdominal distention and abdominal pain. Genitourinary: Negative for difficulty urinating and dysuria. Musculoskeletal: Negative for joint swelling and myalgias. Skin: Negative for color change and rash. Allergic/Immunologic: Negative for immunocompromised state. Neurological: Negative for dizziness and light-headedness. Hematological: Negative for adenopathy. Does not bruise/bleed easily. Psychiatric/Behavioral: Negative for agitation and confusion. Medications: Allergies:  No Known Allergies    Current Meds:   Scheduled Meds:    atorvastatin  40 mg Oral Daily    citalopram  20 mg Oral Daily    lisinopril  10 mg Oral Daily    metoprolol succinate  25 mg Oral Daily    pantoprazole  40 mg Oral QAM AC    [START ON 2022] warfarin  6 mg Oral Once per day on     sodium chloride flush  5-40 mL IntraVENous 2 times per day    furosemide  40 mg IntraVENous BID    warfarin placeholder: dosing by pharmacy   Other RX Placeholder    warfarin  4 mg Oral Once per day on Sun Tue Wed Thu Sat     Continuous Infusions:    sodium chloride       PRN Meds: sodium chloride flush, sodium chloride, ondansetron **OR** ondansetron, polyethylene glycol, acetaminophen **OR** acetaminophen, perflutren lipid microspheres, potassium chloride **OR** potassium alternative oral replacement **OR** potassium chloride, magnesium sulfate    Data:     Past Medical History:   has a past medical history of Antral ulcer, acute, Arthritis, CAD (coronary artery disease), GERD (gastroesophageal reflux disease), Hyperlipidemia, and Hypertension. Social History:   reports that she has never smoked. She has never used smokeless tobacco. She reports that she does not drink alcohol and does not use drugs. Family History:   Family History   Problem Relation Age of Onset    Heart Disease Mother     Heart Disease Sister     Heart Disease Brother        Vitals:  /62   Pulse 59   Temp 97.9 °F (36.6 °C) (Oral)   Resp 16   Ht 5' 3\" (1.6 m)   Wt 177 lb 8 oz (80.5 kg)   SpO2 96%   BMI 31.44 kg/m²   Temp (24hrs), Av.9 °F (36.6 °C), Min:97.7 °F (36.5 °C), Max:98.2 °F (36.8 °C)    No results for input(s): POCGLU in the last 72 hours. I/O (24Hr):     Intake/Output Summary (Last 24 hours) at 2022 1141  Last data filed at 2022 1124  Gross per 24 hour   Intake --   Output 750 ml   Net -750 ml Labs:  Hematology:  Recent Labs     04/19/22  1415 04/20/22  0537   WBC 5.1 4.3   RBC 4.40 4.36   HGB 12.2 11.8*   HCT 39.4 38.5   MCV 89.5 88.3   MCH 27.7 27.1   MCHC 31.0 30.6   RDW 13.6 13.3    186   MPV 9.7 9.9   INR 2.3 2.3     Chemistry:  Recent Labs     04/18/22  0930 04/19/22  1415 04/19/22  1652 04/20/22  0537    134*  --   --    K 4.5 4.9  --   --     99  --   --    CO2 24 25  --   --    GLUCOSE 102* 99  --   --    BUN 30* 27*  --   --    CREATININE 0.92* 0.90  --   --    MG 1.5*  --   --  1.5*   ANIONGAP 12 10  --   --    LABGLOM 57* 59*  --   --    GFRAA >60 >60  --   --    CALCIUM 9.7 9.4  --   --    PROBNP  --  3,156*  --  2,194*   TROPHS  --  26* 26*  --      Recent Labs     04/19/22  1415 04/20/22  0537   PROT 7.1  --    LABALBU 4.0  --    TSH  --  4.42   AST 22  --    ALT 16  --    ALKPHOS 159*  --    BILITOT 0.25*  --    CHOL  --  143   HDL  --  48   LDLCHOLESTEROL  --  79   CHOLHDLRATIO  --  3.0   TRIG  --  82     ABG:No results found for: POCPH, PHART, PH, POCPCO2, QRV4HNF, PCO2, POCPO2, PO2ART, PO2, POCHCO3, VLS3EUI, HCO3, NBEA, PBEA, BEART, BE, THGBART, THB, AQW8VHO, QGYQ6ZMX, E2EQEKKI, O2SAT, FIO2  Lab Results   Component Value Date/Time    SPECIAL NOT REPORTED 09/16/2019 04:35 PM     Lab Results   Component Value Date/Time    CULTURE ENTEROBACTER CLOACAE 50 to 100,000 CFU/ML (A) 09/16/2019 04:35 PM    CULTURE (A) 09/16/2019 04:35 PM     ESCHERICHIA COLI 10 to 50,000 CFU/ML THIS ORGANISM IS AN EXTENDED-SPECTRUM BETA-LACTAMASE  AND RESISTANCE TO THERAPY WITH PENICILLINS, CEPHALOSPORINS AND AZTREONAM IS EXPECTED. THESE ORGANISMS GENERALLY REMAIN SUSCEPTIBLE TO CARBAPENEMS. CONSIDER ID CONSULTATION. Radiology:  XR CHEST PORTABLE    Result Date: 4/20/2022  Cardiomegaly and chronic pulmonary change with trace effusions.      XR CHEST 1 VIEW    Result Date: 4/19/2022  Mild pulmonary vascular congestion       Physical Examination:        Physical Exam  Constitutional:       General: She is not in acute distress. Appearance: She is not ill-appearing. HENT:      Head: Normocephalic and atraumatic. Nose: Nose normal. No congestion. Mouth/Throat:      Mouth: Mucous membranes are moist.      Pharynx: Oropharynx is clear. Eyes:      General:         Right eye: No discharge. Left eye: No discharge. Extraocular Movements: Extraocular movements intact. Pupils: Pupils are equal, round, and reactive to light. Cardiovascular:      Rate and Rhythm: Normal rate and regular rhythm. Pulses: Normal pulses. Heart sounds: Normal heart sounds. No murmur heard. No friction rub. No gallop. Pulmonary:      Effort: Pulmonary effort is normal. No respiratory distress. Breath sounds: Examination of the right-lower field reveals decreased breath sounds. Examination of the left-lower field reveals decreased breath sounds. Decreased breath sounds present. No rales. Abdominal:      General: Bowel sounds are normal. There is no distension. Palpations: Abdomen is soft. Tenderness: There is no abdominal tenderness. Musculoskeletal:         General: No swelling or tenderness. Normal range of motion. Cervical back: Normal range of motion and neck supple. No rigidity or tenderness. Skin:     General: Skin is warm and dry. Capillary Refill: Capillary refill takes less than 2 seconds. Coloration: Skin is not jaundiced. Findings: No rash. Neurological:      General: No focal deficit present. Mental Status: She is alert. Motor: Weakness present.    Psychiatric:         Mood and Affect: Mood normal.         Behavior: Behavior normal.         Assessment:        Hospital Problems           Last Modified POA    * (Principal) Acute on chronic diastolic CHF (congestive heart failure) ([facility] 4/19/2022 Yes    Pulmonary hypertension ([facility] 4/19/2022 Yes    Atrial fibrillation ([facility] 4/19/2022 Yes Hypertension 4/19/2022 Yes    Mixed hyperlipidemia 4/19/2022 Yes    CAD (coronary artery disease) (Chronic) 4/19/2022 Yes    Pulmonary edema cardiac cause ([address] 4/19/2022 Yes          Plan:        Acute on chronic diastolic heart failure exacerbation with pulmonary hypertension with pulmonary edema of cardiac cause  Continue Lasix 40 mg twice daily for now    Probable discharge this afternoon   PT/OT, await recommendations, patient refused SNF 1 week ago and plans to be discharged home where she lives semi-independently with her son. Discussed disease process and diet at length with patient who endorses understanding of fluid and sodium restriction.    Atrial fibrillation on chronic anticoagulation  Continue lisinopril and metoprolol  Continue Coumadin with a goal INR of 2-3   Hypertension with mixed hyperlipidemia  Continue lisinopril, metoprolol  Continue Lipitor  CAD  As above, continue home regimen     TASHA Vela NP  4/20/2022  11:41 AM

## 2022-04-20 NOTE — DISCHARGE SUMMARY
[facility]  Office: [address], DO, Marcelo Breauxsar, DO, Joe Narrow, DO, Cassie Tuttle Blood, DO, Vidya Olmstead MD, Ronny Biggs MD, Kevin Covington MD, Leonila Clay MD, Glen Gross MD, Johnnie Cerrato MD, Veronica Lamb MD, Gerry Stout, DO, Hardy Zimmerman, DO, Henry Martinez MD,  Erin Jean, DO, Jyoti Hernandez MD, Laquita Bartlett MD, Partha Montgomery MD, Lj Mansfield, DO, Quintin Almanzar MD, Rupal Merritt MD, Vinh Jones, [facility], [facility], CNP, Peter Umana, CNP, Augusto Ko, CNP, Jackson Collier, CNP, Waqar Valentine, CNP, Joss Mo PA-C, Sunita Son, [facility], Blanca North, CNP, Nilsa Judd, CNP, Terri Cole, CNP, Laura Councilman, CNS, Jane Rodas, [facility], Matthew Cazares, CNP, Angelo Mcgowan, CNP, Catalino Tripp, [facility]    Discharge Summary     Patient ID: Huma Paez  :  1931   MRN: [id number]     ACCOUNT:  [de-identified]   Patient's PCP: TASHA Saldana CNP  Admit Date: 2022   Discharge Date: 2022     Length of Stay: 1  Code Status:  Full Code  Admitting Physician: Kevin Covington MD  Discharge Physician: TASHA Yu NP     Active Discharge Diagnoses:     Hospital Problem Lists:  Principal Problem:    Acute on chronic diastolic CHF (congestive heart failure) ([facility]  Active Problems:    Pulmonary hypertension ([facility]    Atrial fibrillation ([address]    Hypertension    Mixed hyperlipidemia    CAD (coronary artery disease)    Pulmonary edema cardiac cause ([facility]  Resolved Problems:    * No resolved hospital problems.  *      Admission Condition:  fair     Discharged Condition: good    Hospital Stay:     Hospital Course:  Huma Paez is a 80 y.o. female who was admitted for the management of  Acute on chronic diastolic CHF (congestive heart failure) ([facility] , presented to ER with Shortness of Breath     -Patient reports to the emergency department with her son secondary to a 48-hour decline in functional status.  Patient was recently hospitalized for shortness of breath and functional decline where she was diagnosed with acute on chronic diastolic heart failure exacerbation.  Patient was diuresed and discharged on 20 mg of Lasix daily.  Son reports that he believes she has been compliant with this medication.  Emergency department evaluation was completed and chest x-ray shows increasing pulmonary edema and therefore ER would like her to be admitted for IV diuretics.  SNF was evaluated and offered to the patient family during her last admission last week and this was refused.  At this time patient's only complaint is general malaise and exertional dyspnea.  She expressed that her respiratory status is not nearly as severe as her last admission however she feels as though she is getting weaker each day and this is similar to prior presentation prior to her last severe CHF exacerbation.       4/20 - Patient reports improvement of shortness of breath, she has improved work of breathing and is oxygenating well on room air. CXR shows chronic changes with trace effusions. BNP is 2,194.        Significant therapeutic interventions: Increased dieretics     Significant Diagnostic Studies:   Labs / Micro:  CBC:   Lab Results   Component Value Date    WBC 4.3 04/20/2022    RBC 4.36 04/20/2022    HGB 11.8 04/20/2022    HCT 38.5 04/20/2022    MCV 88.3 04/20/2022    MCH 27.1 04/20/2022    MCHC 30.6 04/20/2022    RDW 13.3 04/20/2022     04/20/2022     BMP:    Lab Results   Component Value Date    GLUCOSE 99 04/19/2022     04/19/2022    K 4.9 04/19/2022    CL 99 04/19/2022    CO2 25 04/19/2022    ANIONGAP 10 04/19/2022    BUN 27 04/19/2022    CREATININE 0.90 04/19/2022    BUNCRER 30 04/19/2022    CALCIUM 9.4 04/19/2022    LABGLOM 59 04/19/2022    GFRAA >60 04/19/2022    GFR      04/19/2022     U/A:    Lab Results   Component Value Date    COLORU Yellow 04/13/2022    TURBIDITY SLIGHTLY CLOUDY 04/13/2022    SPECGRAV 1.020 04/13/2022    HGBUR NEGATIVE 04/13/2022    PHUR 6.0 04/13/2022    PROTEINU NEGATIVE 04/13/2022    GLUCOSEU NEGATIVE 04/13/2022    KETUA NEGATIVE 04/13/2022    BILIRUBINUR NEGATIVE 04/13/2022    UROBILINOGEN Normal 04/13/2022    NITRU NEGATIVE 04/13/2022    LEUKOCYTESUR NEGATIVE 04/13/2022        Radiology:  XR CHEST PORTABLE    Result Date: 4/20/2022  Cardiomegaly and chronic pulmonary change with trace effusions. XR CHEST 1 VIEW    Result Date: 4/19/2022  Mild pulmonary vascular congestion       Consultations:    Consults:     Final Specialist Recommendations/Findings:   IP CONSULT TO HOSPITALIST  IP CONSULT TO HEART FAILURE NURSE/COORDINATOR  IP CONSULT TO DIETITIAN  IP CONSULT TO PHARMACY      The patient was seen and examined on day of discharge and this discharge summary is in conjunction with any daily progress note from day of discharge. Discharge plan:     Disposition: Home    Physician Follow Up:     [facility] [address] [address]  [phone]    hospital follow    Jean-Paul Anna, TASHA - CNP  [address] [address]  [phone]    In 2 weeks           Requiring Further Evaluation/Follow Up POST HOSPITALIZATION/Incidental Findings: NA    Diet: regular diet - limit sodium and fluid as below. Activity: As tolerated, engage with PT/OT, Limit your sodium intake to less than 3 grams and ideally as low as 2 grams. Limit your fluid intake to 2500ml per day    Instructions to Patient: As tolerated, engage with PT/OT, Limit your sodium intake to less than 3 grams and ideally as low as 2 grams. Limit your fluid intake to 2500ml per day    Discharge Medications:      Medication List      CHANGE how you take these medications    citalopram 20 MG tablet  Commonly known as: CELEXA  TAKE ONE TABLET BY MOUTH DAILY  What changed: See the new instructions.      furosemide 20 MG tablet  Commonly known as: LASIX  Take 2 tablets by mouth daily  What changed: how much to take     lisinopril 10 MG tablet  Commonly known as: PRINIVIL;ZESTRIL  TAKE ONE TABLET BY MOUTH DAILY  What changed:   how much to take  how to take this  when to take this     metoprolol succinate 25 MG extended release tablet  Commonly known as: TOPROL XL  TAKE ONE TABLET BY MOUTH DAILY  What changed: See the new instructions. pantoprazole 40 MG tablet  Commonly known as: PROTONIX  TAKE ONE TABLET BY MOUTH EVERY MORNING BEFORE BREAKFAST  What changed: See the new instructions. CONTINUE taking these medications    atorvastatin 40 MG tablet  Commonly known as: LIPITOR  Take 1 tablet by mouth daily     CALTRATE 600+D PO     Centrum Silver Adult 50+ Tabs     Tylenol 8 Hour Arthritis Pain 650 MG extended release tablet  Generic drug: acetaminophen     * warfarin 2 MG tablet  Commonly known as: COUMADIN     * warfarin 2 MG tablet  Commonly known as: COUMADIN         * This list has 2 medication(s) that are the same as other medications prescribed for you. Read the directions carefully, and ask your doctor or other care provider to review them with you. Where to Get Your Medications      These medications [facility] to [facility], [address] [place] [address] [phone] [facility] [phone]  [address] [place] [address], [address] [place] [address]    Phone: [phone]   furosemide 20 MG tablet         No discharge procedures on file. Time Spent on discharge is  36 mins in patient examination, evaluation, counseling as well as medication reconciliation, prescriptions for required medications, discharge plan and follow up. Electronically signed by   TASHA Schmidt NP  4/20/2022  2:25 PM      Thank you TASHA Graf - GI for the opportunity to be involved in this patient's care.

## 2022-04-20 NOTE — PROGRESS NOTES
Discharge teaching and instructions completed with patient and son Tyra Marin. AVS and prescriptions reviewed. Patient verbalized understanding follow up and care planning. IV and telemetry removed. Lasix picked up from outpatient [facility].

## 2022-06-06 ENCOUNTER — HOSPITAL ENCOUNTER (OUTPATIENT)
Age: 87
Setting detail: SPECIMEN
Discharge: HOME | End: 2022-06-06

## 2022-06-06 LAB
POC INR: 2.5
PROTHROMBIN TIME, POC: 30 SEC (ref 9.6–14.4)

## 2022-07-07 ENCOUNTER — HOSPITAL ENCOUNTER (OUTPATIENT)
Age: 87
Setting detail: SPECIMEN
Discharge: HOME | End: 2022-07-07

## 2022-07-07 LAB
POC INR: 3.9
PROTHROMBIN TIME, POC: 46.9 SEC (ref 9.6–14.4)

## 2022-08-02 ENCOUNTER — HOSPITAL ENCOUNTER (OUTPATIENT)
Age: 87
Setting detail: SPECIMEN
Discharge: HOME | End: 2022-08-02

## 2022-08-02 LAB
POC INR: 3.4
PROTHROMBIN TIME, POC: 41 SEC (ref 9.6–14.4)

## 2022-08-23 ENCOUNTER — HOSPITAL ENCOUNTER (OUTPATIENT)
Age: 87
Setting detail: SPECIMEN
Discharge: HOME | End: 2022-08-23

## 2022-08-23 LAB
POC INR: 1.8
PROTHROMBIN TIME, POC: 21.2 SEC (ref 9.6–14.4)

## 2022-09-21 ENCOUNTER — HOSPITAL ENCOUNTER (OUTPATIENT)
Age: 87
Setting detail: SPECIMEN
Discharge: HOME | End: 2022-09-21

## 2022-09-21 LAB
POC INR: 1.5
PROTHROMBIN TIME, POC: 18.5 SEC (ref 9.6–14.4)

## 2022-11-07 ENCOUNTER — HOSPITAL ENCOUNTER (OUTPATIENT)
Age: 87
Setting detail: SPECIMEN
Discharge: HOME | End: 2022-11-07

## 2022-11-07 LAB
POC INR: 2
PROTHROMBIN TIME, POC: 23.9 SEC (ref 9.6–14.4)

## 2022-11-30 RX ORDER — POTASSIUM CHLORIDE 1500 MG/1
TABLET, EXTENDED RELEASE ORAL
Qty: 60 TABLET | Refills: 3 | OUTPATIENT
Start: 2022-11-30

## 2022-12-05 ENCOUNTER — HOSPITAL ENCOUNTER (OUTPATIENT)
Age: 87
Setting detail: SPECIMEN
Discharge: HOME | End: 2022-12-05

## 2022-12-05 LAB
POC INR: 1.8
PROTHROMBIN TIME, POC: 21.7 SEC (ref 9.6–14.4)

## 2022-12-29 RX ORDER — FUROSEMIDE 20 MG/1
TABLET ORAL
Qty: 60 TABLET | Refills: 3 | OUTPATIENT
Start: 2022-12-29

## 2023-01-03 ENCOUNTER — HOSPITAL ENCOUNTER (OUTPATIENT)
Age: 88
Setting detail: SPECIMEN
Discharge: HOME | End: 2023-01-03

## 2023-01-03 LAB
POC INR: 2.1
PROTHROMBIN TIME, POC: 25.1 SEC (ref 9.6–14.4)

## 2023-01-16 RX ORDER — FUROSEMIDE 20 MG/1
TABLET ORAL
Qty: 60 TABLET | Refills: 3 | OUTPATIENT
Start: 2023-01-16

## 2023-01-16 RX ORDER — POTASSIUM CHLORIDE 1500 MG/1
TABLET, EXTENDED RELEASE ORAL
Qty: 60 TABLET | Refills: 3 | OUTPATIENT
Start: 2023-01-16

## 2023-01-30 ENCOUNTER — HOSPITAL ENCOUNTER (OUTPATIENT)
Age: 88
Setting detail: SPECIMEN
Discharge: HOME | End: 2023-01-30

## 2023-01-30 LAB
POC INR: 1.7
PROTHROMBIN TIME, POC: 20.7 SEC (ref 9.6–14.4)

## 2023-02-10 RX ORDER — POTASSIUM CHLORIDE 1500 MG/1
TABLET, EXTENDED RELEASE ORAL
Qty: 60 TABLET | Refills: 3 | OUTPATIENT
Start: 2023-02-10

## 2023-02-13 RX ORDER — POTASSIUM CHLORIDE 1500 MG/1
TABLET, EXTENDED RELEASE ORAL
Qty: 60 TABLET | Refills: 3 | OUTPATIENT
Start: 2023-02-13

## 2023-02-28 ENCOUNTER — HOSPITAL ENCOUNTER (OUTPATIENT)
Age: 88
Setting detail: SPECIMEN
Discharge: HOME | End: 2023-02-28

## 2023-02-28 LAB
POC INR: 2.7
PROTHROMBIN TIME, POC: 32.6 SEC (ref 9.6–14.4)

## 2023-03-28 ENCOUNTER — HOSPITAL ENCOUNTER (OUTPATIENT)
Age: 88
Setting detail: SPECIMEN
Discharge: HOME | End: 2023-03-28

## 2023-03-28 LAB
POC INR: 2.3
PROTHROMBIN TIME, POC: 28 SEC (ref 9.6–14.4)

## 2023-05-04 ENCOUNTER — HOSPITAL ENCOUNTER (OUTPATIENT)
Age: 88
Setting detail: SPECIMEN
Discharge: HOME | End: 2023-05-04

## 2023-05-04 LAB — TSH SERPL-ACNC: 7.04 UIU/ML (ref 0.3–5)

## 2023-05-09 LAB — T4 FREE SERPL-MCNC: 1.1 NG/DL (ref 0.9–1.7)

## 2023-05-15 ENCOUNTER — HOSPITAL ENCOUNTER (OUTPATIENT)
Age: 88
Setting detail: SPECIMEN
Discharge: HOME | End: 2023-05-15

## 2023-05-15 LAB
POC INR: 2
PROTHROMBIN TIME, POC: 24.6 SEC (ref 9.6–14.4)

## 2023-07-10 ENCOUNTER — HOSPITAL ENCOUNTER (OUTPATIENT)
Age: 88
Setting detail: SPECIMEN
Discharge: HOME | End: 2023-07-10

## 2023-07-10 LAB
POC INR: 2.3
PROTHROMBIN TIME, POC: 28.2 SEC (ref 9.6–14.4)

## 2023-08-07 ENCOUNTER — HOSPITAL ENCOUNTER (OUTPATIENT)
Age: 88
Setting detail: SPECIMEN
Discharge: HOME | End: 2023-08-07

## 2023-08-07 LAB
POC INR: 2.2
PROTHROMBIN TIME, POC: 26.2 SEC (ref 9.6–14.4)

## 2023-09-05 ENCOUNTER — HOSPITAL ENCOUNTER (OUTPATIENT)
Age: 88
Setting detail: SPECIMEN
Discharge: HOME | End: 2023-09-05

## 2023-09-05 LAB
POC INR: 2.5
PROTHROMBIN TIME, POC: 29.5 SEC (ref 9.6–14.4)

## 2023-10-02 ENCOUNTER — HOSPITAL ENCOUNTER (OUTPATIENT)
Age: 88
Setting detail: SPECIMEN
Discharge: HOME | End: 2023-10-02

## 2023-10-02 LAB
POC INR: 2
PROTHROMBIN TIME, POC: 24.3 SEC (ref 9.6–14.4)

## 2023-11-02 ENCOUNTER — HOSPITAL ENCOUNTER (OUTPATIENT)
Age: 88
Setting detail: SPECIMEN
Discharge: HOME | End: 2023-11-02

## 2023-11-02 LAB
POC INR: 2
PROTHROMBIN TIME, POC: 24.4 SEC (ref 9.6–14.4)

## 2023-11-27 ENCOUNTER — HOSPITAL ENCOUNTER (OUTPATIENT)
Age: 88
Setting detail: SPECIMEN
Discharge: HOME | End: 2023-11-27

## 2023-11-27 LAB
POC INR: 2.3
PROTHROMBIN TIME, POC: 27.8 SEC (ref 9.6–14.4)

## 2024-01-25 ENCOUNTER — HOSPITAL ENCOUNTER (OUTPATIENT)
Age: 89
Setting detail: SPECIMEN
Discharge: HOME | End: 2024-01-25

## 2024-01-25 LAB
POC INR: 1.8
PROTHROMBIN TIME, POC: 22.4 SEC (ref 9.6–14.4)

## 2024-02-28 ENCOUNTER — HOSPITAL ENCOUNTER (OUTPATIENT)
Age: 89
Setting detail: SPECIMEN
Discharge: HOME | End: 2024-02-28

## 2024-02-28 LAB
POC INR: 4.6
PROTHROMBIN TIME, POC: 55.5 SEC (ref 9.6–14.4)

## 2024-03-06 ENCOUNTER — HOSPITAL ENCOUNTER (OUTPATIENT)
Age: 89
Setting detail: SPECIMEN
Discharge: HOME | End: 2024-03-06

## 2024-03-06 LAB
POC INR: 2.2
PROTHROMBIN TIME, POC: 26 SEC (ref 9.6–14.4)

## 2024-04-03 ENCOUNTER — HOSPITAL ENCOUNTER (OUTPATIENT)
Age: 89
Setting detail: SPECIMEN
Discharge: HOME | End: 2024-04-03

## 2024-04-03 LAB
POC INR: 2.2
PROTHROMBIN TIME, POC: 26.6 SEC (ref 9.6–14.4)

## 2024-04-24 ENCOUNTER — HOSPITAL ENCOUNTER (INPATIENT)
Age: 89
LOS: 1 days | Discharge: HOME | End: 2024-04-25
Attending: EMERGENCY MEDICINE | Admitting: INTERNAL MEDICINE
Payer: MEDICARE

## 2024-04-24 ENCOUNTER — APPOINTMENT (OUTPATIENT)
Dept: CT IMAGING | Age: 89
End: 2024-04-24
Payer: MEDICARE

## 2024-04-24 DIAGNOSIS — R79.1 SUBTHERAPEUTIC INTERNATIONAL NORMALIZED RATIO (INR): ICD-10-CM

## 2024-04-24 DIAGNOSIS — K92.1 BLOOD IN STOOL: Primary | ICD-10-CM

## 2024-04-24 PROBLEM — Z95.0 NORMALLY FUNCTIONING CARDIAC PACEMAKER PRESENT: Chronic | Status: ACTIVE | Noted: 2018-10-10

## 2024-04-24 PROBLEM — K62.5 GASTROINTESTINAL HEMORRHAGE ASSOCIATED WITH ANORECTAL SOURCE: Status: ACTIVE | Noted: 2024-04-24

## 2024-04-24 PROBLEM — I10 PRIMARY HYPERTENSION: Chronic | Status: ACTIVE | Noted: 2017-06-26

## 2024-04-24 PROBLEM — I50.32 CHRONIC DIASTOLIC (CONGESTIVE) HEART FAILURE (HCC): Chronic | Status: ACTIVE | Noted: 2022-04-13

## 2024-04-24 PROBLEM — K92.2 GI BLEED: Status: ACTIVE | Noted: 2024-04-24

## 2024-04-24 LAB
ALBUMIN SERPL-MCNC: 3.7 G/DL (ref 3.5–5.2)
ALP SERPL-CCNC: 148 U/L (ref 35–104)
ALT SERPL-CCNC: 13 U/L (ref 5–33)
ANION GAP SERPL CALCULATED.3IONS-SCNC: 8 MMOL/L (ref 9–17)
AST SERPL-CCNC: 22 U/L
BASOPHILS # BLD: 0.04 K/UL (ref 0–0.2)
BASOPHILS NFR BLD: 1 % (ref 0–2)
BILIRUB SERPL-MCNC: 0.5 MG/DL (ref 0.3–1.2)
BUN SERPL-MCNC: 22 MG/DL (ref 8–23)
BUN/CREAT SERPL: 22 (ref 9–20)
CALCIUM SERPL-MCNC: 9.4 MG/DL (ref 8.6–10.4)
CHLORIDE SERPL-SCNC: 102 MMOL/L (ref 98–107)
CO2 SERPL-SCNC: 28 MMOL/L (ref 20–31)
CREAT SERPL-MCNC: 1 MG/DL (ref 0.5–0.9)
DATE, STOOL #1: ABNORMAL
EOSINOPHIL # BLD: 0.12 K/UL (ref 0–0.44)
EOSINOPHILS RELATIVE PERCENT: 2 % (ref 1–4)
ERYTHROCYTE [DISTWIDTH] IN BLOOD BY AUTOMATED COUNT: 13.2 % (ref 11.8–14.4)
FERRITIN SERPL-MCNC: 63 NG/ML (ref 13–150)
GFR SERPL CREATININE-BSD FRML MDRD: 53 ML/MIN/1.73M2
GLUCOSE SERPL-MCNC: 102 MG/DL (ref 70–99)
HCT VFR BLD AUTO: 31.2 % (ref 36.3–47.1)
HCT VFR BLD AUTO: 33 % (ref 36.3–47.1)
HEMOCCULT SP1 STL QL: POSITIVE
HGB BLD-MCNC: 10.1 G/DL (ref 11.9–15.1)
HGB BLD-MCNC: 9.5 G/DL (ref 11.9–15.1)
IMM GRANULOCYTES # BLD AUTO: 0.02 K/UL (ref 0–0.3)
IMM GRANULOCYTES NFR BLD: 0 %
INR PPP: 1.7
IRON SATN MFR SERPL: 17 % (ref 20–55)
IRON SERPL-MCNC: 46 UG/DL (ref 37–145)
LYMPHOCYTES NFR BLD: 0.8 K/UL (ref 1.1–3.7)
LYMPHOCYTES RELATIVE PERCENT: 16 % (ref 24–43)
MAGNESIUM SERPL-MCNC: 1.6 MG/DL (ref 1.6–2.6)
MCH RBC QN AUTO: 29.4 PG (ref 25.2–33.5)
MCHC RBC AUTO-ENTMCNC: 30.6 G/DL (ref 28.4–34.8)
MCV RBC AUTO: 95.9 FL (ref 82.6–102.9)
MONOCYTES NFR BLD: 0.44 K/UL (ref 0.1–1.2)
MONOCYTES NFR BLD: 9 % (ref 3–12)
NEUTROPHILS NFR BLD: 72 % (ref 36–65)
NEUTS SEG NFR BLD: 3.5 K/UL (ref 1.5–8.1)
NRBC BLD-RTO: 0 PER 100 WBC
PLATELET # BLD AUTO: 184 K/UL (ref 138–453)
PMV BLD AUTO: 10.1 FL (ref 8.1–13.5)
POTASSIUM SERPL-SCNC: 4.5 MMOL/L (ref 3.7–5.3)
PROT SERPL-MCNC: 6.3 G/DL (ref 6.4–8.3)
PROTHROMBIN TIME: 20.2 SEC (ref 11.5–14.2)
RBC # BLD AUTO: 3.44 M/UL (ref 3.95–5.11)
SODIUM SERPL-SCNC: 138 MMOL/L (ref 135–144)
TIBC SERPL-MCNC: 276 UG/DL (ref 250–450)
TIME, STOOL #1: 1130
UNSATURATED IRON BINDING CAPACITY: 230 UG/DL (ref 112–347)
WBC OTHER # BLD: 4.9 K/UL (ref 3.5–11.3)

## 2024-04-24 PROCEDURE — 99222 1ST HOSP IP/OBS MODERATE 55: CPT | Performed by: INTERNAL MEDICINE

## 2024-04-24 PROCEDURE — 82728 ASSAY OF FERRITIN: CPT

## 2024-04-24 PROCEDURE — 85025 COMPLETE CBC W/AUTO DIFF WBC: CPT

## 2024-04-24 PROCEDURE — 1200000000 HC SEMI PRIVATE

## 2024-04-24 PROCEDURE — 36415 COLL VENOUS BLD VENIPUNCTURE: CPT

## 2024-04-24 PROCEDURE — 74176 CT ABD & PELVIS W/O CONTRAST: CPT

## 2024-04-24 PROCEDURE — 83550 IRON BINDING TEST: CPT

## 2024-04-24 PROCEDURE — 2580000003 HC RX 258: Performed by: NURSE PRACTITIONER

## 2024-04-24 PROCEDURE — 82270 OCCULT BLOOD FECES: CPT

## 2024-04-24 PROCEDURE — 6370000000 HC RX 637 (ALT 250 FOR IP): Performed by: INTERNAL MEDICINE

## 2024-04-24 PROCEDURE — 99285 EMERGENCY DEPT VISIT HI MDM: CPT

## 2024-04-24 PROCEDURE — 82746 ASSAY OF FOLIC ACID SERUM: CPT

## 2024-04-24 PROCEDURE — 83735 ASSAY OF MAGNESIUM: CPT

## 2024-04-24 PROCEDURE — 83540 ASSAY OF IRON: CPT

## 2024-04-24 PROCEDURE — 85014 HEMATOCRIT: CPT

## 2024-04-24 PROCEDURE — 85018 HEMOGLOBIN: CPT

## 2024-04-24 PROCEDURE — 93005 ELECTROCARDIOGRAM TRACING: CPT | Performed by: EMERGENCY MEDICINE

## 2024-04-24 PROCEDURE — 82607 VITAMIN B-12: CPT

## 2024-04-24 PROCEDURE — 80053 COMPREHEN METABOLIC PANEL: CPT

## 2024-04-24 PROCEDURE — 85610 PROTHROMBIN TIME: CPT

## 2024-04-24 RX ORDER — METOPROLOL SUCCINATE 25 MG/1
25 TABLET, EXTENDED RELEASE ORAL DAILY
Status: DISCONTINUED | OUTPATIENT
Start: 2024-04-24 | End: 2024-04-25 | Stop reason: HOSPADM

## 2024-04-24 RX ORDER — FUROSEMIDE 20 MG/1
20 TABLET ORAL DAILY
Status: DISCONTINUED | OUTPATIENT
Start: 2024-04-24 | End: 2024-04-25 | Stop reason: HOSPADM

## 2024-04-24 RX ORDER — PANTOPRAZOLE SODIUM 40 MG/1
40 TABLET, DELAYED RELEASE ORAL
Status: DISCONTINUED | OUTPATIENT
Start: 2024-04-25 | End: 2024-04-25 | Stop reason: HOSPADM

## 2024-04-24 RX ORDER — MULTIVITAMIN WITH IRON
1 TABLET ORAL DAILY
Status: DISCONTINUED | OUTPATIENT
Start: 2024-04-24 | End: 2024-04-25 | Stop reason: HOSPADM

## 2024-04-24 RX ORDER — LACTULOSE 10 G/15ML
20 SOLUTION ORAL 2 TIMES DAILY PRN
Status: DISCONTINUED | OUTPATIENT
Start: 2024-04-24 | End: 2024-04-25 | Stop reason: HOSPADM

## 2024-04-24 RX ORDER — SODIUM CHLORIDE 0.9 % (FLUSH) 0.9 %
5-40 SYRINGE (ML) INJECTION PRN
Status: DISCONTINUED | OUTPATIENT
Start: 2024-04-24 | End: 2024-04-25 | Stop reason: HOSPADM

## 2024-04-24 RX ORDER — ONDANSETRON 2 MG/ML
4 INJECTION INTRAMUSCULAR; INTRAVENOUS EVERY 6 HOURS PRN
Status: DISCONTINUED | OUTPATIENT
Start: 2024-04-24 | End: 2024-04-25 | Stop reason: HOSPADM

## 2024-04-24 RX ORDER — ACETAMINOPHEN 650 MG/1
650 SUPPOSITORY RECTAL EVERY 6 HOURS PRN
Status: DISCONTINUED | OUTPATIENT
Start: 2024-04-24 | End: 2024-04-25 | Stop reason: HOSPADM

## 2024-04-24 RX ORDER — CITALOPRAM 20 MG/1
20 TABLET ORAL DAILY
Status: DISCONTINUED | OUTPATIENT
Start: 2024-04-24 | End: 2024-04-25 | Stop reason: HOSPADM

## 2024-04-24 RX ORDER — SODIUM CHLORIDE 9 MG/ML
INJECTION, SOLUTION INTRAVENOUS PRN
Status: DISCONTINUED | OUTPATIENT
Start: 2024-04-24 | End: 2024-04-25 | Stop reason: HOSPADM

## 2024-04-24 RX ORDER — FUROSEMIDE 20 MG/1
20 TABLET ORAL DAILY
Status: ON HOLD | COMMUNITY
End: 2024-04-30 | Stop reason: HOSPADM

## 2024-04-24 RX ORDER — DOCUSATE SODIUM 100 MG/1
100 CAPSULE, LIQUID FILLED ORAL DAILY
Status: DISCONTINUED | OUTPATIENT
Start: 2024-04-24 | End: 2024-04-25 | Stop reason: HOSPADM

## 2024-04-24 RX ORDER — LEVOTHYROXINE SODIUM 0.03 MG/1
25 TABLET ORAL DAILY
COMMUNITY
Start: 2023-07-12

## 2024-04-24 RX ORDER — LEVOTHYROXINE SODIUM 0.03 MG/1
25 TABLET ORAL DAILY
Status: DISCONTINUED | OUTPATIENT
Start: 2024-04-24 | End: 2024-04-25 | Stop reason: HOSPADM

## 2024-04-24 RX ORDER — LISINOPRIL 10 MG/1
10 TABLET ORAL DAILY
Status: DISCONTINUED | OUTPATIENT
Start: 2024-04-24 | End: 2024-04-25 | Stop reason: HOSPADM

## 2024-04-24 RX ORDER — ONDANSETRON 4 MG/1
4 TABLET, ORALLY DISINTEGRATING ORAL EVERY 8 HOURS PRN
Status: DISCONTINUED | OUTPATIENT
Start: 2024-04-24 | End: 2024-04-25 | Stop reason: HOSPADM

## 2024-04-24 RX ORDER — ACETAMINOPHEN 325 MG/1
650 TABLET ORAL EVERY 6 HOURS PRN
Status: DISCONTINUED | OUTPATIENT
Start: 2024-04-24 | End: 2024-04-25 | Stop reason: HOSPADM

## 2024-04-24 RX ORDER — ATORVASTATIN CALCIUM 40 MG/1
40 TABLET, FILM COATED ORAL DAILY
Status: DISCONTINUED | OUTPATIENT
Start: 2024-04-24 | End: 2024-04-25 | Stop reason: HOSPADM

## 2024-04-24 RX ORDER — SODIUM CHLORIDE 0.9 % (FLUSH) 0.9 %
5-40 SYRINGE (ML) INJECTION EVERY 12 HOURS SCHEDULED
Status: DISCONTINUED | OUTPATIENT
Start: 2024-04-24 | End: 2024-04-25 | Stop reason: HOSPADM

## 2024-04-24 RX ADMIN — DOCUSATE SODIUM 100 MG: 100 CAPSULE, LIQUID FILLED ORAL at 18:33

## 2024-04-24 RX ADMIN — SODIUM CHLORIDE, PRESERVATIVE FREE 10 ML: 5 INJECTION INTRAVENOUS at 20:41

## 2024-04-24 ASSESSMENT — PAIN - FUNCTIONAL ASSESSMENT: PAIN_FUNCTIONAL_ASSESSMENT: NONE - DENIES PAIN

## 2024-04-24 NOTE — ED NOTES
Pt resting on cot in no acute distress. All safety measures met. RR even and non labored. Pt denies any needs at this time.

## 2024-04-24 NOTE — CONSENT
Informed Consent for Blood Component Transfusion Note    I have discussed with the patient the rationale for blood component transfusion; its benefits in treating or preventing fatigue, organ damage, or death; and its risk which includes mild transfusion reactions, rare risk of blood borne infection, or more serious but rare reactions. I have discussed the alternatives to transfusion, including the risk and consequences of not receiving transfusion. The patient had an opportunity to ask questions and had agreed to proceed with transfusion of blood components.    Electronically signed by Joe Ledesma DO on 4/24/24 at 5:15 PM EDT

## 2024-04-24 NOTE — PLAN OF CARE
Pt admitted this afternoon. Admission database completed with the help of her son. Patient alert to self. Forgetful and confused at times at baseline according to her son. She is pleasantly confused. Was told she think she gets constipated at home and had 2 bloody bowel movements before she came to the hospital. Stated that they think she had a bloody BM 1 week ago but were unsure where the blood came from. She is up with a walker. Usually independent at home for most ADLs according to patient and son.  Problem: Discharge Planning  Goal: Discharge to home or other facility with appropriate resources  Outcome: Progressing     Problem: ABCDS Injury Assessment  Goal: Absence of physical injury  Outcome: Progressing     Problem: Safety - Adult  Goal: Free from fall injury  Outcome: Progressing     Problem: Neurosensory - Adult  Goal: Achieves maximal functionality and self care  Outcome: Progressing     Problem: Musculoskeletal - Adult  Goal: Return ADL status to a safe level of function  Outcome: Progressing     Problem: Gastrointestinal - Adult  Goal: Maintains or returns to baseline bowel function  Outcome: Progressing  Goal: Maintains adequate nutritional intake  Outcome: Progressing     Problem: Hematologic - Adult  Goal: Maintains hematologic stability  Outcome: Progressing

## 2024-04-24 NOTE — ED PROVIDER NOTES
EMERGENCY DEPARTMENT ENCOUNTER    Pt Name: Sushila León  MRN: [id number]  Birthdate 6/17/1931  Date of evaluation: 4/24/24  CHIEF COMPLAINT       Chief Complaint   Patient presents with    Rectal Bleeding     Taking  coumadin     HISTORY OF PRESENT ILLNESS   The history is provided by the patient and medical records.  The patient is a 89-year-old female who is brought in by ambulance for bloody stools.  Son at bedside report 1 episode of bloody stools 1 week ago.  Today, he reports a second episode of stool.  He also notes that she typically has infrequent, large-volume bowel movements.  She is on Coumadin status post TAVR.    REVIEW OF SYSTEMS     Review of Systems  All other systems reviewed and are negative.    PASTMEDICAL HISTORY     Past Medical History:   Diagnosis Date    Antral ulcer, acute 12/18/2017    per EGD    Arthritis     CAD (coronary artery disease)     GERD (gastroesophageal reflux disease)     Hx of prosthetic heart valve     Hyperlipidemia     Hypertension      Past Problem List  Patient Active Problem List   Diagnosis Code    Gastrointestinal hemorrhage K92.2    Acute blood loss anemia D62    Acute gastric ulcer with hemorrhage K25.0    Nonrheumatic aortic valve stenosis I35.0    Pulmonary hypertension (HCC) I27.20    Bilateral carotid artery stenosis I65.23    Upper GI bleed K92.2    Antral ulcer, acute K25.3    Atrial fibrillation (HCC) I48.91    Arthritis M19.90    Sinus node dysfunction (HCC) I49.5    Complete heart block (HCC) I44.2    Hypertension I10    History of GI bleed Z87.19    Major depressive disorder with single episode, in remission ([facility]) F32.5    Mixed hyperlipidemia E78.2    Pacemaker Z95.0    S/P TAVR (transcatheter aortic valve replacement) Z95.2    Sensorineural hearing loss (SNHL), bilateral H90.3    SOB (shortness of breath) R06.02    Acute bronchitis due to Rhinovirus J20.6    CAD (coronary artery disease) I25.10    Congestive heart failure (HCC) I50.9    Acute on    Constipation.      Chronic appearing L1 compression fracture with approximately 80% loss of   vertebral body height and prior vertebroplasty cement             LABS: Lab orders shown below, the results are reviewed by myself, and all abnormals are listed below.  Labs Reviewed   CBC WITH AUTO DIFFERENTIAL - Abnormal; Notable for the following components:       Result Value    RBC 3.44 (*)     Hemoglobin 10.1 (*)     Hematocrit 33.0 (*)     Neutrophils % 72 (*)     Lymphocytes % 16 (*)     Lymphocytes Absolute 0.80 (*)     All other components within normal limits   COMPREHENSIVE METABOLIC PANEL - Abnormal; Notable for the following components:    Anion Gap 8 (*)     Glucose 102 (*)     Creatinine 1.0 (*)     Est, Glom Filt Rate 53 (*)     Bun/Cre Ratio 22 (*)     Total Protein 6.3 (*)     Alkaline Phosphatase 148 (*)     All other components within normal limits   BLOOD OCCULT STOOL SCREEN #1 - Abnormal; Notable for the following components:    Occult Blood, Stool #1 POSITIVE (*)     All other components within normal limits   PROTIME-INR - Abnormal; Notable for the following components:    Protime 20.2 (*)     All other components within normal limits   MAGNESIUM   URINALYSIS   HEMOGLOBIN AND HEMATOCRIT   HEMOGLOBIN AND HEMATOCRIT   VITAMIN B12 & FOLATE   IRON AND TIBC   FERRITIN       Vitals Reviewed:    Vitals:    04/24/24 1030 04/24/24 1513   BP: 121/67 (!) 157/48   Pulse: 61 61   Resp: 18 16   Temp: 98.1 °F (36.7 °C) 98.1 °F (36.7 °C)   TempSrc: Oral Oral   SpO2: 97% 97%   Weight: 81.6 kg (180 lb)    Height: 1.626 m (5' 4\")      MEDICATIONS GIVEN TO PATIENT THIS ENCOUNTER:  Orders Placed This Encounter   Medications    atorvastatin (LIPITOR) tablet 40 mg    citalopram (CELEXA) tablet 20 mg    furosemide (LASIX) tablet 20 mg    lisinopril (PRINIVIL;ZESTRIL) tablet 10 mg    metoprolol succinate (TOPROL XL) extended release tablet 25 mg    multivitamin 1 tablet    pantoprazole (PROTONIX) tablet 40 mg    sodium  chloride flush 0.9 % injection 5-40 mL    sodium chloride flush 0.9 % injection 5-40 mL    0.9 % sodium chloride infusion    OR Linked Order Group     ondansetron (ZOFRAN-ODT) disintegrating tablet 4 mg     ondansetron (ZOFRAN) injection 4 mg    OR Linked Order Group     acetaminophen (TYLENOL) tablet 650 mg     acetaminophen (TYLENOL) suppository 650 mg    levothyroxine (SYNTHROID) tablet 25 mcg     DISCHARGE PRESCRIPTIONS:  Current Discharge Medication List        PHYSICIAN CONSULTS ORDERED THIS ENCOUNTER:  IP CONSULT TO INTERNAL MEDICINE  IP CONSULT TO GI  IP CONSULT TO GI  FINAL IMPRESSION      1. Blood in stool    2. Subtherapeutic international normalized ratio (INR)          DISPOSITION/PLAN   DISPOSITION Admitted 04/24/2024 02:30:51 PM      OUTPATIENT FOLLOW UP THE PATIENT:  No follow-up provider specified.    MD Kalina Bhakta Joseph R, MD  04/24/24 [id number]

## 2024-04-24 NOTE — H&P
[facility]  Office: [phone]  Elvin Rubio DO, Jericho Quan DO, Joe Ledesma DO, Shamar Carrasco DO, Sabine Andersen MD, Reshma Nicholson MD, Lamberto Barbosa MD, Cynthia Sheridan MD,  Aneesh Flores MD, Caroline Carpenter MD, Jc Silverio MD,  Misty Jones DO, Kadeem Gama MD, Endy Jim MD, Lasha Rubio DO, Caron Toro MD,  Trevor Valadez DO, Tosin Dotson MD, Alejandra Mcelroy MD, Honey Portillo MD, Dasia Whitley MD,  Santhosh Martinez MD, Tameka Baoteng MD, Doc Marquez MD, Lion Deutsch MD, Lorenzo Boateng MD, Una Rivero MD, Marcus Dexter DO, Osmany Balderrama DO, Karson Garner MD,  Edwin Pickering MD, Shirley Waterhouse, CNP,  Kristi Ko CNP, Jani To, CNP,  Nataliia Osman, MARLENE, Lorenza Mcfarland, CNP, Joselyn Murray, CNP, Christina Chanel CNP, Loan Lindsay, CNP, Dina Rivas, PA-C, Patricia Jules PA-C, Elizabeth Tony, CNP, Karine Acosta, CNP, Dottie Walker, CNP, Marilee Barrera, CNP, Antonia Doherty, CNP, Ghislaine Atkinson, CNS, Mikaela Tsai, CNP, Sybil Cobian CNP, Tracy Schwab, CNP         [facility]   IN-PATIENT SERVICE   [facility]    HISTORY AND PHYSICAL EXAMINATION            Date:   4/24/2024  Patient name:  Sushila León  Date of admission:  4/24/2024 10:35 AM  MRN:   [id number]  Account:  [id number]  YOB: 1931  PCP:    Elis Tse APRN - CNP  Room:   2010/2010-02  Code Status:    Full Code    Chief Complaint:     Chief Complaint   Patient presents with    Rectal Bleeding     Taking  coumadin       History Obtained From:     patient, electronic medical record, Quality of history:  poor historian    History of Present Illness:     Sushila León is a 89 y.o. Non- / non  female who presents with Rectal Bleeding (Taking  coumadin)   and is admitted to the hospital for the management of Gastrointestinal hemorrhage associated with anorectal source.    This is a 89-year-old female who  presents with a complaint of bright red blood per rectum.  She has a history of chronic constipation and prior episodes of gastrointestinal bleeding.  She is on chronic warfarin therapy with a history of A-fib, status post pacemaker and TAVR procedure.  She reports that she had bleeding approximately week ago and again today prompting the emergency room visit.  She denies any abdominal pain, nausea vomiting, fevers chills or associated symptoms.  She does not recall if she is ever had an endoscopy in the past, believes she may have had a colonoscopy years ago.    Past Medical History:     Past Medical History:   Diagnosis Date    Antral ulcer, acute 12/18/2017    per EGD    Arthritis     CAD (coronary artery disease)     GERD (gastroesophageal reflux disease)     Hx of prosthetic heart valve     Hyperlipidemia     Hypertension         Past Surgical History:     Past Surgical History:   Procedure Laterality Date    AORTIC VALVE REPAIR      tavr    BACK SURGERY      broke her back    CAROTID ENDARTERECTOMY Right     COLON SURGERY      COLONOSCOPY      DENTAL SURGERY      top dentures    DENTAL SURGERY  05/21/2018    6 teeth removed    HERNIA REPAIR  06/20/2012    HERNIA REPAIR  06/18/2013    JOINT REPLACEMENT      TX EGD TRANSORAL BIOPSY SINGLE/MULTIPLE N/A 12/18/2017    EGD BIOPSY performed by Richar Jhaveri MD at [facility] OR    TONSILLECTOMY      UPPER GASTROINTESTINAL ENDOSCOPY      UPPER GASTROINTESTINAL ENDOSCOPY  12/18/2017    1.5 cm antral ulcer,clean base    UPPER GASTROINTESTINAL ENDOSCOPY N/A 4/12/2018    EGD BIOPSY performed by Richar Jhaevri MD at [facility] OR    UPPER GASTROINTESTINAL ENDOSCOPY  04/12/2018    Ulcer appears to be almost healed.  There is some inflammation at the site of ulcer.        Medications Prior to Admission:     Prior to Admission medications    Medication Sig Start Date End Date Taking? Authorizing Provider   levothyroxine (SYNTHROID) 25 MCG tablet Take 1 tablet by mouth daily

## 2024-04-24 NOTE — ED NOTES
Pt to the ED with complaints of rectal bleeding. Family states that she is often constipated and believes that is what it is from. She takes warfarin. Pt is alert and oriented at this time. She denies any abdominal pain recently. She states that the bleeding has been going on for a week and it has not been constant.

## 2024-04-24 NOTE — ED NOTES
ED to inpatient nurses report     Chief Complaint   Patient presents with    Rectal Bleeding     Taking  coumadin      Present to ED from home with complaints of two episodes of blood in stool today. Bright red and denies any abdominal pain.   LOC: alert and orientated to name, place, date  Vital signs   Vitals:    04/24/24 1030   BP: 121/67   Pulse: 61   Resp: 18   Temp: 98.1 °F (36.7 °C)   TempSrc: Oral   SpO2: 97%   Weight: 81.6 kg (180 lb)   Height: 1.626 m (5' 4\")      Oxygen Baseline room air    Current needs required none     LDAs:   Peripheral IV 04/24/24 Right;Anterior Antecubital (Active)   Site Assessment Clean, dry & intact 04/24/24 1057   Line Status Blood return noted 04/24/24 1057     Mobility: Requires assistance * 1  Fall Risk:    Pending ED orders: UA  Present condition: stable   Code Status: full  Consults: IP CONSULT TO INTERNAL MEDICINE  IP CONSULT TO GI  IP CONSULT TO GI  []  Hospitalist  Completed  [] yes [] no Who:   [x]  Medicine  Completed  [x] yes [] No Who:   []  Cardiology  Completed  [] yes [] No Who:   [x]  GI   Completed  [x] yes [] No Who:   []  Neurology  Completed  [] yes [] No Who:   []  Nephrology Completed  [] yes [] No Who:    []  Vascular  Completed  [] yes [] No Who:   []  Ortho  Completed  [] yes [] No Who:     []  Surgery  Completed  [] yes [] No Who:    []  Urology  Completed  [] yes [] No Who:    []  CT Surgery Completed  [] yes [] No Who:   []  Podiatry  Completed  [] yes [] No Who:    []  Other    Completed  [] yes [] No Who:  Interventions: none  Important Events: Hemoccult postive        Electronically signed by Emely Herrera RN on 4/24/2024 at 2:41 PM

## 2024-04-25 VITALS
DIASTOLIC BLOOD PRESSURE: 32 MMHG | HEART RATE: 66 BPM | RESPIRATION RATE: 16 BRPM | HEIGHT: 64 IN | WEIGHT: 183 LBS | BODY MASS INDEX: 31.24 KG/M2 | SYSTOLIC BLOOD PRESSURE: 144 MMHG | OXYGEN SATURATION: 98 % | TEMPERATURE: 97.3 F

## 2024-04-25 PROBLEM — K59.01 SLOW TRANSIT CONSTIPATION: Status: ACTIVE | Noted: 2024-04-25

## 2024-04-25 PROBLEM — R79.1 SUBTHERAPEUTIC INTERNATIONAL NORMALIZED RATIO (INR): Status: ACTIVE | Noted: 2024-04-25

## 2024-04-25 PROBLEM — K92.1 BLOOD IN STOOL: Status: ACTIVE | Noted: 2024-04-25

## 2024-04-25 LAB
ANION GAP SERPL CALCULATED.3IONS-SCNC: 7 MMOL/L (ref 9–17)
BACTERIA URNS QL MICRO: ABNORMAL
BILIRUB UR QL STRIP: NEGATIVE
BUN SERPL-MCNC: 16 MG/DL (ref 8–23)
BUN/CREAT SERPL: 18 (ref 9–20)
CALCIUM SERPL-MCNC: 9.6 MG/DL (ref 8.6–10.4)
CHLORIDE SERPL-SCNC: 100 MMOL/L (ref 98–107)
CLARITY UR: CLEAR
CO2 SERPL-SCNC: 28 MMOL/L (ref 20–31)
COLOR UR: YELLOW
CREAT SERPL-MCNC: 0.9 MG/DL (ref 0.5–0.9)
EKG ATRIAL RATE: 54 BPM
EKG Q-T INTERVAL: 466 MS
EKG QRS DURATION: 138 MS
EKG QTC CALCULATION (BAZETT): 466 MS
EKG R AXIS: -19 DEGREES
EKG T AXIS: 50 DEGREES
EKG VENTRICULAR RATE: 60 BPM
EPI CELLS #/AREA URNS HPF: ABNORMAL /HPF (ref 0–5)
FOLATE SERPL-MCNC: >20 NG/ML (ref 4.8–24.2)
GFR SERPL CREATININE-BSD FRML MDRD: 60 ML/MIN/1.73M2
GLUCOSE SERPL-MCNC: 93 MG/DL (ref 70–99)
GLUCOSE UR STRIP-MCNC: NEGATIVE MG/DL
HCT VFR BLD AUTO: 30.2 % (ref 36.3–47.1)
HCT VFR BLD AUTO: 33.2 % (ref 36.3–47.1)
HGB BLD-MCNC: 10.3 G/DL (ref 11.9–15.1)
HGB BLD-MCNC: 9.4 G/DL (ref 11.9–15.1)
HGB UR QL STRIP.AUTO: NEGATIVE
INR PPP: 1.7
KETONES UR STRIP-MCNC: ABNORMAL MG/DL
LEUKOCYTE ESTERASE UR QL STRIP: ABNORMAL
NITRITE UR QL STRIP: NEGATIVE
PARTIAL THROMBOPLASTIN TIME: 32.6 SEC (ref 23.9–33.8)
PH UR STRIP: 6 [PH] (ref 5–8)
POTASSIUM SERPL-SCNC: 4.4 MMOL/L (ref 3.7–5.3)
PROT UR STRIP-MCNC: NEGATIVE MG/DL
PROTHROMBIN TIME: 19.5 SEC (ref 11.5–14.2)
RBC #/AREA URNS HPF: ABNORMAL /HPF (ref 0–2)
SODIUM SERPL-SCNC: 135 MMOL/L (ref 135–144)
SP GR UR STRIP: 1.01 (ref 1–1.03)
UROBILINOGEN UR STRIP-ACNC: NORMAL EU/DL (ref 0–1)
VIT B12 SERPL-MCNC: 547 PG/ML (ref 232–1245)
WBC #/AREA URNS HPF: ABNORMAL /HPF (ref 0–5)

## 2024-04-25 PROCEDURE — 85014 HEMATOCRIT: CPT

## 2024-04-25 PROCEDURE — 6370000000 HC RX 637 (ALT 250 FOR IP): Performed by: INTERNAL MEDICINE

## 2024-04-25 PROCEDURE — 81001 URINALYSIS AUTO W/SCOPE: CPT

## 2024-04-25 PROCEDURE — 99232 SBSQ HOSP IP/OBS MODERATE 35: CPT | Performed by: INTERNAL MEDICINE

## 2024-04-25 PROCEDURE — 36415 COLL VENOUS BLD VENIPUNCTURE: CPT

## 2024-04-25 PROCEDURE — 93010 ELECTROCARDIOGRAM REPORT: CPT | Performed by: INTERNAL MEDICINE

## 2024-04-25 PROCEDURE — 85730 THROMBOPLASTIN TIME PARTIAL: CPT

## 2024-04-25 PROCEDURE — 85610 PROTHROMBIN TIME: CPT

## 2024-04-25 PROCEDURE — 6370000000 HC RX 637 (ALT 250 FOR IP): Performed by: NURSE PRACTITIONER

## 2024-04-25 PROCEDURE — 80048 BASIC METABOLIC PNL TOTAL CA: CPT

## 2024-04-25 PROCEDURE — 85018 HEMOGLOBIN: CPT

## 2024-04-25 PROCEDURE — 99223 1ST HOSP IP/OBS HIGH 75: CPT | Performed by: INTERNAL MEDICINE

## 2024-04-25 RX ORDER — LACTULOSE 10 G/15ML
20 SOLUTION ORAL 2 TIMES DAILY PRN
Qty: 600 ML | Refills: 2 | Status: SHIPPED | OUTPATIENT
Start: 2024-04-25 | End: 2024-05-25

## 2024-04-25 RX ORDER — PSEUDOEPHEDRINE HCL 30 MG
100 TABLET ORAL DAILY
Qty: 30 CAPSULE | Refills: 2 | Status: ON HOLD | OUTPATIENT
Start: 2024-04-26 | End: 2024-04-30 | Stop reason: HOSPADM

## 2024-04-25 RX ORDER — HYDROCORTISONE 25 MG/G
CREAM TOPICAL 2 TIMES DAILY
Status: DISCONTINUED | OUTPATIENT
Start: 2024-04-25 | End: 2024-04-25 | Stop reason: HOSPADM

## 2024-04-25 RX ORDER — HYDROCORTISONE 25 MG/G
CREAM TOPICAL 2 TIMES DAILY
Qty: 28 G | Refills: 2 | Status: SHIPPED | OUTPATIENT
Start: 2024-04-25

## 2024-04-25 RX ADMIN — METOPROLOL SUCCINATE 25 MG: 25 TABLET, EXTENDED RELEASE ORAL at 11:47

## 2024-04-25 RX ADMIN — PANTOPRAZOLE SODIUM 40 MG: 40 TABLET, DELAYED RELEASE ORAL at 05:48

## 2024-04-25 RX ADMIN — DOCUSATE SODIUM 100 MG: 100 CAPSULE, LIQUID FILLED ORAL at 11:47

## 2024-04-25 RX ADMIN — Medication 400 MG: at 12:25

## 2024-04-25 RX ADMIN — ATORVASTATIN CALCIUM 40 MG: 40 TABLET, FILM COATED ORAL at 11:47

## 2024-04-25 RX ADMIN — LEVOTHYROXINE SODIUM 25 MCG: 25 TABLET ORAL at 05:48

## 2024-04-25 RX ADMIN — LISINOPRIL 10 MG: 10 TABLET ORAL at 11:47

## 2024-04-25 RX ADMIN — FUROSEMIDE 20 MG: 20 TABLET ORAL at 11:47

## 2024-04-25 RX ADMIN — MULTIVITAMIN TABLET 1 TABLET: TABLET at 11:47

## 2024-04-25 RX ADMIN — CITALOPRAM 20 MG: 20 TABLET, FILM COATED ORAL at 11:47

## 2024-04-25 NOTE — PLAN OF CARE
Problem: Discharge Planning  Goal: Discharge to home or other facility with appropriate resources  Outcome: Adequate for Discharge     Problem: ABCDS Injury Assessment  Goal: Absence of physical injury  Outcome: Adequate for Discharge     Problem: Safety - Adult  Goal: Free from fall injury  Outcome: Adequate for Discharge     Problem: Neurosensory - Adult  Goal: Achieves maximal functionality and self care  Outcome: Adequate for Discharge     Problem: Musculoskeletal - Adult  Goal: Return ADL status to a safe level of function  Outcome: Adequate for Discharge     Problem: Gastrointestinal - Adult  Goal: Maintains or returns to baseline bowel function  Outcome: Adequate for Discharge  Goal: Maintains adequate nutritional intake  Outcome: Adequate for Discharge     Problem: Hematologic - Adult  Goal: Maintains hematologic stability  Outcome: Adequate for Discharge

## 2024-04-25 NOTE — PLAN OF CARE
Problem: Discharge Planning  Goal: Discharge to home or other facility with appropriate resources  4/25/2024 0000 by Angela Dozier RN  Outcome: Progressing  4/24/2024 1722 by Duarte Barbour RN  Outcome: Progressing     Problem: ABCDS Injury Assessment  Goal: Absence of physical injury  4/25/2024 0000 by Angela Dozier RN  Outcome: Progressing  4/24/2024 1722 by Duarte Barbour RN  Outcome: Progressing     Problem: Safety - Adult  Goal: Free from fall injury  4/25/2024 0000 by Angela Dozier RN  Outcome: Progressing  4/24/2024 1722 by Duarte Barbour RN  Outcome: Progressing     Problem: Neurosensory - Adult  Goal: Achieves maximal functionality and self care  4/25/2024 0000 by Angela Dozier RN  Outcome: Progressing  4/24/2024 1722 by Duarte Barbour RN  Outcome: Progressing     Problem: Musculoskeletal - Adult  Goal: Return ADL status to a safe level of function  4/25/2024 0000 by Angela Dozier RN  Outcome: Progressing  4/24/2024 1722 by Duarte Barbour RN  Outcome: Progressing     Problem: Gastrointestinal - Adult  Goal: Maintains or returns to baseline bowel function  4/25/2024 0000 by Angela Dozier RN  Outcome: Progressing  4/24/2024 1722 by Duarte Barbour RN  Outcome: Progressing  Goal: Maintains adequate nutritional intake  4/25/2024 0000 by Angela Dozier RN  Outcome: Progressing  4/24/2024 1722 by Duarte Barbour RN  Outcome: Progressing     Problem: Hematologic - Adult  Goal: Maintains hematologic stability  4/25/2024 0000 by Angela Dozier RN  Outcome: Progressing  4/24/2024 1722 by Duarte Barbour RN  Outcome: Progressing

## 2024-04-25 NOTE — CONSULTS
GI Consult Note:    Name: Sushila León  MRN: [id number]     Acct: [id number]  Room: 2010/2010-02    Admit Date: 4/24/2024  PCP: Elis Tse APRN - CNP    Physician Requesting Consult: Joe Ledesma DO     Reason for Consult:    Rectal bleeding  Mild anemia  History of constipation  History of colon surgery      Chief Complaint:     Chief Complaint   Patient presents with    Rectal Bleeding     Taking  coumadin       History Obtained From:     Patient and her son at bedside and EMR    History of Present Illness:      Sushila León is a  89 y.o.  female who presents with Rectal Bleeding (Taking  coumadin)    This 89-year-old female patient was evaluated accompanied by her son in the room  She has been admitted what appears to be some rectal bleeding which was bright red  Apparently has history for some constipation issues   Has been on blood thinners including Coumadin  Since hospitalization has no further bleeding  Patient's hemoglobin was 10 on admission and currently 10.3  She is eager to go home  Apparently had some kind of colon surgery long time ago for the purpose of perforation son claims?  No history for colon cancer  Patient not eager to get a colonoscopy at this time  However is willing to be seen as an outpatient  Denies nausea vomiting hematemesis  She denies any fever or chills  Past medical significant arthritis coronary artery disease GERD hyperlipidemia hypertension  No history for alcohol abuse  Symptoms:  Onset:  Location:  abdomen  Duration:  day(s)  Severity:  mild  Quality:  stable      Past Medical History:     Past Medical History:   Diagnosis Date    Antral ulcer, acute 12/18/2017    per EGD    Arthritis     CAD (coronary artery disease)     GERD (gastroesophageal reflux disease)     Hx of prosthetic heart valve     Hyperlipidemia     Hypertension         Past Surgical History:     Past Surgical History:   Procedure Laterality Date    AORTIC VALVE  06/26/2017     Rectal bleeding  Mild anemia  History of constipation  History of colon surgery  Plan:     At this time there is no overt bleeding  Hemoglobin relatively stable  Patient is on blood thinners  She is eager to go home  Local hemorrhoidal care was explained to her and her accompanying son  Pt was given instructions and advice in detail about the symptom of constipation. She was explained about avoidance of fast food, soda pops, cheese and red meat.Was also told to avoid sedatives narcotics and pain killers if possible.     Pt was advised to start drinking ample amount of water and liquid. Was told to adapt and follow an exercise regimen.     Instructions were given to increase the amount of fiber including dietary in terms of bran, cereals, whole wheat, brown bread etc. Was also instructed to start using supplemental fiber either Metamucil, citrucell or bennafiber with ample liquids. She was told to start drinking prune juice which is good for constipation.    If symptoms don't resolve she will require medicines to assist with her symptoms    Pt has verbalized understanding and agreement to this plan.    Outpatient follow-up in 1 to 2 weeks    Explained to the son    Thank you for allowing me to participate in the care of your patient.  Please feel free to contact me with any questions or concerns.     This note is created with the assistance of the speech recognition program.  While intending to generate a document that actually reflects the content of the visit, document can still have some errors including those of syntax and sound like substitutions which may escape proof reading.  Actual meaning can be extrapolated by contextual diversion.    Electronically signed by Mitchel Barbosa MD on 4/25/2024 at 12:10 PM     Copy sent to Elis Mejia APRN - CNP

## 2024-04-25 NOTE — CARE COORDINATION
Case Management Assessment  Initial Evaluation    Date/Time of Evaluation: 4/25/2024 8:49 AM  Assessment Completed by: Leyla Colorado RN    If patient is discharged prior to next notation, then this note serves as note for discharge by case management.    Patient Name: Sushila León                   YOB: 1931  Diagnosis: Blood in stool [K92.1]  GI bleed [K92.2]  Subtherapeutic international normalized ratio (INR) [R79.1]                   Date / Time: 4/24/2024 10:35 AM    Patient Admission Status: Inpatient   Readmission Risk (Low < 19, Mod (19-27), High > 27): No data recorded  Current PCP: Elis Tse APRN - CNP  PCP verified by CM? Yes    Chart Reviewed: Yes      History Provided by: Patient  Patient Orientation: Alert and Oriented    Patient Cognition: Alert    Hospitalization in the last 30 days (Readmission):  No    If yes, Readmission Assessment in CM Navigator will be completed.    Advance Directives:      Code Status: Full Code   Patient's Primary Decision Maker is: Legal Next of Kin    Primary Decision Maker: Eliel León - Child - [phone]    Discharge Planning:    Patient lives with: Children Type of Home: House  Primary Care Giver: Family  Patient Support Systems include: Children, Family Members   Current Financial resources: Medicare  Current community resources: None  Current services prior to admission: None            Current DME:              Type of Home Care services:  None    ADLS  Prior functional level: Assistance with the following:, Housework, Shopping, Cooking  Current functional level: Other (see comment) (Await PT/OT eval)    PT AM-PAC:   /24  OT AM-PAC:   /24    Family can provide assistance at DC: Yes  Would you like Case Management to discuss the discharge plan with any other family members/significant others, and if so, who?    Plans to Return to Present Housing: Unknown at present  Other Identified Issues/Barriers to RETURNING to current  housing: weakness  Potential Assistance needed at discharge: Outpatient PT/OT, Home Care            Potential DME:    Patient expects to discharge to: House  Plan for transportation at discharge: Family    Financial    Payor: [employer] MEDICARE / Plan: [employer] CHOICE-PPO MEDICARE / Product Type: *No Product type* /     Does insurance require precert for SNF: Yes    Potential assistance Purchasing Medications: No  Meds-to-Beds request:        [facility] [id number] - [place], [place] - [address] - P [phone] - F [phone]  [address] [place] [address]  Phone: [phone] Fax: [phone]      Notes:    Factors facilitating achievement of predicted outcomes: Family support, Cooperative, and Pleasant    Barriers to discharge: Limited safety awareness and Decreased endurance    Additional Case Management Notes: CM spoke with patient at bedside, and called and had phone conversation with her son Eliel, to discuss transitional planning. Patient lives in a one story mobile home with her son Madi. Patient uses a walker and is independent with personal care. Patient's sons assist with housekeeping and meal prep. Patient admitted with GIB. Await GI consult and PT/OT eval. Goal to return home w/ son.    The Plan for Transition of Care is related to the following treatment goals of Blood in stool [K92.1]  GI bleed [K92.2]  Subtherapeutic international normalized ratio (INR) [R79.1]    IF APPLICABLE: The Patient and/or patient representative Sushila and her family were provided with a choice of provider and agrees with the discharge plan. Freedom of choice list with basic dialogue that supports the patient's individualized plan of care/goals and shares the quality data associated with the providers was provided to: Patient   Patient Representative Name:       The Patient and/or Patient Representative Agree with the Discharge Plan? Yes    Leyla Colorado RN  Case Management Department  Ph: [phone]

## 2024-04-25 NOTE — DISCHARGE SUMMARY
[facility]  Office: [phone]  Elvin Rubio DO, Jericho Quan DO, Joe Ledesma DO, Shamar Carrasco DO, Sabine Andersen MD, Reshma Nicholson MD, Lamberto Barbosa MD, Cynthia Sheridan MD,  Aneesh Flores MD, Caroline Carpenter MD, Jc Silverio MD,  Misty Jones DO, Kadeem Gama MD, Endy Jim MD, Lasha Rubio DO, Caron Toro MD,  Trevor Valadez DO, Tosin Dotson MD, Alejandra Mcelroy MD, Honey Portillo MD, Dasia Whitley MD,  Santhosh Martinez MD, Tameka Boateng MD, Doc Marquez MD, Lion Deutsch MD, Lorenzo Boateng MD, Una Rivero MD, Marcus Dexter DO, Osmany Balderrama DO, Karson Garner MD,  Edwin Pickering MD, Shirley Waterhouse, CNP,  Kristi Ko CNP, Jani To, CNP,  Nataliia Osman, DNP, Lorenza Mcfarland, CNP, Joselyn Murray, CNP, Christina Chanel CNP, Loan Lindsay, CNP, Dina Rivas, PA-C, Patricia Jules PA-C, Elizabeth Tony, CNP, Karine Acosta, CNP, Dottie Walker, CNP, Marilee Barrera, CNP, Antonia Doherty, CNP, Ghislaine Atkinson, CNS, Mikaela Tsai, CNP, Sybil Cobian CNP, Tracy Schwab, CNP         [facility]   IN-PATIENT SERVICE   [facility]    Discharge Summary     Patient ID: Sushila León  :  1931   MRN: [id number]     ACCOUNT:  [id number]   Patient's PCP: Elis Tse APRN - CNP  Admit Date: 2024   Discharge Date: 2024     Length of Stay: 1  Code Status:  Full Code  Admitting Physician: Joe J Orlop, DO  Discharge Physician: Joe Ledesma DO     Active Discharge Diagnoses:     Hospital Problem Lists:  Principal Problem:    Gastrointestinal hemorrhage associated with anorectal source  Active Problems:    Normocytic anemia    Primary hypertension    Mixed hyperlipidemia    Normally functioning cardiac pacemaker present    S/P TAVR (transcatheter aortic valve replacement)    Chronic diastolic (congestive) heart failure (HCC)    Anticoagulated on Coumadin    Blood in stool    Subtherapeutic international  tablet  Commonly known as: LASIX  What changed: Another medication with the same name was removed. Continue taking this medication, and follow the directions you see here.            CONTINUE taking these medications      atorvastatin 40 MG tablet  Commonly known as: LIPITOR  Take 1 tablet by mouth daily     CALTRATE 600+D PO     Centrum Silver Adult 50+ Tabs     citalopram 20 MG tablet  Commonly known as: CELEXA  TAKE ONE TABLET BY MOUTH DAILY     levothyroxine 25 MCG tablet  Commonly known as: SYNTHROID     lisinopril 10 MG tablet  Commonly known as: PRINIVIL;ZESTRIL  TAKE ONE TABLET BY MOUTH DAILY     MEGARED OMEGA-3 KRILL OIL PO     metoprolol succinate 25 MG extended release tablet  Commonly known as: TOPROL XL  TAKE ONE TABLET BY MOUTH DAILY     pantoprazole 40 MG tablet  Commonly known as: PROTONIX  TAKE ONE TABLET BY MOUTH EVERY MORNING BEFORE BREAKFAST     Tylenol 8 Hour Arthritis Pain 650 MG extended release tablet  Generic drug: acetaminophen     warfarin 2 MG tablet  Commonly known as: COUMADIN               Where to Get Your Medications        These medications were sent to [facility] [address] [place] [address] - [address] [place] [address] -  [phone] - [phone]  [address] [place] [id number]      Phone: [phone]   docusate 100 MG Caps  hydrocortisone 2.5 % Crea rectal cream  lactulose 10 GM/15ML solution  psyllium 400 MG capsule         No discharge procedures on file.    Time Spent on discharge is   27-minute  in patient examination, evaluation, counseling as well as medication reconciliation, prescriptions for required medications, discharge plan and follow up.    Electronically signed by   Joe Ledesma DO  4/25/2024  2:24 PM      Thank you Elis Mejia, APRN - CNP for the opportunity to be involved in this patient's care.

## 2024-04-26 ENCOUNTER — TELEPHONE (OUTPATIENT)
Dept: GASTROENTEROLOGY | Age: 89
End: 2024-04-26

## 2024-04-26 NOTE — TELEPHONE ENCOUNTER
Writer called pt and LVM to call the office to mell a f/u appt. Pt previouslt saw Fredi and would be a New Return.

## 2024-04-26 NOTE — TELEPHONE ENCOUNTER
Patient son called in requesting to schedule appointment. Pre service not able to accommodate. Discharged from [facility] 4/25 needs HFU,  Gastrointestinal hemorrhage associated with anorectal source, instructed to schedule an appointment as soon as possible for a visit in 4 week(s)outpatient colonoscopy as needed.     Please call to discuss    Thank you

## 2024-04-28 ENCOUNTER — HOSPITAL ENCOUNTER (INPATIENT)
Age: 89
LOS: 1 days | Discharge: NURSING, REHAB OR LONG-TERM CARE | DRG: 563 | End: 2024-04-30
Attending: EMERGENCY MEDICINE | Admitting: STUDENT IN AN ORGANIZED HEALTH CARE EDUCATION/TRAINING PROGRAM
Payer: MEDICARE

## 2024-04-28 ENCOUNTER — APPOINTMENT (OUTPATIENT)
Dept: GENERAL RADIOLOGY | Age: 89
DRG: 563 | End: 2024-04-28
Payer: MEDICARE

## 2024-04-28 ENCOUNTER — APPOINTMENT (OUTPATIENT)
Dept: CT IMAGING | Age: 89
DRG: 563 | End: 2024-04-28
Payer: MEDICARE

## 2024-04-28 DIAGNOSIS — S82.841A CLOSED BIMALLEOLAR FRACTURE OF RIGHT ANKLE, INITIAL ENCOUNTER: Primary | ICD-10-CM

## 2024-04-28 LAB
ANION GAP SERPL CALCULATED.3IONS-SCNC: 13 MMOL/L (ref 9–17)
BASOPHILS # BLD: 0.04 K/UL (ref 0–0.2)
BASOPHILS NFR BLD: 1 % (ref 0–2)
BUN SERPL-MCNC: 25 MG/DL (ref 8–23)
BUN/CREAT SERPL: 23 (ref 9–20)
CALCIUM SERPL-MCNC: 9.5 MG/DL (ref 8.6–10.4)
CHLORIDE SERPL-SCNC: 99 MMOL/L (ref 98–107)
CO2 SERPL-SCNC: 25 MMOL/L (ref 20–31)
CREAT SERPL-MCNC: 1.1 MG/DL (ref 0.5–0.9)
EOSINOPHIL # BLD: 0.09 K/UL (ref 0–0.44)
EOSINOPHILS RELATIVE PERCENT: 1 % (ref 1–4)
ERYTHROCYTE [DISTWIDTH] IN BLOOD BY AUTOMATED COUNT: 13.4 % (ref 11.8–14.4)
GFR SERPL CREATININE-BSD FRML MDRD: 47 ML/MIN/1.73M2
GLUCOSE SERPL-MCNC: 127 MG/DL (ref 70–99)
HCT VFR BLD AUTO: 33.5 % (ref 36.3–47.1)
HGB BLD-MCNC: 10.3 G/DL (ref 11.9–15.1)
IMM GRANULOCYTES # BLD AUTO: 0.03 K/UL (ref 0–0.3)
IMM GRANULOCYTES NFR BLD: 0 %
LYMPHOCYTES NFR BLD: 0.72 K/UL (ref 1.1–3.7)
LYMPHOCYTES RELATIVE PERCENT: 9 % (ref 24–43)
MCH RBC QN AUTO: 29.3 PG (ref 25.2–33.5)
MCHC RBC AUTO-ENTMCNC: 30.7 G/DL (ref 28.4–34.8)
MCV RBC AUTO: 95.2 FL (ref 82.6–102.9)
MONOCYTES NFR BLD: 0.71 K/UL (ref 0.1–1.2)
MONOCYTES NFR BLD: 9 % (ref 3–12)
NEUTROPHILS NFR BLD: 80 % (ref 36–65)
NEUTS SEG NFR BLD: 6.53 K/UL (ref 1.5–8.1)
NRBC BLD-RTO: 0 PER 100 WBC
PLATELET # BLD AUTO: 182 K/UL (ref 138–453)
PMV BLD AUTO: 9.5 FL (ref 8.1–13.5)
POTASSIUM SERPL-SCNC: 4.4 MMOL/L (ref 3.7–5.3)
RBC # BLD AUTO: 3.52 M/UL (ref 3.95–5.11)
SODIUM SERPL-SCNC: 137 MMOL/L (ref 135–144)
WBC OTHER # BLD: 8.1 K/UL (ref 3.5–11.3)

## 2024-04-28 PROCEDURE — 85025 COMPLETE CBC W/AUTO DIFF WBC: CPT

## 2024-04-28 PROCEDURE — 80048 BASIC METABOLIC PNL TOTAL CA: CPT

## 2024-04-28 PROCEDURE — 73610 X-RAY EXAM OF ANKLE: CPT

## 2024-04-28 PROCEDURE — 99285 EMERGENCY DEPT VISIT HI MDM: CPT

## 2024-04-28 PROCEDURE — 73030 X-RAY EXAM OF SHOULDER: CPT

## 2024-04-28 PROCEDURE — 70450 CT HEAD/BRAIN W/O DYE: CPT

## 2024-04-28 ASSESSMENT — PAIN - FUNCTIONAL ASSESSMENT: PAIN_FUNCTIONAL_ASSESSMENT: NONE - DENIES PAIN

## 2024-04-29 ENCOUNTER — APPOINTMENT (OUTPATIENT)
Dept: CT IMAGING | Age: 89
DRG: 563 | End: 2024-04-29
Payer: MEDICARE

## 2024-04-29 PROBLEM — Z87.19 HISTORY OF GI BLEED: Status: RESOLVED | Noted: 2017-12-28 | Resolved: 2024-04-29

## 2024-04-29 PROBLEM — K92.2 GASTROINTESTINAL HEMORRHAGE: Status: RESOLVED | Noted: 2017-12-17 | Resolved: 2024-04-29

## 2024-04-29 PROBLEM — D68.69 SECONDARY HYPERCOAGULABLE STATE (HCC): Status: ACTIVE | Noted: 2024-04-29

## 2024-04-29 PROBLEM — I50.9 HEART FAILURE (HCC): Status: RESOLVED | Noted: 2022-04-19 | Resolved: 2024-04-29

## 2024-04-29 PROBLEM — S82.841A ANKLE FRACTURE, BIMALLEOLAR, CLOSED, RIGHT, INITIAL ENCOUNTER: Status: ACTIVE | Noted: 2024-04-29

## 2024-04-29 PROBLEM — I50.9 CONGESTIVE HEART FAILURE (HCC): Status: RESOLVED | Noted: 2022-04-13 | Resolved: 2024-04-29

## 2024-04-29 PROBLEM — K92.1 BLOOD IN STOOL: Status: RESOLVED | Noted: 2024-04-25 | Resolved: 2024-04-29

## 2024-04-29 LAB
ANION GAP SERPL CALCULATED.3IONS-SCNC: 8 MMOL/L (ref 9–17)
BASOPHILS # BLD: 0 K/UL (ref 0–0.2)
BASOPHILS NFR BLD: 0 % (ref 0–2)
BUN SERPL-MCNC: 23 MG/DL (ref 8–23)
BUN/CREAT SERPL: 23 (ref 9–20)
CALCIUM SERPL-MCNC: 9.4 MG/DL (ref 8.6–10.4)
CHLORIDE SERPL-SCNC: 102 MMOL/L (ref 98–107)
CK SERPL-CCNC: 167 U/L (ref 26–192)
CO2 SERPL-SCNC: 29 MMOL/L (ref 20–31)
CREAT SERPL-MCNC: 1 MG/DL (ref 0.5–0.9)
EOSINOPHIL # BLD: 0.07 K/UL (ref 0–0.44)
EOSINOPHILS RELATIVE PERCENT: 1 % (ref 1–4)
ERYTHROCYTE [DISTWIDTH] IN BLOOD BY AUTOMATED COUNT: 13.3 % (ref 11.8–14.4)
GFR SERPL CREATININE-BSD FRML MDRD: 53 ML/MIN/1.73M2
GLUCOSE SERPL-MCNC: 126 MG/DL (ref 70–99)
HCT VFR BLD AUTO: 31.8 % (ref 36.3–47.1)
HGB BLD-MCNC: 9.7 G/DL (ref 11.9–15.1)
IMM GRANULOCYTES # BLD AUTO: 0 K/UL (ref 0–0.3)
IMM GRANULOCYTES NFR BLD: 0 %
INR PPP: 1.1
LYMPHOCYTES NFR BLD: 0.66 K/UL (ref 1.1–3.7)
LYMPHOCYTES RELATIVE PERCENT: 9 % (ref 24–43)
MCH RBC QN AUTO: 29.1 PG (ref 25.2–33.5)
MCHC RBC AUTO-ENTMCNC: 30.5 G/DL (ref 28.4–34.8)
MCV RBC AUTO: 95.5 FL (ref 82.6–102.9)
MONOCYTES NFR BLD: 0.73 K/UL (ref 0.1–1.2)
MONOCYTES NFR BLD: 10 % (ref 3–12)
NEUTROPHILS NFR BLD: 80 % (ref 36–65)
NEUTS SEG NFR BLD: 5.84 K/UL (ref 1.5–8.1)
NRBC BLD-RTO: 0 PER 100 WBC
PLATELET # BLD AUTO: 171 K/UL (ref 138–453)
PMV BLD AUTO: 9.8 FL (ref 8.1–13.5)
POTASSIUM SERPL-SCNC: 4.4 MMOL/L (ref 3.7–5.3)
PROTHROMBIN TIME: 14.7 SEC (ref 11.5–14.2)
RBC # BLD AUTO: 3.33 M/UL (ref 3.95–5.11)
SODIUM SERPL-SCNC: 139 MMOL/L (ref 135–144)
WBC OTHER # BLD: 7.3 K/UL (ref 3.5–11.3)

## 2024-04-29 PROCEDURE — 82550 ASSAY OF CK (CPK): CPT

## 2024-04-29 PROCEDURE — 80048 BASIC METABOLIC PNL TOTAL CA: CPT

## 2024-04-29 PROCEDURE — APPSS30 APP SPLIT SHARED TIME 16-30 MINUTES: Performed by: NURSE PRACTITIONER

## 2024-04-29 PROCEDURE — 2580000003 HC RX 258: Performed by: NURSE PRACTITIONER

## 2024-04-29 PROCEDURE — 97530 THERAPEUTIC ACTIVITIES: CPT

## 2024-04-29 PROCEDURE — 85025 COMPLETE CBC W/AUTO DIFF WBC: CPT

## 2024-04-29 PROCEDURE — 73700 CT LOWER EXTREMITY W/O DYE: CPT

## 2024-04-29 PROCEDURE — 97535 SELF CARE MNGMENT TRAINING: CPT

## 2024-04-29 PROCEDURE — 1200000000 HC SEMI PRIVATE

## 2024-04-29 PROCEDURE — 36415 COLL VENOUS BLD VENIPUNCTURE: CPT

## 2024-04-29 PROCEDURE — 99222 1ST HOSP IP/OBS MODERATE 55: CPT | Performed by: STUDENT IN AN ORGANIZED HEALTH CARE EDUCATION/TRAINING PROGRAM

## 2024-04-29 PROCEDURE — 97163 PT EVAL HIGH COMPLEX 45 MIN: CPT

## 2024-04-29 PROCEDURE — 85610 PROTHROMBIN TIME: CPT

## 2024-04-29 PROCEDURE — 6370000000 HC RX 637 (ALT 250 FOR IP): Performed by: NURSE PRACTITIONER

## 2024-04-29 PROCEDURE — 97166 OT EVAL MOD COMPLEX 45 MIN: CPT

## 2024-04-29 RX ORDER — PANTOPRAZOLE SODIUM 40 MG/1
40 TABLET, DELAYED RELEASE ORAL
Status: DISCONTINUED | OUTPATIENT
Start: 2024-04-29 | End: 2024-04-30 | Stop reason: HOSPADM

## 2024-04-29 RX ORDER — ACETAMINOPHEN 650 MG/1
650 SUPPOSITORY RECTAL EVERY 6 HOURS PRN
Status: DISCONTINUED | OUTPATIENT
Start: 2024-04-29 | End: 2024-04-30 | Stop reason: HOSPADM

## 2024-04-29 RX ORDER — LEVOTHYROXINE SODIUM 0.03 MG/1
25 TABLET ORAL DAILY
Status: DISCONTINUED | OUTPATIENT
Start: 2024-04-29 | End: 2024-04-30 | Stop reason: HOSPADM

## 2024-04-29 RX ORDER — ONDANSETRON 4 MG/1
4 TABLET, ORALLY DISINTEGRATING ORAL EVERY 8 HOURS PRN
Status: DISCONTINUED | OUTPATIENT
Start: 2024-04-29 | End: 2024-04-30 | Stop reason: HOSPADM

## 2024-04-29 RX ORDER — POTASSIUM CHLORIDE 20 MEQ/1
40 TABLET, EXTENDED RELEASE ORAL PRN
Status: DISCONTINUED | OUTPATIENT
Start: 2024-04-29 | End: 2024-04-30 | Stop reason: HOSPADM

## 2024-04-29 RX ORDER — ATORVASTATIN CALCIUM 40 MG/1
40 TABLET, FILM COATED ORAL DAILY
Status: DISCONTINUED | OUTPATIENT
Start: 2024-04-29 | End: 2024-04-30 | Stop reason: HOSPADM

## 2024-04-29 RX ORDER — MAGNESIUM SULFATE 1 G/100ML
1000 INJECTION INTRAVENOUS PRN
Status: DISCONTINUED | OUTPATIENT
Start: 2024-04-29 | End: 2024-04-30 | Stop reason: HOSPADM

## 2024-04-29 RX ORDER — MORPHINE SULFATE 2 MG/ML
2 INJECTION, SOLUTION INTRAMUSCULAR; INTRAVENOUS
Status: DISCONTINUED | OUTPATIENT
Start: 2024-04-29 | End: 2024-04-30 | Stop reason: HOSPADM

## 2024-04-29 RX ORDER — POTASSIUM CHLORIDE 7.45 MG/ML
10 INJECTION INTRAVENOUS PRN
Status: DISCONTINUED | OUTPATIENT
Start: 2024-04-29 | End: 2024-04-30 | Stop reason: HOSPADM

## 2024-04-29 RX ORDER — DOCUSATE SODIUM 100 MG/1
100 CAPSULE, LIQUID FILLED ORAL DAILY
Status: DISCONTINUED | OUTPATIENT
Start: 2024-04-29 | End: 2024-04-30 | Stop reason: HOSPADM

## 2024-04-29 RX ORDER — SODIUM CHLORIDE 0.9 % (FLUSH) 0.9 %
10 SYRINGE (ML) INJECTION PRN
Status: DISCONTINUED | OUTPATIENT
Start: 2024-04-29 | End: 2024-04-30 | Stop reason: HOSPADM

## 2024-04-29 RX ORDER — SODIUM CHLORIDE 0.9 % (FLUSH) 0.9 %
5-40 SYRINGE (ML) INJECTION EVERY 12 HOURS SCHEDULED
Status: DISCONTINUED | OUTPATIENT
Start: 2024-04-29 | End: 2024-04-30 | Stop reason: HOSPADM

## 2024-04-29 RX ORDER — SODIUM CHLORIDE 9 MG/ML
INJECTION, SOLUTION INTRAVENOUS CONTINUOUS
Status: DISCONTINUED | OUTPATIENT
Start: 2024-04-29 | End: 2024-04-30

## 2024-04-29 RX ORDER — METOPROLOL SUCCINATE 25 MG/1
25 TABLET, EXTENDED RELEASE ORAL DAILY
Status: DISCONTINUED | OUTPATIENT
Start: 2024-04-29 | End: 2024-04-30 | Stop reason: HOSPADM

## 2024-04-29 RX ORDER — POLYETHYLENE GLYCOL 3350 17 G/17G
17 POWDER, FOR SOLUTION ORAL DAILY PRN
Status: DISCONTINUED | OUTPATIENT
Start: 2024-04-29 | End: 2024-04-30 | Stop reason: HOSPADM

## 2024-04-29 RX ORDER — ENOXAPARIN SODIUM 100 MG/ML
40 INJECTION SUBCUTANEOUS DAILY
Status: DISCONTINUED | OUTPATIENT
Start: 2024-04-29 | End: 2024-04-29

## 2024-04-29 RX ORDER — SODIUM CHLORIDE 9 MG/ML
INJECTION, SOLUTION INTRAVENOUS PRN
Status: DISCONTINUED | OUTPATIENT
Start: 2024-04-29 | End: 2024-04-30 | Stop reason: HOSPADM

## 2024-04-29 RX ORDER — ONDANSETRON 2 MG/ML
4 INJECTION INTRAMUSCULAR; INTRAVENOUS EVERY 6 HOURS PRN
Status: DISCONTINUED | OUTPATIENT
Start: 2024-04-29 | End: 2024-04-30 | Stop reason: HOSPADM

## 2024-04-29 RX ORDER — ACETAMINOPHEN 325 MG/1
650 TABLET ORAL EVERY 6 HOURS PRN
Status: DISCONTINUED | OUTPATIENT
Start: 2024-04-29 | End: 2024-04-30 | Stop reason: HOSPADM

## 2024-04-29 RX ADMIN — SODIUM CHLORIDE: 9 INJECTION, SOLUTION INTRAVENOUS at 03:42

## 2024-04-29 RX ADMIN — DOCUSATE SODIUM 100 MG: 100 CAPSULE, LIQUID FILLED ORAL at 08:09

## 2024-04-29 RX ADMIN — SODIUM CHLORIDE, PRESERVATIVE FREE 10 ML: 5 INJECTION INTRAVENOUS at 21:44

## 2024-04-29 RX ADMIN — PANTOPRAZOLE SODIUM 40 MG: 40 TABLET, DELAYED RELEASE ORAL at 08:09

## 2024-04-29 RX ADMIN — ATORVASTATIN CALCIUM 40 MG: 40 TABLET, FILM COATED ORAL at 21:42

## 2024-04-29 RX ADMIN — METOPROLOL SUCCINATE 25 MG: 25 TABLET, EXTENDED RELEASE ORAL at 08:09

## 2024-04-29 RX ADMIN — LEVOTHYROXINE SODIUM 25 MCG: 25 TABLET ORAL at 08:09

## 2024-04-29 RX ADMIN — SODIUM CHLORIDE: 9 INJECTION, SOLUTION INTRAVENOUS at 23:09

## 2024-04-29 RX ADMIN — SODIUM CHLORIDE, PRESERVATIVE FREE 10 ML: 5 INJECTION INTRAVENOUS at 21:42

## 2024-04-29 ASSESSMENT — ENCOUNTER SYMPTOMS
DIARRHEA: 0
VOMITING: 0
COLOR CHANGE: 1
NAUSEA: 0

## 2024-04-29 NOTE — ED PROVIDER NOTES
EMERGENCY DEPARTMENT ENCOUNTER    Pt Name: Sushila León  MRN: [id number]  Birthdate 6/17/1931  Date of evaluation: 4/29/24  CHIEF COMPLAINT       Chief Complaint   Patient presents with    Shoulder Pain     HISTORY OF PRESENT ILLNESS   89-year-old female presents emergency room after a fall earlier today.  Patient lives at home and does have family available helping her out.  They however are not available 24/7.  Patient had a fall earlier today.  Patient is here with her daughter-in-law who states that she came over to change out some clothing for the patient and the patient had fallen and other family members were unable to get her up.  She has pain to her right ankle and right shoulder.  Daughter-in-law is unsure if she had a head injury.  Patient appears to have some memory problems.  She could not initially tell me what happened to bring her in.  Daughter-in-law states that she does have some mild dementia.             REVIEW OF SYSTEMS     Review of Systems   Musculoskeletal:  Positive for arthralgias and gait problem.     PASTMEDICAL HISTORY     Past Medical History:   Diagnosis Date    Antral ulcer, acute 12/18/2017    per EGD    Arthritis     CAD (coronary artery disease)     GERD (gastroesophageal reflux disease)     Hx of prosthetic heart valve     Hyperlipidemia     Hypertension      Past Problem List  Patient Active Problem List   Diagnosis Code    Gastrointestinal hemorrhage K92.2    Normocytic anemia D64.9    Acute gastric ulcer with hemorrhage K25.0    Nonrheumatic aortic valve stenosis I35.0    Pulmonary hypertension (HCC) I27.20    Bilateral carotid artery stenosis I65.23    Upper GI bleed K92.2    Antral ulcer, acute K25.3    Atrial fibrillation (HCC) I48.91    Arthritis M19.90    Sinus node dysfunction (HCC) I49.5    Complete heart block (HCC) I44.2    Primary hypertension I10    History of GI bleed Z87.19    Major depressive disorder with single episode, 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

## 2024-04-29 NOTE — PROGRESS NOTES
DATE: 2024    NAME: Sushila León  MRN: [id number]   : 1931    Patient not seen this date for Occupational Therapy due to:      [] Cancel by RN or physician due to:    [] Hemodialysis    [] Critical Lab Value Level     [] Blood transfusion in progress    [] Acute or unstable cardiovascular status   _MAP < 55 or more than >115  _HR < 40 or > 130    [] Acute or unstable pulmonary status   -FiO2 > 60%   _RR < 5 or >40    _O2 sats < 85%    [] Strict Bedrest    [] Off Unit for surgery or procedure    [] Off Unit for testing       [] Pending imaging to R/O fracture    [] Refusal by Patient      [x] Other- hold eval as pt has bedrest orders/fx and will await for clarification; continue to follow       [] PT being discontinued at this time. Patient independent. No further needs.     [] PT being discontinued at this time as the patient has been transferred to hospice care. No further needs.      Elis Saul, OT

## 2024-04-29 NOTE — CONSULTS
Consultation Note  Foot and Ankle Surgery    Subjective     Chief Complaint: Right ankle pain    HPI:  Sushila León is a 89 y.o. female seen at [facility] for Right ankle pain.  The patient does have dementia and acquired a thorough H&P was difficult.  Per chart review, patient fell at home and was found by family member.  EMS was called and the patient was taken to the emergency department for further evaluation.  While in the emergency room the patient complained of shoulder pain and right ankle pain.  Radiographic imaging/CT imaging showed a bimalleolar ankle fracture with minimal displacement of the fibula.  The patient was placed in a cam boot and was admitted to the hospital for placement.  Podiatry consulted for further evaluation. The patient denies any head injury or LOC. The patient denies any pain to the contralateral extremity. The patient denies any other pedal complaints at this time.     PCP is Elis Tse APRN - CNP    ROS:   Review of Systems   Constitutional:  Negative for chills and fever.   Gastrointestinal:  Negative for diarrhea, nausea and vomiting.   Musculoskeletal:  Positive for arthralgias, gait problem, joint swelling and myalgias.   Skin:  Positive for color change. Negative for wound.       Past Medical History   has a past medical history of Antral ulcer, acute, Arthritis, CAD (coronary artery disease), GERD (gastroesophageal reflux disease), Hx of prosthetic heart valve, Hyperlipidemia, Hypertension, and Secondary hypercoagulable state (HCC).    Past Surgical History   has a past surgical history that includes Colonoscopy; hernia repair (06/20/2012); joint replacement; Tonsillectomy; Dental surgery; back surgery; Upper gastrointestinal endoscopy; Colon surgery; Carotid endarterectomy (Right); Upper gastrointestinal endoscopy (12/18/2017); hernia repair (06/18/2013); pr egd transoral biopsy single/multiple (N/A, 12/18/2017); Upper gastrointestinal endoscopy (N/A,

## 2024-04-29 NOTE — PLAN OF CARE
Patient is A&O to self and knows she is at [facility]. Patient is currently non weight bearing to her right leg. Cam boot in place. PT/OT is on. Patient denies any pain when asked by RN. Patient is on room air. RN attempted to call son to finish admission questions, voice message left, waiting for call back. Patient is a high fall risk, bed alarm is on, bed in lowest position, call device within reach.  Problem: Discharge Planning  Goal: Discharge to home or other facility with appropriate resources  4/29/2024 1620 by Peyton Mack RN  Outcome: Progressing     Problem: Safety - Adult  Goal: Free from fall injury  4/29/2024 1620 by Peyton Mack RN  Outcome: Progressing     Problem: Chronic Conditions and Co-morbidities  Goal: Patient's chronic conditions and co-morbidity symptoms are monitored and maintained or improved  4/29/2024 1620 by Peyton Mack RN  Outcome: Progressing     Problem: Pain  Goal: Verbalizes/displays adequate comfort level or baseline comfort level  Outcome: Progressing     Problem: Musculoskeletal - Adult  Goal: Return mobility to safest level of function  Outcome: Progressing     Problem: Musculoskeletal - Adult  Goal: Maintain proper alignment of affected body part  Outcome: Progressing     Problem: Neurosensory - Adult  Goal: Achieves stable or improved neurological status  Outcome: Progressing     Problem: Hematologic - Adult  Goal: Maintains hematologic stability  Outcome: Progressing

## 2024-04-29 NOTE — CARE COORDINATION
Case Management Assessment  Initial Evaluation    Date/Time of Evaluation: 4/29/2024 3:55 PM  Assessment Completed by: Judi Murphy RN    If patient is discharged prior to next notation, then this note serves as note for discharge by case management.    Patient Name: Sushila León                   YOB: 1931  Diagnosis: Closed bimalleolar fracture of right ankle, initial encounter [S82.841A]  Ankle fracture, bimalleolar, closed, right, initial encounter [S82.841A]                   Date / Time: 4/28/2024  7:24 PM    Patient Admission Status: Inpatient   Readmission Risk (Low < 19, Mod (19-27), High > 27): Readmission Risk Score: 14.2    Current PCP: Elis Tse APRN - CNP  PCP verified by CM? Yes    Chart Reviewed: Yes      History Provided by: Child/Family  Patient Orientation: Person    Patient Cognition: Dementia / Early Alzheimer's    Hospitalization in the last 30 days (Readmission):  Yes    If yes, Readmission Assessment in CM Navigator will be completed.    Advance Directives:      Code Status: Full Code   Patient's Primary Decision Maker is: Named in Scanned ACP Document    Primary Decision Maker: Eliel León - Child - [phone]    Discharge Planning:    Patient lives with: Children Type of Home: Trailer/Mobile Home  Primary Care Giver: Family  Patient Support Systems include: Children   Current Financial resources: Medicare  Current community resources:    Current services prior to admission: Durable Medical Equipment            Current DME: Walker            Type of Home Care services:  None    ADLS  Prior functional level: Assistance with the following:, Cooking, Housework, Shopping, Mobility  Current functional level: Assistance with the following:, Housework, Cooking, Shopping, Mobility    PT AM-PAC:   /24  OT AM-PAC:   /24    Family can provide assistance at DC: Yes  Would you like Case Management to discuss the discharge plan with any other family  agrees with the discharge plan. Freedom of choice list with basic dialogue that supports the patient's individualized plan of care/goals and shares the quality data associated with the providers was provided to:     Patient Representative Name:       The Patient and/or Patient Representative Agree with the Discharge Plan?      Judi Murphy RN  Case Management Department  Ph:  Fax:

## 2024-04-29 NOTE — PROGRESS NOTES
Patient admitted from ER via stretcher. Assisted into bed per staff. Patient's brief changed and purewick applied. See nursing assessment. Patient is oriented to name only. Pleasantly confused. Repeatedly asked where she is. Boot to rt foot applied in ER. Unable to complete admission history due to patient's confusion. Daughter left from ER before the patient came to the floor.

## 2024-04-29 NOTE — CARE COORDINATION
04/29/24 1608   Readmission Assessment   Number of Days since last admission? 1-7 days   Previous Disposition Home with Family   Who is being Interviewed Caregiver   What was the patient's/caregiver's perception as to why they think they needed to return back to the hospital? Other (Comment)  (has appt next week)   Did you visit your Primary Care Physician after you left the hospital, before you returned this time? No   Why weren't you able to visit your PCP? Other (Comment)   Did you see a specialist, such as Cardiac, Pulmonary, Orthopedic Physician, etc. after you left the hospital? No   Who advised the patient to return to the hospital? Self-referral   Does the patient report anything that got in the way of taking their medications? No   In our efforts to provide the best possible care to you and others like you, can you think of anything that we could have done to help you after you left the hospital the first time, so that you might not have needed to return so soon? Arrange for more help when leaving the hospital

## 2024-04-29 NOTE — PROGRESS NOTES
Physical Therapy  Facility/Department: [facility] CARE  Physical Therapy Initial Assessment    Name: Sushila León  : 1931  MRN: [id number]  Date of Service: 2024    Discharge Recommendations:  Patient would benefit from continued therapy after discharge. Pt currently functioning below baseline.  Recommend daily inpatient skilled therapy at time of discharge to maximize long term outcomes and prevent re-admission. Please refer to AM-PAC score for current level of function.       HPI (per chart): Patient presented to the ER today after fall earlier today. The patient has underlying dementia and is not able to provide any information.  Per her family members she was found on the floor in her home and unable to get up so EMS was called.  An x-ray of her ankle showed a bimalleolar fracture with lateral displacement of the distal fibular fracture fragment.  The family is concerned about her going home alone so decision was to admit with podiatry consult.  Podiatry recommended a CT of the ankle and it showed a nondisplaced trimalleolar fracture. The patient was recently discharged after 1 day stay related to rectal bleeding.  Chronic conditions include paroxysmal A-fib and TAVR and is on chronic anticoagulation with warfarin.  During that admission she underwent a colonoscopy was found to have hemorrhoids.  Her warfarin was continued at discharge.       Patient Diagnosis(es): The encounter diagnosis was Closed bimalleolar fracture of right ankle, initial encounter.  Past Medical History:  has a past medical history of Antral ulcer, acute, Arthritis, CAD (coronary artery disease), GERD (gastroesophageal reflux disease), Hx of prosthetic heart valve, Hyperlipidemia, Hypertension, and Secondary hypercoagulable state (HCC).  Past Surgical History:  has a past surgical history that includes Colonoscopy; hernia repair (2012); joint replacement; Tonsillectomy; Dental surgery; back surgery; Upper  Yes  Lower Extremity Weight Bearing Restrictions  Right Lower Extremity Weight Bearing: Non Weight Bearing  Required Braces or Orthoses  Right Lower Extremity Brace:  (CAM boot)  Position Activity Restriction  Other position/activity restrictions: telemetry, RUE IV, CAM boot     Subjective   General  Chart Reviewed: Yes  Patient assessed for rehabilitation services?: Yes  Response To Previous Treatment: Not applicable  Family / Caregiver Present: No  Diagnosis: Right bimalleolar ankle fracture  Follows Commands: Impaired  General Comment  Comments: ELMO Todd reports patient medically appropriate for PT and ok to be OOB NWB on RLE.  Subjective  Subjective: Patient very pleasant and agreeable to PT. Patient reports she does not know why she is at hospital.         Social/Functional History  Social/Functional History  Lives With: Son  Type of Home: House  Home Layout: One level  Home Access:  (Stairs: patient not sure how many)  Bathroom Shower/Tub: Walk-in shower  Bathroom Toilet: Standard  Bathroom Equipment:  (none)  Home Equipment: Rollator  Has the patient had two or more falls in the past year or any fall with injury in the past year?: Yes (multiple falls)  ADL Assistance: Independent  Homemaking Assistance: Needs assistance (son)  Ambulation Assistance: Independent (rollator)  Transfer Assistance: Independent  Active : No  Patient's  Info: son drives  Occupation: Retired  Type of Occupation: worked at [employer]  Leisure & Hobbies: Puzzles  Additional Comments: Patient a questionable historian, PLOF needs confirmed with family if possible.  Vision/Hearing  Vision  Vision: Within Functional Limits  Hearing  Hearing: Exceptions to WFL  Hearing Exceptions: Hard of hearing/hearing concerns    Cognition   Orientation  Orientation Level: Disoriented to person;Disoriented to situation;Disoriented to time (Patient knew she was at a hospital after given cues)  Cognition  Overall Cognitive Status:

## 2024-04-29 NOTE — H&P
[facility]  Office: [phone]  Elvin Rubio DO, Jericho Quan DO, Joe Ledesma DO, Shamar Carrasco DO, Sabine Andersen MD, Reshma Nicholson MD, Lamberto Barbosa MD, Cynthia Sheridan MD,  Aneesh Flores MD, Caroline Carpenter MD, Jc Silverio MD,  Misty Jones DO, Kadeem Gama MD, Endy Jim MD, Lasha Rubio DO, Caron Toro MD,  Trevor Valadez DO, Tosin Dotson MD, Alejandra Mcelroy MD, Honey Portillo MD, Dasia Whitley MD,  Santhosh Martinez MD, Tameka Boateng MD, Doc Marquez MD, Lion Deutsch MD, Lorenzo Boateng MD, Una Rivero MD, Marcus Dexter DO, Osmany Balderrama DO, Karson Garner MD,  Edwin Pickering MD, Shirley Waterhouse, CNP,  Kristi Ko CNP, Jani To, CNP,  Nataliia Osman, MARLENE, Lorenza Mcfarland, CNP, Joselyn Murray, CNP, Christina Chanel CNP, Loan Lindsay, CNP, Dina Rivas, PA-C, Patricia Jules PA-C, Elizabeth Tony, CNP, Karine Acosta, CNP, Dottie Walker, CNP, Marilee Barrera, CNP, Antonia Doherty CNP, Ghislaine Atkinson, CNS, Mikaela Tsai, CNP, Sybil Cobian CNP, Tracy Schwab, CNP         [facility]   IN-PATIENT SERVICE   [facility]    HISTORY AND PHYSICAL EXAMINATION            Date:   4/29/2024  Patient name:  Sushila León  Date of admission:  4/28/2024  7:24 PM  MRN:   [id number]  Account:  [id number]  YOB: 1931  PCP:    Elis Tse APRN - CNP  Room:   [address] [place] [address]  Code Status:    Full Code    Chief Complaint:     Chief Complaint   Patient presents with    Shoulder Pain       History Obtained From:     patient, electronic medical record    History of Present Illness:     Sushila León is a 89 y.o. Non- / non  female who presents with Shoulder Pain   and is admitted to the hospital for the management of Ankle fracture, bimalleolar, closed, right, initial encounter.    Patient presented to the ER today after fall earlier today. The patient has underlying dementia and is not able to  is at baseline.      GCS: GCS eye subscore is 4. GCS motor subscore is 6.      Comments: She can answer to self and her birthday  She can follow simple commands   Psychiatric:         Behavior: Behavior is cooperative.         Investigations:      Laboratory Testing:  Recent Results (from the past 24 hour(s))   CBC with Auto Differential    Collection Time: 04/28/24 10:50 PM   Result Value Ref Range    WBC 8.1 3.5 - 11.3 k/uL    RBC 3.52 (L) 3.95 - 5.11 m/uL    Hemoglobin 10.3 (L) 11.9 - 15.1 g/dL    Hematocrit 33.5 (L) 36.3 - 47.1 %    MCV 95.2 82.6 - 102.9 fL    MCH 29.3 25.2 - 33.5 pg    MCHC 30.7 28.4 - 34.8 g/dL    RDW 13.4 11.8 - 14.4 %    Platelets 182 138 - 453 k/uL    MPV 9.5 8.1 - 13.5 fL    NRBC Automated 0.0 0.0 per 100 WBC    Neutrophils % 80 (H) 36 - 65 %    Lymphocytes % 9 (L) 24 - 43 %    Monocytes % 9 3 - 12 %    Eosinophils % 1 1 - 4 %    Basophils % 1 0 - 2 %    Immature Granulocytes % 0 0 %    Neutrophils Absolute 6.53 1.50 - 8.10 k/uL    Lymphocytes Absolute 0.72 (L) 1.10 - 3.70 k/uL    Monocytes Absolute 0.71 0.10 - 1.20 k/uL    Eosinophils Absolute 0.09 0.00 - 0.44 k/uL    Basophils Absolute 0.04 0.00 - 0.20 k/uL    Immature Granulocytes Absolute 0.03 0.00 - 0.30 k/uL   BMP    Collection Time: 04/28/24 10:50 PM   Result Value Ref Range    Sodium 137 135 - 144 mmol/L    Potassium 4.4 3.7 - 5.3 mmol/L    Chloride 99 98 - 107 mmol/L    CO2 25 20 - 31 mmol/L    Anion Gap 13 9 - 17 mmol/L    Glucose 127 (H) 70 - 99 mg/dL    BUN 25 (H) 8 - 23 mg/dL    Creatinine 1.1 (H) 0.5 - 0.9 mg/dL    Est, Glom Filt Rate 47 (L) >60 mL/min/1.73m2    Bun/Cre Ratio 23 (H) 9 - 20    Calcium 9.5 8.6 - 10.4 mg/dL       Imaging/Diagnostics:  CT ANKLE RIGHT WO CONTRAST    Result Date: 4/29/2024  Nondisplaced trimalleolar fracture.     CT HEAD WO CONTRAST    Result Date: 4/28/2024  No acute intracranial abnormality.     XR ANKLE RIGHT (MIN 3 VIEWS)    Result Date: 4/28/2024  Bimalleolar fracture of the right ankle with

## 2024-04-29 NOTE — PROGRESS NOTES
Physical Therapy  DATE: 2024    NAME: Sushila León  MRN: [id number]   : 1931    Patient not seen this date for Physical Therapy due to:      [] Cancel by RN or physician due to:    [x]  cx, imaging in ED revealed RIGHT bimalleolar fracture with lateral displacement, STRICT BEDREST     [] Critical Lab Value Level     [] Blood transfusion in progress    [] Acute or unstable cardiovascular status   _MAP < 55 or more than >115  _HR < 40 or > 130    [] Acute or unstable pulmonary status   -FiO2 > 60%   _RR < 5 or >40    _O2 sats < 85%    [] Strict Bedrest    [] Off Unit for surgery or procedure    [] Off Unit for testing       [] Pending imaging to R/O fracture    [] Refusal by Patient      [] Other      [] PT being discontinued at this time. Patient independent. No further needs.     [] PT being discontinued at this time as the patient has been transferred to hospice care. No further needs.      MARY MORALES, PT

## 2024-04-29 NOTE — CARE COORDINATION
Social work:  Referral faxed to [place]. Will need [facility] precert if accepted.

## 2024-04-29 NOTE — PROGRESS NOTES
Plan   Occupational Therapy Plan  Times Per Week: 4-5x/wk 1x/day as chloe  Current Treatment Recommendations: Strengthening, Balance training, Functional mobility training, Endurance training, Safety education & training, Pain management, Equipment evaluation, education, & procurement, Patient/Caregiver education & training, Self-Care / ADL, Cognitive/Perceptual training       Restrictions  Restrictions/Precautions  Restrictions/Precautions: Fall Risk, Weight Bearing  Required Braces or Orthoses?: Yes  Lower Extremity Weight Bearing Restrictions  Right Lower Extremity Weight Bearing: Non Weight Bearing  Required Braces or Orthoses  Right Lower Extremity Brace:  (CAM boot)  Position Activity Restriction  Other position/activity restrictions: telemetry, RUE IV, CAM boot      Subjective   General  Chart Reviewed: Yes  Patient assessed for rehabilitation services?: Yes  Family / Caregiver Present: No  Subjective  Subjective: Pt resting in bed, pleasantly confused but agreeable to OT eval. Pt unsure why she is in hospital, writer reoriented patient.       Social/Functional History  Social/Functional History  Lives With: Son  Type of Home: House  Home Layout: One level  Home Access:  (Stairs: patient not sure how many)  Bathroom Shower/Tub: Walk-in shower  Bathroom Toilet: Standard  Bathroom Equipment:  (none)  Home Equipment: Rollator  Has the patient had two or more falls in the past year or any fall with injury in the past year?: Yes (multiple falls)  ADL Assistance: Independent  Homemaking Assistance: Needs assistance (son)  Ambulation Assistance: Independent (rollator)  Transfer Assistance: Independent  Active : No  Patient's  Info: son drives  Occupation: Retired  Type of Occupation: worked at [employer]  Leisure & Hobbies: Puzzles  Additional Comments: Patient a questionable historian, PLOF needs confirmed with family if possible.       Objective   Observation/Palpation  Posture: Fair (Rounded  Co-treatment   Time In 1518         Time Out 1559         Minutes 41          Tx time : 30 min    Co-treatment with PT warranted secondary to decreased safety and independence requiring 2 skilled therapy professionals to address individual discipline's goals. OT addressing preparation for ADL transfer, sitting balance for increased ADL performance, sitting/activity tolerance, functional reaching, environmental safety/scanning, fall prevention, functional mobility for ADL transfers, ability to sequence and follow directions, bed mobility tech, and functional UE strength.             Marlee BECKETT, OT

## 2024-04-29 NOTE — ED NOTES
ED to inpatient nurses report     Chief Complaint   Patient presents with    Shoulder Pain      Present to ED from home  LOC: alert to only name  Vital signs   Vitals:    04/28/24 2331 04/29/24 0045 04/29/24 0100 04/29/24 0131   BP: (!) 145/62  (!) 151/61 (!) 157/46   Pulse:       Resp:       Temp:       TempSrc:       SpO2: 98% 98%  93%   Weight:       Height:          Oxygen Baseline room air    Current needs required room air    LDAs:   Peripheral IV 04/28/24 Right Hand (Active)   Site Assessment Clean, dry & intact 04/28/24 2254   Line Status Blood return noted 04/28/24 2254   Line Care Cap changed 04/28/24 2254     Mobility: Fully dependent usually uses walker at home  Fall Risk: [place] 1 Fall Risk  Presents to emergency department  because of falls (Syncope, seizure, or loss of consciousness): Yes (04/28/24 1927)  Age > 70: Yes (04/28/24 1927)  Altered Mental Status, Intoxication with alcohol or substance confusion (Disorientation, impaired judgment, poor safety awaremess, or inability to follow instructions): No (04/28/24 1927)  Impaired Mobility: Ambulates or transfers with assistive devices or assistance; Unable to ambulate or transer.: No (04/28/24 1927)  Nursing Judgement: Yes (04/28/24 1927)  Pending ED orders: None  Present condition: Stable  Code Status: Full   Consults: IP CONSULT TO INTERNAL MEDICINE  [x]  Hospitalist  Completed  [x] yes [] no Who:   []  Medicine  Completed  [] yes [] No Who:   []  Cardiology  Completed  [] yes [] No Who:   []  GI   Completed  [] yes [] No Who:   []  Neurology  Completed  [] yes [] No Who:   []  Nephrology Completed  [] yes [] No Who:    []  Vascular  Completed  [] yes [] No Who:   []  Ortho  Completed  [] yes [] No Who:     []  Surgery  Completed  [] yes [] No Who:    []  Urology  Completed  [] yes [] No Who:    []  CT Surgery Completed  [] yes [] No Who:   []  Podiatry  Completed  [] yes [] No Who:    []  Other    Completed  [] yes [] No Who:  Interventions: IV,  labs, xray and CT  Important Events:        Electronically signed by Malissa Alcala RN on 4/29/2024 at 2:16 AM

## 2024-04-30 VITALS
OXYGEN SATURATION: 96 % | DIASTOLIC BLOOD PRESSURE: 51 MMHG | TEMPERATURE: 99.3 F | BODY MASS INDEX: 29.99 KG/M2 | HEIGHT: 65 IN | SYSTOLIC BLOOD PRESSURE: 168 MMHG | WEIGHT: 180 LBS | RESPIRATION RATE: 16 BRPM | HEART RATE: 59 BPM

## 2024-04-30 LAB
INR PPP: 1.2
PROTHROMBIN TIME: 14.8 SEC (ref 11.5–14.2)

## 2024-04-30 PROCEDURE — 97530 THERAPEUTIC ACTIVITIES: CPT

## 2024-04-30 PROCEDURE — 85610 PROTHROMBIN TIME: CPT

## 2024-04-30 PROCEDURE — 97110 THERAPEUTIC EXERCISES: CPT

## 2024-04-30 PROCEDURE — 6370000000 HC RX 637 (ALT 250 FOR IP): Performed by: NURSE PRACTITIONER

## 2024-04-30 PROCEDURE — 36415 COLL VENOUS BLD VENIPUNCTURE: CPT

## 2024-04-30 PROCEDURE — 99239 HOSP IP/OBS DSCHRG MGMT >30: CPT | Performed by: NURSE PRACTITIONER

## 2024-04-30 PROCEDURE — 97535 SELF CARE MNGMENT TRAINING: CPT

## 2024-04-30 RX ORDER — WARFARIN SODIUM 5 MG/1
5 TABLET ORAL
Status: COMPLETED | OUTPATIENT
Start: 2024-04-30 | End: 2024-04-30

## 2024-04-30 RX ORDER — ONDANSETRON 4 MG/1
4 TABLET, ORALLY DISINTEGRATING ORAL EVERY 8 HOURS PRN
DISCHARGE
Start: 2024-04-30 | End: 2024-05-03

## 2024-04-30 RX ORDER — WARFARIN SODIUM 2 MG/1
4 TABLET ORAL DAILY
Qty: 30 TABLET | Refills: 3 | DISCHARGE
Start: 2024-04-30

## 2024-04-30 RX ADMIN — DOCUSATE SODIUM 100 MG: 100 CAPSULE, LIQUID FILLED ORAL at 09:01

## 2024-04-30 RX ADMIN — WARFARIN SODIUM 5 MG: 5 TABLET ORAL at 11:09

## 2024-04-30 RX ADMIN — METOPROLOL SUCCINATE 25 MG: 25 TABLET, EXTENDED RELEASE ORAL at 09:01

## 2024-04-30 RX ADMIN — LEVOTHYROXINE SODIUM 25 MCG: 25 TABLET ORAL at 05:23

## 2024-04-30 RX ADMIN — PANTOPRAZOLE SODIUM 40 MG: 40 TABLET, DELAYED RELEASE ORAL at 05:23

## 2024-04-30 RX ADMIN — ACETAMINOPHEN 650 MG: 325 TABLET ORAL at 09:01

## 2024-04-30 ASSESSMENT — PAIN DESCRIPTION - LOCATION: LOCATION: LEG;ANKLE

## 2024-04-30 ASSESSMENT — PAIN - FUNCTIONAL ASSESSMENT: PAIN_FUNCTIONAL_ASSESSMENT: PREVENTS OR INTERFERES WITH ALL ACTIVE AND SOME PASSIVE ACTIVITIES

## 2024-04-30 ASSESSMENT — PAIN DESCRIPTION - ORIENTATION: ORIENTATION: RIGHT

## 2024-04-30 NOTE — PROGRESS NOTES
--    CL 99 102  --    CO2 25 29  --    GLUCOSE 127* 126*  --    BUN 25* 23  --    CREATININE 1.1* 1.0*  --    ANIONGAP 13 8*  --    LABGLOM 47* 53*  --    CALCIUM 9.5 9.4  --    CKTOTAL  --   --  167   No results for input(s): \"PROT\", \"LABALBU\", \"LABA1C\", \"W0NVRYA\", \"C2AZQZM\", \"FT4\", \"TSH\", \"AST\", \"ALT\", \"LDH\", \"GGT\", \"ALKPHOS\", \"LABGGT\", \"BILITOT\", \"BILIDIR\", \"AMMONIA\", \"AMYLASE\", \"LIPASE\", \"LACTATE\", \"CHOL\", \"HDL\", \"LDLCHOLESTEROL\", \"CHOLHDLRATIO\", \"TRIG\", \"VLDL\", \"SNH28EI\", \"PHENYTOIN\", \"PHENYF\", \"URICACID\", \"POCGLU\" in the last 72 hours.  ABG:No results found for: \"POCPH\", \"PHART\", \"PH\", \"POCPCO2\", \"DSF2RAE\", \"PCO2\", \"POCPO2\", \"PO2ART\", \"PO2\", \"POCHCO3\", \"EKG3ULQ\", \"HCO3\", \"NBEA\", \"PBEA\", \"BEART\", \"BE\", \"THGBART\", \"THB\", \"FCU4FHT\", \"SGUC7QCS\", \"Q9XYBOSJ\", \"O2SAT\", \"FIO2\"  Lab Results   Component Value Date/Time    SPECIAL NOT REPORTED 09/16/2019 04:35 PM     Lab Results   Component Value Date/Time    CULTURE ENTEROBACTER CLOACAE 50 to 100,000 CFU/ML (A) 09/16/2019 04:35 PM    CULTURE (A) 09/16/2019 04:35 PM     ESCHERICHIA COLI 10 to 50,000 CFU/ML THIS ORGANISM IS AN EXTENDED-SPECTRUM BETA-LACTAMASE  AND RESISTANCE TO THERAPY WITH PENICILLINS, CEPHALOSPORINS AND AZTREONAM IS EXPECTED.  THESE ORGANISMS GENERALLY REMAIN SUSCEPTIBLE TO CARBAPENEMS.  CONSIDER ID CONSULTATION.         Radiology:  CT ANKLE RIGHT WO CONTRAST    Result Date: 4/29/2024  Nondisplaced trimalleolar fracture.     CT HEAD WO CONTRAST    Result Date: 4/28/2024  No acute intracranial abnormality.     XR ANKLE RIGHT (MIN 3 VIEWS)    Result Date: 4/28/2024  Bimalleolar fracture of the right ankle with mild lateral displacement of the distal fibular fracture fragment.     XR SHOULDER RIGHT (MIN 2 VIEWS)    Result Date: 4/28/2024  No acute fracture or dislocation identified radiographically.     CT ABDOMEN PELVIS WO CONTRAST Additional Contrast? None    Result Date: 4/24/2024  No evidence of acute intra-abdominal or intrapelvic process.  Cholelithiasis without CT evidence of acute cholecystitis. Moderate hiatal hernia. Constipation. Chronic appearing L1 compression fracture with approximately 80% loss of vertebral body height and prior vertebroplasty cement       Physical Examination:        Physical Exam  Vitals and nursing note reviewed. Exam conducted with a chaperone present.   Constitutional:       General: She is awake.   HENT:      Mouth/Throat:      Mouth: Mucous membranes are moist.   Eyes:      Conjunctiva/sclera: Conjunctivae normal.   Cardiovascular:      Rate and Rhythm: Normal rate and regular rhythm.      Heart sounds: Murmur heard.      Comments: Cap refill within normal limits to both lower extremities  Pulmonary:      Effort: Pulmonary effort is normal.      Breath sounds: Normal breath sounds.   Abdominal:      General: Bowel sounds are normal.      Palpations: Abdomen is soft.   Musculoskeletal:      Comments: Right lower extremity Cam boot   Skin:     General: Skin is warm and dry.      Capillary Refill: Capillary refill takes less than 2 seconds.   Neurological:      Mental Status: Mental status is at baseline.   Psychiatric:         Behavior: Behavior is cooperative.         Assessment:        Hospital Problems             Last Modified POA    * (Principal) Closed bimalleolar fracture of right ankle 4/29/2024 Yes    Atrial fibrillation (HCC) 4/29/2024 Yes    Primary hypertension (Chronic) 4/29/2024 Yes    CAD (coronary artery disease) (Chronic) 4/29/2024 Yes    Chronic diastolic (congestive) heart failure (HCC) (Chronic) 4/29/2024 Yes    Secondary hypercoagulable state (HCC) 4/29/2024 Yes       Plan:        Bimalleolar right ankle fracture  Podiatry consulted; recommending conservative measures at this time.  Nonweightbearing to the right lower extremity/cam boot  Pain medications  Discharged to [facility] later today     Chronic A-fib secondary hypercoagulable state  Continue warfarin at discharge     CAD and primary

## 2024-04-30 NOTE — PLAN OF CARE
Problem: Discharge Planning  Goal: Discharge to home or other facility with appropriate resources  4/30/2024 0110 by Sherine Martin RN  Outcome: Progressing  Flowsheets  Taken 4/29/2024 2330 by Sherine Martin RN  Discharge to home or other facility with appropriate resources:   Identify barriers to discharge with patient and caregiver   Identify discharge learning needs (meds, wound care, etc)   Arrange for needed discharge resources and transportation as appropriate   Refer to discharge planning if patient needs post-hospital services based on physician order or complex needs related to functional status, cognitive ability or social support system  Taken 4/29/2024 2000 by Sacha Castellano RN  Discharge to home or other facility with appropriate resources:   Identify barriers to discharge with patient and caregiver   Arrange for needed discharge resources and transportation as appropriate   Identify discharge learning needs (meds, wound care, etc)   Refer to discharge planning if patient needs post-hospital services based on physician order or complex needs related to functional status, cognitive ability or social support system  4/29/2024 1620 by Peyton Mack RN  Outcome: Progressing  4/29/2024 1512 by Peyton Mack RN  Outcome: Progressing     Problem: Safety - Adult  Goal: Free from fall injury  4/30/2024 0110 by Sherine Martin RN  Outcome: Progressing  4/29/2024 1620 by Peyton Mack RN  Outcome: Progressing  4/29/2024 1512 by Peyton Mack RN  Outcome: Progressing     Problem: Chronic Conditions and Co-morbidities  Goal: Patient's chronic conditions and co-morbidity symptoms are monitored and maintained or improved  4/30/2024 0110 by Sherine Martin RN  Outcome: Progressing  Flowsheets  Taken 4/29/2024 2330 by Sherine Martin RN  Care Plan - Patient's Chronic Conditions and Co-Morbidity Symptoms are Monitored and Maintained or Improved:   Monitor and assess patient's chronic  of daily living deficits and provide assistive devices as needed   Obtain physical therapy/occupational therapy consults as needed     Problem: Neurosensory - Adult  Goal: Achieves stable or improved neurological status  4/30/2024 0110 by Sherine Martin RN  Outcome: Progressing  4/29/2024 1620 by Peyton Mack RN  Outcome: Progressing     Problem: Hematologic - Adult  Goal: Maintains hematologic stability  4/30/2024 0110 by Sherine Martin RN  Outcome: Progressing  Flowsheets  Taken 4/29/2024 2330 by Sherine Martin RN  Maintains hematologic stability:   Assess for signs and symptoms of bleeding or hemorrhage   Monitor labs for bleeding or clotting disorders  Taken 4/29/2024 2000 by Sacha Castellano RN  Maintains hematologic stability:   Assess for signs and symptoms of bleeding or hemorrhage   Monitor labs for bleeding or clotting disorders  4/29/2024 1620 by Peyton Mack RN  Outcome: Progressing

## 2024-04-30 NOTE — PROGRESS NOTES
The pt was discharged to [place]. She was transported by [facility] in stable condition.   Report was called to [place].

## 2024-04-30 NOTE — PROGRESS NOTES
Physical Therapy  Facility/Department: [facility] MED [facility]  Rehabilitation Physical Therapy Treatment Note    NAME: Sushila León  : 1931 (89 y.o.)  MRN: [id number]  CODE STATUS: Full Code    Date of Service: 24  RN Gill reports patient is medically stable for therapy treatment this date.    Chart reviewed prior to treatment and patient is agreeable for therapy.  All lines intact and patient positioned comfortably at end of treatment.  All patient needs addressed prior to ending therapy session.      Discharge Recommendation:  Pt currently functioning below baseline.  Recommend daily inpatient skilled therapy at time of discharge to maximize long term outcomes and prevent re-admission. Please refer to AM-PAC score for current level of function.  AMPAC Score: 6    Restrictions:  Restrictions/Precautions: Fall Risk, Weight Bearing, Contact Precautions, General Precautions  Lower Extremity Weight Bearing Restrictions  Right Lower Extremity Weight Bearing: Non Weight Bearing  Required Braces or Orthoses  Right Lower Extremity Brace:  (CAM boot)  Position Activity Restriction  Other position/activity restrictions: telemetry, RUE IV, CAM boot     SUBJECTIVE  Subjective  Subjective: RN and pt agreeable to therapy.  Pt supine in bed upon arrival w/ RN present at bedside.  Pt appearing confused and grimacing in pain w/ movement of RLE however unable to describe or rate pain for staff.  RN present and aware.          OBJECTIVE  Cognition  Overall Cognitive Status: Exceptions  Arousal/Alertness: Delayed responses to stimuli  Following Commands: Follows one step commands with increased time;Follows one step commands with repetition;Inconsistently follows commands  Attention Span: Difficulty attending to directions;Difficulty dividing attention  Memory: Decreased recall of recent events;Decreased short term memory;Decreased recall of precautions  Safety Judgement: Decreased awareness of need for

## 2024-04-30 NOTE — PLAN OF CARE
Problem: Musculoskeletal - Adult  Goal: Return mobility to safest level of function  4/30/2024 1315 by Gill Jordan RN  Outcome: Completed  4/30/2024 0110 by Sherine Martin RN  Outcome: Progressing  Flowsheets  Taken 4/29/2024 2330 by Sherine Martin RN  Return Mobility to Safest Level of Function:   Ensure adequate protection for wounds/incisions during mobilization   Assist with transfers and ambulation using safe patient handling equipment as needed   Obtain physical therapy/occupational therapy consults as needed   Assess patient stability and activity tolerance for standing, transferring and ambulating with or without assistive devices  Taken 4/29/2024 2000 by Sacha Castellano RN  Return Mobility to Safest Level of Function:   Assess patient stability and activity tolerance for standing, transferring and ambulating with or without assistive devices   Assist with transfers and ambulation using safe patient handling equipment as needed   Ensure adequate protection for wounds/incisions during mobilization     Problem: Discharge Planning  Goal: Discharge to home or other facility with appropriate resources  4/30/2024 1315 by Gill Jordan RN  Outcome: Completed  4/30/2024 0110 by Sherine Martin RN  Outcome: Progressing  Flowsheets  Taken 4/29/2024 2330 by Sherine Martin RN  Discharge to home or other facility with appropriate resources:   Identify barriers to discharge with patient and caregiver   Identify discharge learning needs (meds, wound care, etc)   Arrange for needed discharge resources and transportation as appropriate   Refer to discharge planning if patient needs post-hospital services based on physician order or complex needs related to functional status, cognitive ability or social support system  Taken 4/29/2024 2000 by Sacha Castellano RN  Discharge to home or other facility with appropriate resources:   Identify barriers to discharge with patient and caregiver   Arrange for needed  Gill COOL RN  Outcome: Completed  4/30/2024 0110 by Sherine Martin RN  Outcome: Progressing  Flowsheets  Taken 4/29/2024 2330 by Sherine Martin RN  Maintains hematologic stability:   Assess for signs and symptoms of bleeding or hemorrhage   Monitor labs for bleeding or clotting disorders  Taken 4/29/2024 2000 by Sacha Castellano RN  Maintains hematologic stability:   Assess for signs and symptoms of bleeding or hemorrhage   Monitor labs for bleeding or clotting disorders     Problem: Chronic Conditions and Co-morbidities  Goal: Patient's chronic conditions and co-morbidity symptoms are monitored and maintained or improved  4/30/2024 0110 by Sherine Martin RN  Outcome: Progressing  Flowsheets  Taken 4/29/2024 2330 by Sherine Martin RN  Care Plan - Patient's Chronic Conditions and Co-Morbidity Symptoms are Monitored and Maintained or Improved:   Monitor and assess patient's chronic conditions and comorbid symptoms for stability, deterioration, or improvement   Collaborate with multidisciplinary team to address chronic and comorbid conditions and prevent exacerbation or deterioration   Update acute care plan with appropriate goals if chronic or comorbid symptoms are exacerbated and prevent overall improvement and discharge  Taken 4/29/2024 2000 by Sacha Castellano RN  Care Plan - Patient's Chronic Conditions and Co-Morbidity Symptoms are Monitored and Maintained or Improved:   Monitor and assess patient's chronic conditions and comorbid symptoms for stability, deterioration, or improvement   Collaborate with multidisciplinary team to address chronic and comorbid conditions and prevent exacerbation or deterioration   Update acute care plan with appropriate goals if chronic or comorbid symptoms are exacerbated and prevent overall improvement and discharge

## 2024-04-30 NOTE — PROGRESS NOTES
Warfarin Dosing - Pharmacy Consult Note  Consulting Provider: Kristi Ko CNP   Indication:  Atrial Fibrillation  Warfarin Dose prior to admission: 4mg daily (managed by [facility])   Concurrent anticoagulants/antiplatelets: none  Significant Drug Interactions: No obvious interactions  Recent Labs     04/28/24  2250 04/29/24  0457 04/29/24  0639 04/30/24  0823   INR  --   --  1.1 1.2   HGB 10.3* 9.7*  --   --     171  --   --      Recent warfarin administrations        No warfarin orders with administrations found.            Orders not given:            warfarin placeholder: dosing by pharmacy    warfarin (COUMADIN) tablet 5 mg                   Date   INR    Dose  4/30       1.2    Assessment/Plan  (Goal INR: 2 - 3)  INR below goal. Coumadin 5mg today for boosted dose. Will follow INR if patient is not discharged.    Active problem list reviewed.  INR orders are placed.  Chart reviewed for pertinent labs, drug/diet interactions, and past doses.  Documentation of patient's clinical condition was reviewed.    Pharmacy Dosing:  Pharmacy will continue to follow.

## 2024-04-30 NOTE — PLAN OF CARE
Problem: Chronic Conditions and Co-morbidities  Goal: Patient's chronic conditions and co-morbidity symptoms are monitored and maintained or improved  4/30/2024 0110 by Sherine Martin RN  Outcome: Progressing  Flowsheets  Taken 4/29/2024 2330 by Sherine Martin RN  Care Plan - Patient's Chronic Conditions and Co-Morbidity Symptoms are Monitored and Maintained or Improved:   Monitor and assess patient's chronic conditions and comorbid symptoms for stability, deterioration, or improvement   Collaborate with multidisciplinary team to address chronic and comorbid conditions and prevent exacerbation or deterioration   Update acute care plan with appropriate goals if chronic or comorbid symptoms are exacerbated and prevent overall improvement and discharge  Taken 4/29/2024 2000 by Sacha Castellano RN  Care Plan - Patient's Chronic Conditions and Co-Morbidity Symptoms are Monitored and Maintained or Improved:   Monitor and assess patient's chronic conditions and comorbid symptoms for stability, deterioration, or improvement   Collaborate with multidisciplinary team to address chronic and comorbid conditions and prevent exacerbation or deterioration   Update acute care plan with appropriate goals if chronic or comorbid symptoms are exacerbated and prevent overall improvement and discharge     Problem: Discharge Planning  Goal: Discharge to home or other facility with appropriate resources  4/30/2024 1315 by Gill Jordan, RN  Outcome: Completed  4/30/2024 0110 by Sherine Martin RN  Outcome: Progressing  Flowsheets  Taken 4/29/2024 2330 by Sherine Martin RN  Discharge to home or other facility with appropriate resources:   Identify barriers to discharge with patient and caregiver   Identify discharge learning needs (meds, wound care, etc)   Arrange for needed discharge resources and transportation as appropriate   Refer to discharge planning if patient needs post-hospital services based on physician order or complex  needs related to functional status, cognitive ability or social support system  Taken 4/29/2024 2000 by Sacha Castellano RN  Discharge to home or other facility with appropriate resources:   Identify barriers to discharge with patient and caregiver   Arrange for needed discharge resources and transportation as appropriate   Identify discharge learning needs (meds, wound care, etc)   Refer to discharge planning if patient needs post-hospital services based on physician order or complex needs related to functional status, cognitive ability or social support system     Problem: Safety - Adult  Goal: Free from fall injury  4/30/2024 1315 by Gill Jordan RN  Outcome: Completed  4/30/2024 0110 by Sherine Martin RN  Outcome: Progressing     Problem: Pain  Goal: Verbalizes/displays adequate comfort level or baseline comfort level  4/30/2024 1315 by Gill Jordan RN  Outcome: Completed  4/30/2024 0110 by Sherine Martin RN  Outcome: Progressing  Flowsheets (Taken 4/29/2024 1951 by Sacha Castellano RN)  Verbalizes/displays adequate comfort level or baseline comfort level:   Encourage patient to monitor pain and request assistance   Assess pain using appropriate pain scale   Administer analgesics based on type and severity of pain and evaluate response   Implement non-pharmacological measures as appropriate and evaluate response     Problem: Musculoskeletal - Adult  Goal: Return mobility to safest level of function  4/30/2024 1315 by Gill Jordan RN  Outcome: Completed  4/30/2024 0110 by Sherine Martin RN  Outcome: Progressing  Flowsheets  Taken 4/29/2024 2330 by Sherine Martin RN  Return Mobility to Safest Level of Function:   Ensure adequate protection for wounds/incisions during mobilization   Assist with transfers and ambulation using safe patient handling equipment as needed   Obtain physical therapy/occupational therapy consults as needed   Assess patient stability and activity tolerance for standing,

## 2024-04-30 NOTE — CARE COORDINATION
Social Work-Precert obtained for [place]. [facility] will transport at 1PM. Orders faxed. [facility] to call report to [phone]. Family is agreeable with dc plans. Rowdy

## 2024-04-30 NOTE — PROGRESS NOTES
Occupational Therapy  Facility/Department: [facility]  Daily Treatment Note  NAME: Sushila León  : 1931  MRN: [id number]      ELMO Garland reports patient is medically stable for therapy treatment this date.    Chart reviewed prior to treatment and patient is agreeable for therapy.  All lines intact and patient positioned comfortably at end of treatment.  All patient needs addressed prior to ending therapy session.      Date of Service: 2024    Discharge Recommendations:  Patient would benefit from continued therapy after discharge  Pt currently functioning below baseline.  Recommend daily inpatient skilled therapy at time of discharge to maximize long term outcomes and prevent re-admission. Please refer to AM-PAC score for current level of function.    OT Equipment Recommendations  Equipment Needed: Yes  Mobility Devices: ADL Assistive Devices  ADL Assistive Devices: Emergency Alert System;Long-handled Shoe Horn;Long-handled Sponge;Reacher;Sock-Aid Hard      Patient Diagnosis(es): The encounter diagnosis was Closed bimalleolar fracture of right ankle, initial encounter.       Assessment    Assessment: Pt would benefit from continued skilled OT services to increase I and safety during functional tasks to reduce caregiver burden as able. Pt able to complete STS transfer with significant difficulites this date requiring MAX x 2-3 to complete. Pt tolerated standing poorly  Activity Tolerance: Patient limited by fatigue;Patient limited by pain  Discharge Recommendations: Patient would benefit from continued therapy after discharge  Equipment Needed: Yes  Mobility Devices: ADL Assistive Devices        Plan   Occupational Therapy Plan  Times Per Week: 4-5x/wk 1x/day as chloe  Current Treatment Recommendations: Strengthening;Balance training;Functional mobility training;Endurance training;Safety education & training;Pain management;Equipment evaluation, education, & procurement;Patient/Caregiver  taking care of personal grooming?: A Little  How much help for eating meals?: None  AM-PAC Inpatient Daily Activity Raw Score: 13  AM-PAC Inpatient ADL T-Scale Score : 32.03  ADL Inpatient CMS 0-100% Score: 63.03  ADL Inpatient CMS G-Code Modifier : CL    Therapy Time   Individual Concurrent Group Co-treatment   Time In 0853         Time Out 0931         Minutes 38             Co-treatment with PT warranted secondary to decreased safety and independence requiring 2 skilled therapy professionals to address individual discipline's goals.       Marlee BECKETT, OT

## 2024-04-30 NOTE — DISCHARGE INSTR - COC
Continuity of Care Form    Patient Name: Sushila León   :  1931  MRN:  [id number]    Admit date:  2024  Discharge date:  24    Code Status Order: Full Code   Advance Directives:     Admitting Physician:  Edwin Pickering MD  PCP: Elis Tse, APRN - CNP    Discharging Nurse: Gill KIM RN   Discharging Hospital Unit/Room#:   Discharging Unit Phone Number: [phone]    Emergency Contact:   Extended Emergency Contact Information  Primary Emergency Contact: Eliel León   [facility]  Home Phone: [phone]  Relation: Child    Past Surgical History:  Past Surgical History:   Procedure Laterality Date    AORTIC VALVE REPAIR      tavr    BACK SURGERY      broke her back    CAROTID ENDARTERECTOMY Right     COLON SURGERY      COLONOSCOPY      DENTAL SURGERY      top dentures    DENTAL SURGERY  2018    6 teeth removed    HERNIA REPAIR  2012    HERNIA REPAIR  2013    JOINT REPLACEMENT      DE EGD TRANSORAL BIOPSY SINGLE/MULTIPLE N/A 2017    EGD BIOPSY performed by Richar Jhaveir MD at [facility] OR    TONSILLECTOMY      UPPER GASTROINTESTINAL ENDOSCOPY      UPPER GASTROINTESTINAL ENDOSCOPY  2017    1.5 cm antral ulcer,clean base    UPPER GASTROINTESTINAL ENDOSCOPY N/A 2018    EGD BIOPSY performed by Richar Jhaveri MD at [facility] OR    UPPER GASTROINTESTINAL ENDOSCOPY  2018    Ulcer appears to be almost healed.  There is some inflammation at the site of ulcer.       Immunization History:   Immunization History   Administered Date(s) Administered    Influenza Virus Vaccine 10/20/2014, 10/06/2015, 10/10/2016, 10/11/2018    Influenza, High Dose (Fluzone 65 yrs and older) 10/20/2014, 10/06/2015, 10/10/2016    Pneumococcal, PCV-13, PREVNAR 13, (age 6w+), IM, 0.5mL 2015    Pneumococcal, PPSV23, PNEUMOVAX 23, (age 2y+), SC/IM, 0.5mL 2016    TDaP, ADACEL (age 10y-64y), BOOSTRIX (age 10y+), IM, 0.5mL 04/10/2017     Status:  oriented and alert    IV Access:  - None    Nursing Mobility/ADLs:  Walking   Dependent  Transfer  Dependent  Bathing  Assisted  Dressing  Dependent  Toileting  Dependent  Feeding  Assisted  Med Admin  Independent  Med Delivery   whole    Wound Care Documentation and Therapy:        Elimination:  Continence:   Bowel: No  Bladder: No  Urinary Catheter: None   Colostomy/Ileostomy/Ileal Conduit: No       Date of Last BM: ***    Intake/Output Summary (Last 24 hours) at 4/30/2024 0934  Last data filed at 4/30/2024 0558  Gross per 24 hour   Intake 1241.56 ml   Output 400 ml   Net 841.56 ml     I/O last 3 completed shifts:  In: 1241.6 [I.V.:1241.6]  Out: 400 [Urine:400]    Safety Concerns:     History of Falls (last 30 days)    Impairments/Disabilities:      Dementia     Nutrition Therapy:  Current Nutrition Therapy:   - Oral Diet:  General    Routes of Feeding: Oral  Liquids: No Restrictions  Daily Fluid Restriction: no  Last Modified Barium Swallow with Video (Video Swallowing Test): not done    Treatments at the Time of Hospital Discharge:   Respiratory Treatments: ***  Oxygen Therapy:  is not on home oxygen therapy.  Ventilator:    - No ventilator support    Rehab Therapies: {THERAPEUTIC INTERVENTION:8828063870}  Weight Bearing Status/Restrictions: Non-weight bearing on right leg  Other Medical Equipment (for information only, NOT a DME order):    CAM BOOT   Other Treatments: ***    Patient's personal belongings (please select all that are sent with patient):  {[facility] DME Belongings:[id number]}    RN SIGNATURE:  Electronically signed by ABDULLAHI CHILDERS RN on 4/30/24 at 11:04 AM EDT    CASE MANAGEMENT/SOCIAL WORK SECTION    Inpatient Status Date: ***    Readmission Risk Assessment Score:  Readmission Risk              Risk of Unplanned Readmission:  17           Discharging to Facility/ Agency   Name: You are discharging to a Skilled Nursing Facility,  For 1 week of Physical Therapy, prior to going

## 2024-04-30 NOTE — DISCHARGE SUMMARY
[facility]  Office: [phone]  Elvin Rubio DO, Jericho Quan DO, Joe Ledesma DO, Shamar Carrasco DO, Sabine Andersen MD, Reshma Nicholson MD, Lamberto Barbosa MD, Cynthia Sheridan MD,  Aneesh Flores MD, Caroline Carpenter MD, Jc Silverio MD,  Misty Jones DO, Kadeem Gaam MD, Endy Jim MD, Lasha Rubio DO, Caron Toro MD,  Trevor Valadez DO, Tosin Dotson MD, Alejandra Mcelroy MD, Honey Portillo MD, Dasia Whitley MD,  Santhosh Martinez MD, Tameka Boateng MD, Doc Marquez MD, Lion Deutsch MD, Lorenzo Boateng MD, Una Rivero MD, Marcus Dexter DO, Osmany Balderrama DO, Karson Garner MD,  Edwin Pickering MD, Shirley Waterhouse, CNP,  Kristi Ko CNP, Jani To, CNP,  Nataliia Osman, MARLENE, Lorenza Mcfarland, CNP, Joselyn Murray, CNP, Christina Chanel CNP, Loan Lindsay CNP, Dina Rivas, PA-C, Patricia Jules PA-C, Elizabeth Tony, CNP, Karine Acosta, CNP, Dottie Walker, CNP, Marilee Barrera CNP, Antonia Doherty CNP, Ghislaine Atkinson, CNS, Mikaela Tsai, CNP, Sybil Cobian CNP, Tracy Schwab, CNP         [facility]   IN-PATIENT SERVICE   [facility]    Discharge Summary     Patient ID: Sushila León  :  1931   MRN: [id number]     ACCOUNT:  [id number]   Patient's PCP: Elis Tse APRN - CNP  Admit Date: 2024   Discharge Date: 2024   Length of Stay: 1  Code Status:  Full Code  Admitting Physician: Maxim Zlatopolsky, MD  Discharge Physician: Kristi Ko APRN - CNP     Active Discharge Diagnoses:     Hospital Problem Lists:  Principal Problem:    Closed bimalleolar fracture of right ankle  Active Problems:    Atrial fibrillation (HCC)    Primary hypertension    CAD (coronary artery disease)    Chronic diastolic (congestive) heart failure (HCC)    Secondary hypercoagulable state (HCC)  Resolved Problems:    * No resolved hospital problems. *      Admission Condition:  fair    Discharged Condition: stable    Hospital Stay:  lateral displacement of the distal fibular fracture fragment.     XR SHOULDER RIGHT (MIN 2 VIEWS)    Result Date: 4/28/2024  No acute fracture or dislocation identified radiographically.     CT ABDOMEN PELVIS WO CONTRAST Additional Contrast? None    Result Date: 4/24/2024  No evidence of acute intra-abdominal or intrapelvic process. Cholelithiasis without CT evidence of acute cholecystitis. Moderate hiatal hernia. Constipation. Chronic appearing L1 compression fracture with approximately 80% loss of vertebral body height and prior vertebroplasty cement       Consultations:    Consults:     Final Specialist Recommendations/Findings:   IP CONSULT TO INTERNAL MEDICINE  IP CONSULT TO CASE MANAGEMENT  PHARMACY TO DOSE WARFARIN      The patient was seen and examined on day of discharge and this discharge summary is in conjunction with any daily progress note from day of discharge.    Discharge plan:     Disposition: To [facility] at discharge    Physician Follow Up:     Elis Tse, APRN - CNP  [address] [facility] [id number]  [phone]    Follow up  Hospital follow-up in 7 to 10 days    Khoa Alcala DPM  [address]  [facility] [address]  [facility] [address] [phone]    Follow up  hospital follow up    Tristen Marsh MD  [address]  [facility] [address] [phone]    Follow up  Follow-up with provider to discuss whether or not to continue warfarin therapy       Requiring Further Evaluation/Follow Up POST HOSPITALIZATION/Incidental Findings: Follow-up with cardiology to discuss continued warfarin.     Diet: regular diet    Activity: As tolerated    Instructions to Patient: Follow up with PCP in one week  Take medications as instructed  NWB to the right lower extremity/Cam boot   Call 911 or return to the ER if you experience a recurrence of your symptoms or start having fever, chills, chest pain or shortness of breath.      Discharge Medications:      Medication List        START taking these

## 2024-05-06 NOTE — PROGRESS NOTES
Physician Progress Note      PATIENT:               DANAE COBB  [facility] #:                  [id number]  :                       1931  ADMIT DATE:       2024 7:24 PM  DISCH DATE:        2024 1:32 PM  RESPONDING  PROVIDER #:        BERNARDO Wild CNP          QUERY TEXT:    Pt admitted with Closed bimalleolar fracture of right ankle. Pt noted to have   Diffuse osteopenia in XR Shoulder . If possible, please document in   progress notes and discharge summary if you are evaluating and/or treating any   of the following:    The medical record reflects the following:  Risk Factors: Age, sex, osteopenia, fall  Clinical Indicators: Discharge Summary-Closed bimalleolar fracture of right   ankle, XR Shoulder -Diffuse osteopenia. CT OF THE RIGHT ANKLE   -Nondisplaced trimalleolar fracture. Severe demineralization the osseous   structures suggesting osteoporosis.  Treatment: Calcium Carbonate-Vitamin D, XR Shoulder, CT OF THE RIGHT ANKLE        Thank you,  Donna Mcdonnell S CDS  Options provided:  -- Pathological right ankle fracture due to osteopenia following fall which   would not usually break a normal, healthy bone  -- Osteoporotic ght ankle fracture following fall which would not usually   break a normal, healthy bone  -- Traumatic right ankle fracture  -- Other - I will add my own diagnosis  -- Disagree - Not applicable / Not valid  -- Disagree - Clinically unable to determine / Unknown  -- Refer to Clinical Documentation Reviewer    PROVIDER RESPONSE TEXT:    This patient has an osteoporotic right ankle fracture following fall which   would not break a normal, healthy bone.    Query created by: Donna Mcdonnell on 2024 6:47 AM      Electronically signed by:  BERNARDO Wild CNP 2024 8:14 AM

## 2024-05-08 NOTE — PROGRESS NOTES
Physician Progress Note      PATIENT:               DANAE COBB  CSN #:                  [id number]  :                       1931  ADMIT DATE:       2024 10:35 AM  DISCH DATE:        2024 3:29 PM  RESPONDING  PROVIDER #:        Joe Ledesma DO          QUERY TEXT:    Patient admitted with GI bleeding, noted to have hemorrhoids and be   constipated.  If possible, please document in progress notes and discharge   summary the cause of the GI bleeding:  The medical record reflects the following:    Risk Factors: Constipation, Hemorrhoids  Clinical Indicators: She presents with a complaint of constipation with   subsequent rectal bleeding. She was admitted and underwent serial H&H which   remained stable. She underwent GI consultation as she has had a prior   colonoscopy and with her advanced age and findings of hemorrhoids on exam no   endoscopy was planned. Her diet was advanced and tolerated she was initially   on stool softeners and measures for chronic constipation to avoid further   bleeding in the future.  Treatment: GI Consult, H/H, IVF, Stool Softeners    Thank you,  Jewell BRYAN RN  Options provided:  -- GI bleeding due constipation  -- GI bleeding due to hemorrhoids  -- GI bleeding due to, please specify.  -- Other - I will add my own diagnosis  -- Disagree - Not applicable / Not valid  -- Disagree - Clinically unable to determine / Unknown  -- Refer to Clinical Documentation Reviewer    PROVIDER RESPONSE TEXT:    This patient has GI bleeding due to constipation.    Query created by: Tiesha Murray on 2024 12:04 PM      Electronically signed by:  Joe Ledesma DO 5/3/2024 9:37 AM          
CLINICAL PHARMACY NOTE: MEDS TO BEDS    Total # of Prescriptions Filled: 3   The following medications were delivered to the patient:  Proctozone 2.5% cream  Lactulose 10gm/15ml soln  Stool softener 100mg    Additional Documentation:  
[facility]  Office: [phone]  Elvin Rubio DO, Jericho Quan DO, Joe Ledesma DO, Shamar Carrasco DO, Sabine Andersen MD, Reshma Nicholson MD, Lamberto Barbosa MD, Cynthia Sheridan MD,  Aneesh Flores MD, Caroline Carpenter MD, Jc Silverio MD,  Misty Jones DO, Kadeem Gama MD, Endy Jim MD, Lasha Rubio DO, Caron Toro MD,  Trevor Valadez DO, Tosin Dotson MD, Alejandra Mcelroy MD, Honey Portillo MD, Dasia Whitley MD,  Santhosh Martinez MD, Tameka Boateng MD, Doc Marquez MD, Lion Deutsch MD, Lorenzo Boateng MD, Una Rivero MD, Marcus Dexter DO, Osmany Balderrama DO, Karson Garner MD,  Edwin Pickering MD, Shirley Waterhouse, CNP,  Kristi Ko CNP, Jani To, CNP,  Nataliia Osman, DNP, Lorenza Mcfarland, CNP, Joselyn Murray, CNP, Christina Chanel CNP, Loan Lindsay CNP, Dina Rivas, PA-C, Patricia Jules PA-C, Elizabeth Tony, CNP, Karine Acosta, CNP, Dottie Walker, CNP, Marilee Barrera, CNP, Antonia Doherty CNP, Ghislaine Atkinson, CNS, Mikaela Tsai, CNP, Sybil Cobian CNP, Tracy Schwab, CNP         [facility]   IN-PATIENT SERVICE   [facility]    Progress Note    4/25/2024    9:05 AM    Name:   Sushila León  MRN:     [id number]     Acct:      [id number]   Room:   2010/2010-02  IP Day:  1  Admit Date:  4/24/2024 10:35 AM    PCP:   Elis Tse APRN - CNP  Code Status:  Full Code    Subjective:     C/C:   Chief Complaint   Patient presents with    Rectal Bleeding     Taking  coumadin     Interval History Status: improved.     Patient denies any further bleeding.  Denies any complaints of chest pain, shortness of breath, nausea vomiting, fevers chills or or other acute complaints    Brief History:     Sushila León is a 89 y.o. Non- / non  female who presents with Rectal Bleeding (Taking  coumadin)   and is admitted to the hospital for the management of Gastrointestinal hemorrhage associated with anorectal source.   
Preop instructions given to pt's Wife - Patricia:     NPO instructions: NO solids foods,non-clear liquids including milk, milk products, creamers, gum, mints, or hard candy after midnight the night prior to your surgery or 8 hours prior to your SX start time.     For Peripheral Vascular Surgery patients - you may only have sips of water with your AM medications one (1) hour prior to your arrival to the hospital.    If you have received specific instructions from your Surgeon/Surgeon's staff, please follow their instructions.     Showering instructions, directions, leave all valuables at home, medication instructions for PM prior & am of procedure explained. pt's Wife - Patricia stated an understanding.      pt's Wife - Patricia denies any side effects or issues with anesthesia or sedation.                                                                                                                                    pt's Wife - Patricia does not know arrival time. Explained that this information comes from the surgeons office and if they have not heard from them by 2 pm, to call office. pt's Wife - Patricia stated an understanding.  Pt will report to [facility]. Inpatient admit.  
Pt admitted to room 2010. Oriented to room, call light and bed mechanics. Side rails up x2. Call light within reach. Orders reviewed.                                                                                                                                                                                                           
Pt discharged to home in good condition with belongings  Discharge instructions given  \"Meds To Beds\" medications picked up at pharmacy on the way out  Pt denies having any further questions at this time  Locked up home medication(s)/personal items given to patient at discharge  Patient/family state they have everything they were admitted with.      
Transitions of Care Pharmacy Service   Medication Review    The patient's list of current home medications has been reviewed.     Source(s) of information: family, personal med list, Care Everywhere, Surescripts refill report      Other Notes Warfarin is managed by [facility]         PROVIDER ACTION REQUESTED  Medications that need to be addressed by a physician/nurse practitioner:    Medication Action Requested   Lasix 40mg   Prescribed home dose is 20mg daily instead -- consider adjusting order if appropriate      Synthroid needs provider review           Please feel free to call me with any questions about this encounter. Thank you.    Alyson Coates [facility]   Transitions of Care Pharmacy Service  Phone:  [phone]  Fax: [phone]      Electronically signed by Alyson Coates [facility] on 4/24/2024 at 2:59 PM       Prior to Admission medications    Medication Sig   levothyroxine (SYNTHROID) 25 MCG tablet Take 1 tablet by mouth daily   MEGARED OMEGA-3 KRILL OIL PO Take 1 capsule by mouth daily   furosemide (LASIX) 20 MG tablet Take 1 tablet by mouth daily   warfarin (COUMADIN) 2 MG tablet Take 2 tablets by mouth daily   acetaminophen (TYLENOL 8 HOUR ARTHRITIS PAIN) 650 MG extended release tablet Take 650 mg by mouth every 8 hours as needed for Pain   pantoprazole (PROTONIX) 40 MG tablet TAKE ONE TABLET BY MOUTH EVERY MORNING BEFORE BREAKFAST   citalopram (CELEXA) 20 MG tablet TAKE ONE TABLET BY MOUTH DAILY     atorvastatin (LIPITOR) 40 MG tablet Take 1 tablet by mouth daily   lisinopril (PRINIVIL;ZESTRIL) 10 MG tablet TAKE ONE TABLET BY MOUTH DAILY   metoprolol succinate (TOPROL XL) 25 MG extended release tablet TAKE ONE TABLET BY MOUTH DAILY   Calcium Carbonate-Vitamin D (CALTRATE 600+D PO) Take 1 tablet by mouth daily   Multiple Vitamins-Minerals (CENTRUM SILVER ADULT 50+) TABS Take 1 tablet by mouth daily.                 
[x] Medication Reconciliation was completed and the patient's home medication list was verified. The Med List Status has been marked \"Complete\". The following sources were used to assist with Medication Reconciliation:    [x] Patient had a list of medications which was transcribed into the EHR.    [] Patient provided bottles of their medications    [x] Home medications reviewed and confirmed with Eliel (son)    [] Contacted patient's pharmacy to confirm home medications    [] Contacted patient's physician office to confirm home medications    [] Medical Records from another facility and/or Care Everywhere were reviewed    OR    [x] There are one or more home medications that need clarification before Medication Reconciliation can be completed. The Med List Status has been marked as In Progress. To assist with Home Medication Reconciliation the following actions have been taken:    [] Pharmacy medication reconciliation service requested. (Note: This can be done by sending a Perfect Serve message to [facility] Pharmacist or by phoning [phone].)    [] Family requested to bring medications into the hospital    [] Family requested to call hospital with medication list    [] Message left with physician office    [] Request for medical records made to     [x] Other Pharmacist already reviewed medications     
has acute blood loss anemia.    Query created by: Tiesha Murray on 5/7/2024 12:23 PM      Electronically signed by:  Joe Ledesma DO 5/8/2024 6:41 AM

## 2024-06-04 ENCOUNTER — APPOINTMENT (OUTPATIENT)
Dept: GENERAL RADIOLOGY | Age: 89
DRG: 884 | End: 2024-06-04
Payer: MEDICARE

## 2024-06-04 ENCOUNTER — APPOINTMENT (OUTPATIENT)
Dept: CT IMAGING | Age: 89
DRG: 884 | End: 2024-06-04
Payer: MEDICARE

## 2024-06-04 ENCOUNTER — HOSPITAL ENCOUNTER (INPATIENT)
Age: 89
LOS: 1 days | Discharge: HOSPICE | DRG: 884 | End: 2024-06-07
Attending: EMERGENCY MEDICINE | Admitting: FAMILY MEDICINE
Payer: MEDICARE

## 2024-06-04 DIAGNOSIS — R41.82 ALTERED MENTAL STATUS, UNSPECIFIED ALTERED MENTAL STATUS TYPE: Primary | ICD-10-CM

## 2024-06-04 DIAGNOSIS — R44.3 HALLUCINATIONS: ICD-10-CM

## 2024-06-04 PROBLEM — R41.0 CONFUSION: Status: ACTIVE | Noted: 2024-06-04

## 2024-06-04 LAB
ANION GAP SERPL CALCULATED.3IONS-SCNC: 8 MMOL/L (ref 9–17)
BACTERIA URNS QL MICRO: ABNORMAL
BASOPHILS # BLD: 0.03 K/UL (ref 0–0.2)
BASOPHILS NFR BLD: 1 % (ref 0–2)
BILIRUB UR QL STRIP: NEGATIVE
BUN SERPL-MCNC: 17 MG/DL (ref 8–23)
BUN/CREAT SERPL: 21 (ref 9–20)
CALCIUM SERPL-MCNC: 9.2 MG/DL (ref 8.6–10.4)
CASTS #/AREA URNS LPF: ABNORMAL /LPF
CASTS #/AREA URNS LPF: ABNORMAL /LPF
CHLORIDE SERPL-SCNC: 100 MMOL/L (ref 98–107)
CLARITY UR: ABNORMAL
CO2 SERPL-SCNC: 28 MMOL/L (ref 20–31)
COLOR UR: YELLOW
CREAT SERPL-MCNC: 0.8 MG/DL (ref 0.5–0.9)
CRYSTALS URNS MICRO: ABNORMAL /HPF
EOSINOPHIL # BLD: 0.17 K/UL (ref 0–0.44)
EOSINOPHILS RELATIVE PERCENT: 3 % (ref 1–4)
EPI CELLS #/AREA URNS HPF: ABNORMAL /HPF (ref 0–5)
ERYTHROCYTE [DISTWIDTH] IN BLOOD BY AUTOMATED COUNT: 14.6 % (ref 11.8–14.4)
GFR, ESTIMATED: 69 ML/MIN/1.73M2
GLUCOSE SERPL-MCNC: 115 MG/DL (ref 70–99)
GLUCOSE UR STRIP-MCNC: NEGATIVE MG/DL
HCT VFR BLD AUTO: 37 % (ref 36.3–47.1)
HGB BLD-MCNC: 11.2 G/DL (ref 11.9–15.1)
HGB UR QL STRIP.AUTO: NEGATIVE
IMM GRANULOCYTES # BLD AUTO: 0.01 K/UL (ref 0–0.3)
IMM GRANULOCYTES NFR BLD: 0 %
KETONES UR STRIP-MCNC: NEGATIVE MG/DL
LEUKOCYTE ESTERASE UR QL STRIP: NEGATIVE
LYMPHOCYTES NFR BLD: 1.01 K/UL (ref 1.1–3.7)
LYMPHOCYTES RELATIVE PERCENT: 19 % (ref 24–43)
MCH RBC QN AUTO: 28.1 PG (ref 25.2–33.5)
MCHC RBC AUTO-ENTMCNC: 30.3 G/DL (ref 28.4–34.8)
MCV RBC AUTO: 93 FL (ref 82.6–102.9)
MONOCYTES NFR BLD: 0.54 K/UL (ref 0.1–1.2)
MONOCYTES NFR BLD: 10 % (ref 3–12)
MYOGLOBIN SERPL-MCNC: 43 NG/ML (ref 25–58)
NEUTROPHILS NFR BLD: 67 % (ref 36–65)
NEUTS SEG NFR BLD: 3.62 K/UL (ref 1.5–8.1)
NITRITE UR QL STRIP: NEGATIVE
NRBC BLD-RTO: 0 PER 100 WBC
PH UR STRIP: 6 [PH] (ref 5–8)
PLATELET # BLD AUTO: 227 K/UL (ref 138–453)
PMV BLD AUTO: 9.3 FL (ref 8.1–13.5)
POTASSIUM SERPL-SCNC: 4.2 MMOL/L (ref 3.7–5.3)
PROT UR STRIP-MCNC: NEGATIVE MG/DL
RBC # BLD AUTO: 3.98 M/UL (ref 3.95–5.11)
RBC # BLD: ABNORMAL 10*6/UL
RBC #/AREA URNS HPF: ABNORMAL /HPF (ref 0–2)
SODIUM SERPL-SCNC: 136 MMOL/L (ref 135–144)
SP GR UR STRIP: 1.03 (ref 1–1.03)
TROPONIN I SERPL HS-MCNC: 37 NG/L (ref 0–14)
TROPONIN I SERPL HS-MCNC: 38 NG/L (ref 0–14)
UROBILINOGEN UR STRIP-ACNC: NORMAL EU/DL (ref 0–1)
WBC #/AREA URNS HPF: ABNORMAL /HPF (ref 0–5)
WBC OTHER # BLD: 5.4 K/UL (ref 3.5–11.3)

## 2024-06-04 PROCEDURE — 87086 URINE CULTURE/COLONY COUNT: CPT

## 2024-06-04 PROCEDURE — 99285 EMERGENCY DEPT VISIT HI MDM: CPT

## 2024-06-04 PROCEDURE — 83874 ASSAY OF MYOGLOBIN: CPT

## 2024-06-04 PROCEDURE — 81001 URINALYSIS AUTO W/SCOPE: CPT

## 2024-06-04 PROCEDURE — 84484 ASSAY OF TROPONIN QUANT: CPT

## 2024-06-04 PROCEDURE — 99222 1ST HOSP IP/OBS MODERATE 55: CPT

## 2024-06-04 PROCEDURE — 85025 COMPLETE CBC W/AUTO DIFF WBC: CPT

## 2024-06-04 PROCEDURE — 71045 X-RAY EXAM CHEST 1 VIEW: CPT

## 2024-06-04 PROCEDURE — 80048 BASIC METABOLIC PNL TOTAL CA: CPT

## 2024-06-04 PROCEDURE — 93005 ELECTROCARDIOGRAM TRACING: CPT | Performed by: NURSE PRACTITIONER

## 2024-06-04 PROCEDURE — 85610 PROTHROMBIN TIME: CPT

## 2024-06-04 PROCEDURE — 87077 CULTURE AEROBIC IDENTIFY: CPT

## 2024-06-04 PROCEDURE — 70450 CT HEAD/BRAIN W/O DYE: CPT

## 2024-06-04 ASSESSMENT — PAIN - FUNCTIONAL ASSESSMENT: PAIN_FUNCTIONAL_ASSESSMENT: NONE - DENIES PAIN

## 2024-06-04 ASSESSMENT — ENCOUNTER SYMPTOMS
NAUSEA: 0
BACK PAIN: 0
DIARRHEA: 0
VOMITING: 0
COLOR CHANGE: 0
EYE PAIN: 0
SHORTNESS OF BREATH: 0
ABDOMINAL PAIN: 0

## 2024-06-05 PROBLEM — R41.82 ALTERED MENTAL STATUS: Status: ACTIVE | Noted: 2024-06-05

## 2024-06-05 PROBLEM — R44.3 HALLUCINATIONS: Status: ACTIVE | Noted: 2024-06-05

## 2024-06-05 PROBLEM — R41.89 COGNITIVE IMPAIRMENT: Status: ACTIVE | Noted: 2024-06-05

## 2024-06-05 PROBLEM — G93.40 ACUTE ENCEPHALOPATHY: Status: ACTIVE | Noted: 2024-06-05

## 2024-06-05 LAB
AMMONIA PLAS-SCNC: 19 UMOL/L (ref 11–51)
FOLATE SERPL-MCNC: >20 NG/ML (ref 4.8–24.2)
GLUCOSE BLD-MCNC: 92 MG/DL (ref 65–105)
INR PPP: 1.2
INR PPP: 1.3
PROTHROMBIN TIME: 15.5 SEC (ref 11.5–14.2)
PROTHROMBIN TIME: 15.8 SEC (ref 11.5–14.2)
T PALLIDUM AB SER QL IA: NONREACTIVE
TSH SERPL DL<=0.05 MIU/L-ACNC: 4.6 UIU/ML (ref 0.3–5)
VIT B12 SERPL-MCNC: 739 PG/ML (ref 232–1245)

## 2024-06-05 PROCEDURE — 6370000000 HC RX 637 (ALT 250 FOR IP)

## 2024-06-05 PROCEDURE — 6360000002 HC RX W HCPCS: Performed by: NURSE PRACTITIONER

## 2024-06-05 PROCEDURE — 99232 SBSQ HOSP IP/OBS MODERATE 35: CPT | Performed by: FAMILY MEDICINE

## 2024-06-05 PROCEDURE — G0378 HOSPITAL OBSERVATION PER HR: HCPCS

## 2024-06-05 PROCEDURE — 84443 ASSAY THYROID STIM HORMONE: CPT

## 2024-06-05 PROCEDURE — 2580000003 HC RX 258: Performed by: FAMILY MEDICINE

## 2024-06-05 PROCEDURE — 82140 ASSAY OF AMMONIA: CPT

## 2024-06-05 PROCEDURE — 82746 ASSAY OF FOLIC ACID SERUM: CPT

## 2024-06-05 PROCEDURE — 99222 1ST HOSP IP/OBS MODERATE 55: CPT | Performed by: PSYCHIATRY & NEUROLOGY

## 2024-06-05 PROCEDURE — 95816 EEG AWAKE AND DROWSY: CPT

## 2024-06-05 PROCEDURE — 2580000003 HC RX 258: Performed by: NURSE PRACTITIONER

## 2024-06-05 PROCEDURE — 96374 THER/PROPH/DIAG INJ IV PUSH: CPT

## 2024-06-05 PROCEDURE — 2580000003 HC RX 258

## 2024-06-05 PROCEDURE — 86780 TREPONEMA PALLIDUM: CPT

## 2024-06-05 PROCEDURE — 36415 COLL VENOUS BLD VENIPUNCTURE: CPT

## 2024-06-05 PROCEDURE — 96375 TX/PRO/DX INJ NEW DRUG ADDON: CPT

## 2024-06-05 PROCEDURE — 82607 VITAMIN B-12: CPT

## 2024-06-05 PROCEDURE — 82947 ASSAY GLUCOSE BLOOD QUANT: CPT

## 2024-06-05 RX ORDER — ATORVASTATIN CALCIUM 40 MG/1
40 TABLET, FILM COATED ORAL DAILY
Status: DISCONTINUED | OUTPATIENT
Start: 2024-06-05 | End: 2024-06-07 | Stop reason: HOSPADM

## 2024-06-05 RX ORDER — SODIUM CHLORIDE 0.9 % (FLUSH) 0.9 %
10 SYRINGE (ML) INJECTION PRN
Status: DISCONTINUED | OUTPATIENT
Start: 2024-06-05 | End: 2024-06-07 | Stop reason: HOSPADM

## 2024-06-05 RX ORDER — POTASSIUM CHLORIDE 7.45 MG/ML
10 INJECTION INTRAVENOUS PRN
Status: DISCONTINUED | OUTPATIENT
Start: 2024-06-05 | End: 2024-06-07 | Stop reason: HOSPADM

## 2024-06-05 RX ORDER — ONDANSETRON 4 MG/1
4 TABLET, ORALLY DISINTEGRATING ORAL EVERY 8 HOURS PRN
Status: DISCONTINUED | OUTPATIENT
Start: 2024-06-05 | End: 2024-06-07 | Stop reason: HOSPADM

## 2024-06-05 RX ORDER — LEVOTHYROXINE SODIUM 25 UG/1
25 TABLET ORAL DAILY
Status: DISCONTINUED | OUTPATIENT
Start: 2024-06-05 | End: 2024-06-07 | Stop reason: HOSPADM

## 2024-06-05 RX ORDER — POLYETHYLENE GLYCOL 3350 17 G/17G
17 POWDER, FOR SOLUTION ORAL DAILY PRN
Status: DISCONTINUED | OUTPATIENT
Start: 2024-06-05 | End: 2024-06-07 | Stop reason: HOSPADM

## 2024-06-05 RX ORDER — PANTOPRAZOLE SODIUM 40 MG/1
40 TABLET, DELAYED RELEASE ORAL
Status: DISCONTINUED | OUTPATIENT
Start: 2024-06-05 | End: 2024-06-07 | Stop reason: HOSPADM

## 2024-06-05 RX ORDER — ACETAMINOPHEN 325 MG/1
650 TABLET ORAL EVERY 6 HOURS PRN
Status: DISCONTINUED | OUTPATIENT
Start: 2024-06-05 | End: 2024-06-07 | Stop reason: HOSPADM

## 2024-06-05 RX ORDER — LORAZEPAM 2 MG/ML
0.25 INJECTION INTRAMUSCULAR ONCE
Status: COMPLETED | OUTPATIENT
Start: 2024-06-05 | End: 2024-06-05

## 2024-06-05 RX ORDER — CEPHALEXIN 500 MG/1
500 CAPSULE ORAL EVERY 12 HOURS SCHEDULED
Status: DISCONTINUED | OUTPATIENT
Start: 2024-06-05 | End: 2024-06-05

## 2024-06-05 RX ORDER — SODIUM CHLORIDE 9 MG/ML
INJECTION, SOLUTION INTRAVENOUS PRN
Status: DISCONTINUED | OUTPATIENT
Start: 2024-06-05 | End: 2024-06-07 | Stop reason: HOSPADM

## 2024-06-05 RX ORDER — 0.9 % SODIUM CHLORIDE 0.9 %
1000 INTRAVENOUS SOLUTION INTRAVENOUS ONCE
Status: COMPLETED | OUTPATIENT
Start: 2024-06-05 | End: 2024-06-05

## 2024-06-05 RX ORDER — SODIUM CHLORIDE 9 MG/ML
INJECTION, SOLUTION INTRAVENOUS CONTINUOUS
Status: DISCONTINUED | OUTPATIENT
Start: 2024-06-05 | End: 2024-06-07

## 2024-06-05 RX ORDER — MAGNESIUM SULFATE 1 G/100ML
1000 INJECTION INTRAVENOUS PRN
Status: DISCONTINUED | OUTPATIENT
Start: 2024-06-05 | End: 2024-06-07 | Stop reason: HOSPADM

## 2024-06-05 RX ORDER — WARFARIN SODIUM 5 MG/1
5 TABLET ORAL
Status: COMPLETED | OUTPATIENT
Start: 2024-06-05 | End: 2024-06-05

## 2024-06-05 RX ORDER — CITALOPRAM HYDROBROMIDE 20 MG/1
20 TABLET ORAL DAILY
Status: DISCONTINUED | OUTPATIENT
Start: 2024-06-05 | End: 2024-06-07 | Stop reason: HOSPADM

## 2024-06-05 RX ORDER — ONDANSETRON 2 MG/ML
4 INJECTION INTRAMUSCULAR; INTRAVENOUS EVERY 6 HOURS PRN
Status: DISCONTINUED | OUTPATIENT
Start: 2024-06-05 | End: 2024-06-07 | Stop reason: HOSPADM

## 2024-06-05 RX ORDER — POTASSIUM CHLORIDE 1500 MG/1
40 TABLET, EXTENDED RELEASE ORAL PRN
Status: DISCONTINUED | OUTPATIENT
Start: 2024-06-05 | End: 2024-06-07 | Stop reason: HOSPADM

## 2024-06-05 RX ORDER — SODIUM CHLORIDE 0.9 % (FLUSH) 0.9 %
5-40 SYRINGE (ML) INJECTION EVERY 12 HOURS SCHEDULED
Status: DISCONTINUED | OUTPATIENT
Start: 2024-06-05 | End: 2024-06-07 | Stop reason: HOSPADM

## 2024-06-05 RX ORDER — METOPROLOL SUCCINATE 25 MG/1
25 TABLET, EXTENDED RELEASE ORAL DAILY
Status: DISCONTINUED | OUTPATIENT
Start: 2024-06-05 | End: 2024-06-07 | Stop reason: HOSPADM

## 2024-06-05 RX ORDER — ACETAMINOPHEN 650 MG/1
650 SUPPOSITORY RECTAL EVERY 6 HOURS PRN
Status: DISCONTINUED | OUTPATIENT
Start: 2024-06-05 | End: 2024-06-07 | Stop reason: HOSPADM

## 2024-06-05 RX ADMIN — WATER 1000 MG: 1 INJECTION INTRAMUSCULAR; INTRAVENOUS; SUBCUTANEOUS at 22:08

## 2024-06-05 RX ADMIN — WARFARIN SODIUM 5 MG: 5 TABLET ORAL at 17:32

## 2024-06-05 RX ADMIN — LEVOTHYROXINE SODIUM 25 MCG: 25 TABLET ORAL at 11:53

## 2024-06-05 RX ADMIN — CITALOPRAM HYDROBROMIDE 20 MG: 20 TABLET ORAL at 11:54

## 2024-06-05 RX ADMIN — SODIUM CHLORIDE, PRESERVATIVE FREE 10 ML: 5 INJECTION INTRAVENOUS at 09:00

## 2024-06-05 RX ADMIN — SODIUM CHLORIDE 1000 ML: 9 INJECTION, SOLUTION INTRAVENOUS at 19:39

## 2024-06-05 RX ADMIN — METOPROLOL SUCCINATE 25 MG: 25 TABLET, EXTENDED RELEASE ORAL at 11:53

## 2024-06-05 RX ADMIN — LORAZEPAM 0.26 MG: 2 INJECTION INTRAMUSCULAR; INTRAVENOUS at 03:03

## 2024-06-05 RX ADMIN — ATORVASTATIN CALCIUM 40 MG: 40 TABLET, FILM COATED ORAL at 11:53

## 2024-06-05 RX ADMIN — SODIUM CHLORIDE: 9 INJECTION, SOLUTION INTRAVENOUS at 17:38

## 2024-06-05 ASSESSMENT — ENCOUNTER SYMPTOMS
NAUSEA: 0
VOMITING: 0
SHORTNESS OF BREATH: 0
WHEEZING: 0
CONSTIPATION: 0

## 2024-06-05 NOTE — PROGRESS NOTES
Warfarin Dosing - Pharmacy Consult Note  Consulting Provider: Dottie Walker CNP  Indication:  Atrial Fibrillation  Warfarin Dose prior to admission: 4mg QD (per Vivian RN at [facility])   Concurrent anticoagulants/antiplatelets: none  Significant Drug Interactions:  levothyroxine & citalopram (both home medications)  Recent Labs     06/04/24  0024 06/04/24  2055 06/05/24  0502   INR 1.3  --  1.2   HGB  --  11.2*  --    PLT  --  227  --      Date   INR    Dose  6/4         1.3         ?  6/5         1.2    Assessment/Plan  (Goal INR: 2 - 3)  INR sub-therapeutic. Pt was on 4mg daily prior to admission. Will give slightly boosted dose today to help get back in therapeutic range.   Give warfarin 5mg today. INR in the morning.    Active problem list reviewed.  INR orders are placed.  Chart reviewed for pertinent labs, drug/diet interactions, and past doses.  Documentation of patient's clinical condition was reviewed.    Pharmacy Dosing:  Pharmacy will continue to follow.

## 2024-06-05 NOTE — CONSULTS
Medical History:     Past Medical History:   Diagnosis Date    Antral ulcer, acute 12/18/2017    per EGD    Arthritis     CAD (coronary artery disease)     Dementia (HCC)     GERD (gastroesophageal reflux disease)     Hx of prosthetic heart valve     Hyperlipidemia     Hypertension     Paroxysmal atrial fibrillation (HCC)     Secondary hypercoagulable state (HCC) 04/29/2024        Past Surgical History:     Past Surgical History:   Procedure Laterality Date    AORTIC VALVE REPAIR      tavr    BACK SURGERY      broke her back    CAROTID ENDARTERECTOMY Right     COLON SURGERY      COLONOSCOPY      DENTAL SURGERY      top dentures    DENTAL SURGERY  05/21/2018    6 teeth removed    HERNIA REPAIR  06/20/2012    HERNIA REPAIR  06/18/2013    JOINT REPLACEMENT      PACEMAKER PLACEMENT      OK EGD TRANSORAL BIOPSY SINGLE/MULTIPLE N/A 12/18/2017    EGD BIOPSY performed by Richar Jhaveri MD at [facility] OR    TONSILLECTOMY      UPPER GASTROINTESTINAL ENDOSCOPY      UPPER GASTROINTESTINAL ENDOSCOPY  12/18/2017    1.5 cm antral ulcer,clean base    UPPER GASTROINTESTINAL ENDOSCOPY N/A 04/12/2018    EGD BIOPSY performed by Richar Jhaveri MD at [facility] OR    UPPER GASTROINTESTINAL ENDOSCOPY  04/12/2018    Ulcer appears to be almost healed.  There is some inflammation at the site of ulcer.        Medications Prior to Admission:     Prior to Admission medications    Medication Sig Start Date End Date Taking? Authorizing Provider   warfarin (COUMADIN) 2 MG tablet Take 2 tablets by mouth daily 4/30/24   Kristi Ko APRN - CNP   hydrocortisone (ANUSOL-HC) 2.5 % CREA rectal cream Place rectally 2 times daily 4/25/24   Joe Ledesma DO   psyllium 400 MG capsule Take 1 capsule by mouth daily 4/26/24 7/25/24  Joe Ledesma DO   levothyroxine (SYNTHROID) 25 MCG tablet Take 1 tablet by mouth daily 7/12/23   Provider, MD Arvin   pantoprazole (PROTONIX) 40 MG tablet TAKE ONE TABLET BY MOUTH EVERY MORNING BEFORE BREAKFAST  night, frequent reorientation, etc.   Reorientation strategies (family pictures, well lit room, family reassurance)  PT/OT.  May need placement.       We will continue to follow.      Electronically signed by Marcelino Jhaveri DO on 6/5/2024 at 9:10 AM      Marcelino Jhaveri DO  [facility] [place]  Neurology

## 2024-06-05 NOTE — ED PROVIDER NOTES
[facility] ED  eMERGENCY dEPARTMENT eNCOUnter      Pt Name: Sushila León  MRN: [id number]  Birthdate 6/17/1931  Date of evaluation: 6/4/2024  Provider: TASHA Carlton CNP    CHIEF COMPLAINT       Chief Complaint   Patient presents with    Altered Mental Status     Family concerned for UTI, changing brief more often, odor to urine, has some baseline confusion that has worsened, having hallucinations         HISTORY OF PRESENT ILLNESS  (Location/Symptom, Timing/Onset, Context/Setting, Quality, Duration, Modifying Factors, Severity.)   Sushila León is a 89 y.o. female who presents to the emergency department. Pt presents to the ED for AMS. Family states she started having increased confusion and hallucinations today. Family is concerned for a UTI. Pt denies pain. Denies dizziness, nausea, CP, SOB. Denies abd pain. Pt states she feels fine. Pt does appear confused. Pt seeing objects in the room that are not present. Family states pt is typically fairly independent. She does have an ankle fracture at this time and has been nonweightbearing on the RLE since the injury on 4/28/24. She lives with family.     Nursing Notes were reviewed.    ALLERGIES     Aspirin    CURRENT MEDICATIONS       Previous Medications    ATORVASTATIN (LIPITOR) 40 MG TABLET    Take 1 tablet by mouth daily    CALCIUM CARBONATE-VITAMIN D (CALTRATE 600+D PO)    Take 1 tablet by mouth daily    CITALOPRAM (CELEXA) 20 MG TABLET    TAKE ONE TABLET BY MOUTH DAILY    HYDROCORTISONE (ANUSOL-HC) 2.5 % CREA RECTAL CREAM    Place rectally 2 times daily    LEVOTHYROXINE (SYNTHROID) 25 MCG TABLET    Take 1 tablet by mouth daily    METOPROLOL SUCCINATE (TOPROL XL) 25 MG EXTENDED RELEASE TABLET    TAKE ONE TABLET BY MOUTH DAILY    MULTIPLE VITAMINS-MINERALS (CENTRUM SILVER ADULT 50+) TABS    Take 1 tablet by mouth daily.    PANTOPRAZOLE (PROTONIX) 40 MG TABLET    TAKE ONE TABLET BY MOUTH EVERY MORNING BEFORE BREAKFAST

## 2024-06-05 NOTE — PROGRESS NOTES
Pt admitted to room 2020 from ER.  Oriented to room and call light/tv controls.  Bed in lowest position, wheels locked, 2/4 side rails up  Call light in reach, room free of clutter, adequate lighting provided.    [] Medication Reconciliation was completed and the patient's home medication list was verified. The Med List Status has been marked \"Complete\". The following sources were used to assist with Medication Reconciliation:    [] Patient had a list of medications which was transcribed into the EHR.    [] Patient provided bottles of their medications    [] Home medications reviewed and confirmed with     [] Contacted patient's pharmacy to confirm home medications    [] Contacted patient's physician office to confirm home medications    [] Medical Records from another facility and/or Care Everywhere were reviewed    OR    [x] There are one or more home medications that need clarification before Medication Reconciliation can be completed. The Med List Status has been marked as In Progress. To assist with Home Medication Reconciliation the following actions have been taken:    [] Pharmacy medication reconciliation service requested. (Note: This can be done by sending a Perfect Serve message to [facility] Rec Pharmacist or by phoning [phone].)    [x] Family requested to bring medications into the hospital    [] Family requested to call hospital with medication list    [] Message left with physician office    [] Request for medical records made to     [] Other     0240 Pt attempting to get out of bed multiple times and pull at IV. RN attempted to reorient pt but unsuccessful. RN messaged Darío NP to ask for medication to help calm pt down. New order received for ativan x 1. See orders/MAR.

## 2024-06-05 NOTE — ED NOTES
L AC IV site loosely wrapped in kerlix to help prevent pt from removing IV as she did her previous one.

## 2024-06-05 NOTE — ED PROVIDER NOTES
eMERGENCY dEPARTMENT eNCOUnter   Independent Attestation     Pt Name: Sushila León  MRN: [id number]  Birthdate 6/17/1931  Date of evaluation: 6/4/24     Sushila León is a 89 y.o. female with CC: Altered Mental Status (Family concerned for UTI, changing brief more often, odor to urine, has some baseline confusion that has worsened, having hallucinations)      Based on the medical record the care appears appropriate.  I was personally available for consultation in the Emergency Department.    Darren Taylor DO  Attending Emergency Physician                  Darren Taylor DO  06/04/24 [id number]

## 2024-06-05 NOTE — ED NOTES
Pt presents to ED via [facility] for AMS and concern for UTI. EMS reports family called the squad today. Son reports the pt has been having a foul odor to her urine and has needed more frequent brief changes. Pt has confusion at baseline, but son reports it has worsened. Reports she has been having visual hallucinations, seeing things on the walls and ceiling that are not there. Pt reports she can't walk at this time d/t leg injuries. Has an air cast boot in place on the R foot. Pt given warm blanket for comfort. Bed locked in low position, side rails x2.  Son at bedside.

## 2024-06-05 NOTE — H&P
Myoglobin 43 25 - 58 ng/mL   EKG 12 Lead    Collection Time: 06/04/24  9:00 PM   Result Value Ref Range    Ventricular Rate 60 BPM    Atrial Rate 50 BPM    QRS Duration 148 ms    Q-T Interval 480 ms    QTc Calculation (Bazett) 480 ms    R Axis -42 degrees    T Axis 59 degrees   Urinalysis with Microscopic    Collection Time: 06/04/24  9:15 PM   Result Value Ref Range    Color, UA Yellow Yellow    Turbidity UA Cloudy (A) Clear    Glucose, Ur NEGATIVE NEGATIVE mg/dL    Bilirubin, Urine NEGATIVE NEGATIVE    Ketones, Urine NEGATIVE NEGATIVE mg/dL    Specific Gravity, UA 1.033 (H) 1.005 - 1.030    Urine Hgb NEGATIVE NEGATIVE    pH, Urine 6.0 5.0 - 8.0    Protein, UA NEGATIVE NEGATIVE mg/dL    Urobilinogen, Urine Normal 0.0 - 1.0 EU/dL    Nitrite, Urine NEGATIVE NEGATIVE    Leukocyte Esterase, Urine NEGATIVE NEGATIVE    WBC, UA 0 TO 2 0 - 5 /HPF    RBC, UA 0 TO 2 0 - 2 /HPF    Casts UA 2 TO 5 /LPF    Casts UA HYALINE /LPF    Crystals, UA 2 TO 5 CALCIUM OXALATE (A) None /HPF    Epithelial Cells, UA 2 TO 5 0 - 5 /HPF    Bacteria, UA MANY (A) None   Troponin    Collection Time: 06/04/24 11:00 PM   Result Value Ref Range    Troponin, High Sensitivity 37 (H) 0 - 14 ng/L       Imaging/Diagnostics:  CT HEAD WO CONTRAST    Result Date: 6/4/2024  No acute intracranial abnormality.     XR CHEST PORTABLE    Result Date: 6/4/2024  No acute abnormality.       Assessment :      Hospital Problems             Last Modified POA    * (Principal) Confusion 6/4/2024 Yes    Atrial fibrillation (HCC) 6/5/2024 Yes    Primary hypertension (Chronic) 6/5/2024 Yes    Mixed hyperlipidemia 6/5/2024 Yes    S/P TAVR (transcatheter aortic valve replacement) 6/5/2024 Yes       Plan:     Patient status observation in the  Med/Surge    Confusion secondary to an unknown source in the setting of dementia  Neurology on consult-appreciate additional recommendations  Urinalysis negative, chest x-ray negative, CT brain negative    Paroxysmal atrial fibrillation  and status post TAVR  Chronically anticoagulated with Coumadin  Pharmacy to dose Coumadin with INR goal 2-3  Monitor for signs of bleeding, fall precautions    Primary hypertension and mixed hyperlipidemia  Continue Toprol-XL 25 mg daily  Continue Lipitor 40 mg daily    Consultations:   IP CONSULT TO INTERNAL MEDICINE  IP CONSULT TO NEUROLOGY  PHARMACY TO DOSE WARFARIN    TASHA Patel CNP  6/5/2024  1:02 AM    Copy sent to Elis Mejia, APRN - CNP

## 2024-06-05 NOTE — CARE COORDINATION
Case Management Assessment  Initial Evaluation    Date/Time of Evaluation: 6/5/2024 4:07 PM  Assessment Completed by: Silva Hopson    If patient is discharged prior to next notation, then this note serves as note for discharge by case management.    Patient Name: Sushila León                   YOB: 1931  Diagnosis: Hallucinations [R44.3]  Confusion [R41.0]  Altered mental status, unspecified altered mental status type [R41.82]                   Date / Time: 6/4/2024  8:22 PM    Patient Admission Status: Observation   Readmission Risk (Low < 19, Mod (19-27), High > 27): Readmission Risk Score: 16.5    Current PCP: Elis Tse APRN - CNP  PCP verified by CM? No    Chart Reviewed: Yes      History Provided by:    Patient Orientation: Unable to Assess    Patient Cognition: Severely Impaired    Hospitalization in the last 30 days (Readmission):  No    If yes, Readmission Assessment in CM Navigator will be completed.    Advance Directives:      Code Status: DNR-CC   Patient's Primary Decision Maker is: Legal Next of Kin    Primary Decision Maker: Eliel León - Child - [phone]    Discharge Planning:    Patient lives with: Children (son Madi and his wife live with her. Son Eliel is POA) Type of Home: Trailer/Mobile Home  Primary Care Giver:    Patient Support Systems include: Children, Home Care Staff   Current Financial resources: Medicare  Current community resources: None  Current services prior to admission: Durable Medical Equipment, Home Care            Current DME: Bedside Commode, Walker, Wheelchair, Shower Chair, Other (Comment) (hospital bed, lift)            Type of Home Care services:  PT, Nursing Services (family and pt would benefit from aide and  services)    ADLS  Prior functional level: Assistance with the following:, Other (see comment) (total care)  Current functional level: Other (see comment) (total care)    PT AM-PAC:   /24  OT AM-PAC:    well developed, well nourished , in no acute distress , ambulating without difficulty , normal communication ability evaluated now.  Coumadin home med for Afib.    Eliel has been working on finding private pay aides; may benefit from  in the home.  Eliel is determined to take his mom home as he promised her she could die at home.  KIANA Dias updated.      The Plan for Transition of Care is related to the following treatment goals of Hallucinations [R44.3]  Confusion [R41.0]  Altered mental status, unspecified altered mental status type [R41.82]    The Patient and/or Patient Representative Agree with the Discharge Plan?  Goal is home and to continue [employer].  Reassess feaseability of that as pt progresses.    Silva Hopson  Case Management Department  Ph:  Fax:

## 2024-06-05 NOTE — PROGRESS NOTES
[facility]  Office: [phone]  Elvin Rubio DO, Jericho Quan, DO, Joe Ledesma DO, Shamar Carrasco, DO, Sabine Andersen MD, Reshma Nicholson MD, Lamberto Barbosa MD, Cynthia Sheridan MD,  Aneesh Flores MD, Caroline Carpenter MD, Jc Silverio MD,  Misty Jones DO, Kadeem Gama MD, Endy Jim MD, Lasha Rubio DO, Caron Toro MD,  Trevor Valadez DO, Tosin Dotson MD, Alejandra Mcelroy MD, Honey Portillo MD, Dasia Whitley MD,  Santhosh Martinez MD, Tameka Boateng MD, Doc Marquez MD, Lion Deutsch MD, Lorenzo Boateng MD, Una Rivero MD, Marcus Dexter DO, Osmany Balderrama DO, Karson Garner MD,  Edwin Pickering MD, Shirley Waterhouse, CNP,  Kristi Ko CNP, Jani To, CNP,  Nataliia Osman, DNP, Lorenza Mcfarland, CNP, Joselyn Murray, CNP, Christina Chanel CNP, Loan Lindsay, CNP, Dina iRvas, PA-C, Patricia Jules PA-C, Elizabeth Tony, CNP, Karine Acosta, CNP, Dottie Walker, CNP, Marilee Barrera, CNP, Antonia Doherty, CNP, Ghislaine Atkinson, CNS, Mikaela Tsai, CNP, Sybil Cobian CNP, Tracy Schwab, CNP       [facility]      Daily Progress Note     Admit Date: 6/4/2024  Bed/Room No.  2020/2020-01  Admitting Physician : Lamberto Barbosa MD  Code Status :Full Code  Hospital Day:  LOS: 0 days   Chief Complaint:     Chief Complaint   Patient presents with    Altered Mental Status     Family concerned for UTI, changing brief more often, odor to urine, has some baseline confusion that has worsened, having hallucinations     Principal Problem:    Confusion  Active Problems:    Atrial fibrillation (HCC)    Primary hypertension    Mixed hyperlipidemia    S/P TAVR (transcatheter aortic valve replacement)  Resolved Problems:    * No resolved hospital problems. *    Subjective :        Interval History/Significant events :  06/05/24    Patient remains confused.  Patient needs assistance in feeding.  She does not have any behavioral changes.  Patient is pleasant.  She is moving all  extremities without focal weakness.  Speech is normal.  Vitals - Temp:  afebrile ,  Heart Rate - Normal Cardiac Rhythm - regular rate and rhythm Blood Pressure mostly controlled  Labs / test results -      Nursing notes , Consults notes reviewed. Overnight events and updates discussed with Nursing staff .   Background History:         Sushila León is 89 y.o. female  Who was admitted to the hospital on 6/4/2024 for treatment of Confusion.   History taken from patient's son Eliel.  Patient was brought to emergency room by family after she was having change in mental status and confusion with hallucination.  She had gradual decline in health for last 2 months after she had a fall with bimalleolar fracture.  Family chose conservative treatment at that time and patient was discharged to [place].  Patient had returned back from [place] for weeks before and was living with her son.  She started having confusion and was having active hallucination.  Patient did not any fever, focal weakness, speech difficulty.  Evaluation on presentation showed elevated BP. Lab work up showed negative CT head, normal WBC, negative  CXR.  Urine showed bacteria.  Patient has underlying history of Belser heart disease with TAVR in 2018, atrial flutter/fibrillation, presence of permanent pacemaker.  Patient is on long-term anticoagulation with Coumadin.  She is allergic to aspirin.    PMH:  Past Medical History:   Diagnosis Date    Antral ulcer, acute 12/18/2017    per EGD    Arthritis     CAD (coronary artery disease)     Dementia (HCC)     GERD (gastroesophageal reflux disease)     Hx of prosthetic heart valve     Hyperlipidemia     Hypertension     Paroxysmal atrial fibrillation (HCC)     Secondary hypercoagulable state (HCC) 04/29/2024      Allergies:   Allergies   Allergen Reactions    Aspirin       Medications :  atorvastatin, 40 mg, Oral, Daily  citalopram, 20 mg, Oral, Daily  levothyroxine, 25 mcg, Oral,

## 2024-06-05 NOTE — DISCHARGE INSTR - COC
Continuity of Care Form    Patient Name: Sushila León   :  1931  MRN:  [id number]    Admit date:  2024  Discharge date:  ***    Code Status Order: DNR-CC   Advance Directives:     Admitting Physician:  Lamberto Barbosa MD  PCP: Elis Tse, APRN - CNP    Discharging Nurse: ***  Discharging Hospital Unit/Room#:   Discharging Unit Phone Number: ***    Emergency Contact:   Extended Emergency Contact Information  Primary Emergency Contact: Eliel León   [facility]  Home Phone: [phone]  Relation: Child    Past Surgical History:  Past Surgical History:   Procedure Laterality Date    AORTIC VALVE REPAIR      tavr    BACK SURGERY      broke her back    CAROTID ENDARTERECTOMY Right     COLON SURGERY      COLONOSCOPY      DENTAL SURGERY      top dentures    DENTAL SURGERY  2018    6 teeth removed    HERNIA REPAIR  2012    HERNIA REPAIR  2013    JOINT REPLACEMENT      PACEMAKER PLACEMENT      ID EGD TRANSORAL BIOPSY SINGLE/MULTIPLE N/A 2017    EGD BIOPSY performed by Richar Jhaveri MD at [facility] OR    TONSILLECTOMY      UPPER GASTROINTESTINAL ENDOSCOPY      UPPER GASTROINTESTINAL ENDOSCOPY  2017    1.5 cm antral ulcer,clean base    UPPER GASTROINTESTINAL ENDOSCOPY N/A 2018    EGD BIOPSY performed by Richar Jhaveri MD at [facility] OR    UPPER GASTROINTESTINAL ENDOSCOPY  2018    Ulcer appears to be almost healed.  There is some inflammation at the site of ulcer.       Immunization History:   Immunization History   Administered Date(s) Administered    Influenza Virus Vaccine 10/20/2014, 10/06/2015, 10/10/2016, 10/11/2018    Influenza, High Dose (Fluzone 65 yrs and older) 10/20/2014, 10/06/2015, 10/10/2016    Pneumococcal, PCV-13, PREVNAR 13, (age 6w+), IM, 0.5mL 2015    Pneumococcal, PPSV23, PNEUMOVAX 23, (age 2y+), SC/IM, 0.5mL 2016    TDaP, ADACEL (age 10y-64y), BOOSTRIX (age 10y+), IM, 0.5mL 04/10/2017

## 2024-06-06 LAB
EKG ATRIAL RATE: 50 BPM
EKG Q-T INTERVAL: 480 MS
EKG QRS DURATION: 148 MS
EKG QTC CALCULATION (BAZETT): 480 MS
EKG R AXIS: -42 DEGREES
EKG T AXIS: 59 DEGREES
EKG VENTRICULAR RATE: 60 BPM
INR PPP: 1.2
MICROORGANISM SPEC CULT: NORMAL
PROTHROMBIN TIME: 15 SEC (ref 11.5–14.2)
SPECIMEN DESCRIPTION: NORMAL

## 2024-06-06 PROCEDURE — 36415 COLL VENOUS BLD VENIPUNCTURE: CPT

## 2024-06-06 PROCEDURE — 97163 PT EVAL HIGH COMPLEX 45 MIN: CPT

## 2024-06-06 PROCEDURE — 85610 PROTHROMBIN TIME: CPT

## 2024-06-06 PROCEDURE — 2580000003 HC RX 258: Performed by: NURSE PRACTITIONER

## 2024-06-06 PROCEDURE — 95816 EEG AWAKE AND DROWSY: CPT | Performed by: PSYCHIATRY & NEUROLOGY

## 2024-06-06 PROCEDURE — 97535 SELF CARE MNGMENT TRAINING: CPT

## 2024-06-06 PROCEDURE — 99232 SBSQ HOSP IP/OBS MODERATE 35: CPT | Performed by: PSYCHIATRY & NEUROLOGY

## 2024-06-06 PROCEDURE — 99239 HOSP IP/OBS DSCHRG MGMT >30: CPT | Performed by: FAMILY MEDICINE

## 2024-06-06 PROCEDURE — 6360000002 HC RX W HCPCS: Performed by: NURSE PRACTITIONER

## 2024-06-06 PROCEDURE — G0378 HOSPITAL OBSERVATION PER HR: HCPCS

## 2024-06-06 PROCEDURE — 96376 TX/PRO/DX INJ SAME DRUG ADON: CPT

## 2024-06-06 PROCEDURE — 97167 OT EVAL HIGH COMPLEX 60 MIN: CPT

## 2024-06-06 PROCEDURE — 6370000000 HC RX 637 (ALT 250 FOR IP)

## 2024-06-06 PROCEDURE — 6370000000 HC RX 637 (ALT 250 FOR IP): Performed by: PSYCHIATRY & NEUROLOGY

## 2024-06-06 PROCEDURE — 97530 THERAPEUTIC ACTIVITIES: CPT

## 2024-06-06 RX ORDER — WARFARIN SODIUM 5 MG/1
5 TABLET ORAL
Status: COMPLETED | OUTPATIENT
Start: 2024-06-06 | End: 2024-06-06

## 2024-06-06 RX ORDER — QUETIAPINE FUMARATE 25 MG/1
12.5 TABLET, FILM COATED ORAL NIGHTLY
Qty: 60 TABLET | Refills: 3 | Status: SHIPPED | OUTPATIENT
Start: 2024-06-06

## 2024-06-06 RX ADMIN — WARFARIN SODIUM 5 MG: 5 TABLET ORAL at 17:57

## 2024-06-06 RX ADMIN — WATER 1000 MG: 1 INJECTION INTRAMUSCULAR; INTRAVENOUS; SUBCUTANEOUS at 20:31

## 2024-06-06 RX ADMIN — Medication 12.5 MG: at 20:31

## 2024-06-06 NOTE — PROGRESS NOTES
RN unsuccessful with IV restart. RN called House supervisor for ultrasound IV. House supervisor attempted IV restart but unsuccessful. House supervisor will let the dayshift supervisor know to restart pt's IV.

## 2024-06-06 NOTE — PROGRESS NOTES
[facility]  Office: [phone]  Elvin Rubio DO, Jericho Quan, DO, Joe Ledesma DO, Shamar Carrasco, DO, Sabine Andersen MD, Reshma Nicholson MD, Lamberto Barbosa MD, Cynthia Sheridan MD,  Aneesh Flores MD, Caroline Carpenter MD, Jc Silverio MD,  Misty Jones DO, Kadeem Gama MD, Endy Jim MD, Lasha Rubio DO, Caron Toro MD,  Trevor Valadez DO, Tosin Dotson MD, Alejandra Mcelroy MD, Honey Portillo MD, Dasia Whitley MD,  Santhosh Martinez MD, Tameka Boateng MD, Doc Marquez MD, Lion Deutsch MD, Lorenzo Boateng MD, Una Rivero MD, Marcus Dexter DO, Osmany Balderrama DO, Karson Garner MD,  Edwin Pickering MD, Shirley Waterhouse, CNP,  Kristi Ko CNP, Jani To, CNP,  Nataliia Osman, DNP, Lorenza Mcfarland, CNP, Joselyn Murray, CNP, Christina Chanel, CNP, Loan Lindsay, CNP, Dina Rivas, PA-C, Patricia Jules PA-C, Elizabeth Tony, CNP, Karine Acosta, CNP, Dottie Walker, CNP, Marilee Barrera, CNP, Antonia Doherty, CNP, Ghislaine Atkinson, CNS, Mikaela Tsai, CNP, Sybil Cobian CNP, Tracy Schwab, CNP       [facility]      Daily Progress Note     Admit Date: 6/4/2024  Bed/Room No.  2020/2020-01  Admitting Physician : Lamberto Barbosa MD  Code Status :DNR-CC  Hospital Day:  LOS: 0 days   Chief Complaint:     Chief Complaint   Patient presents with    Altered Mental Status     Family concerned for UTI, changing brief more often, odor to urine, has some baseline confusion that has worsened, having hallucinations     Principal Problem:    Confusion  Active Problems:    Atrial fibrillation (HCC)    Primary hypertension    Mixed hyperlipidemia    S/P TAVR (transcatheter aortic valve replacement)    Acute encephalopathy    Cognitive impairment    Altered mental status    Hallucinations  Resolved Problems:    * No resolved hospital problems. *    Subjective :        Interval History/Significant events :  06/06/24    Patient is oriented to self only.  Patient had uneventful  and regular rhythm.      Pulses:           Dorsalis pedis pulses are 2+ on the right side and 2+ on the left side.      Heart sounds: Normal heart sounds. No murmur heard.  Pulmonary:      Effort: Pulmonary effort is normal.      Breath sounds: Normal breath sounds. No wheezing or rales.   Abdominal:      Palpations: Abdomen is soft. There is no mass.      Tenderness: There is no abdominal tenderness.   Musculoskeletal:      Cervical back: Full passive range of motion without pain and neck supple.   Lymphadenopathy:      Head:      Right side of head: No submandibular adenopathy.      Left side of head: No submandibular adenopathy.      Cervical: No cervical adenopathy.   Skin:     General: Skin is warm.   Neurological:      Mental Status: She is disoriented.      Motor: No tremor.      Comments: Awake, Oriented to self only.    Psychiatric:         Behavior: Behavior is slowed. Behavior is cooperative.         Cognition and Memory: Cognition is impaired. Memory is impaired.           Laboratory findings:    Recent Labs     06/04/24  0024 06/04/24 2055 06/05/24  0502 06/06/24  0513   WBC  --  5.4  --   --    HGB  --  11.2*  --   --    HCT  --  37.0  --   --    PLT  --  227  --   --    INR 1.3  --  1.2 1.2       Recent Labs     06/04/24  2055      K 4.2      CO2 28   GLUCOSE 115*   BUN 17   CREATININE 0.8   CALCIUM 9.2       Recent Labs     06/05/24  0502 06/05/24  1055   TSH 4.60  --    AMMONIA  --  19          Protein, UA   Date Value Ref Range Status   06/04/2024 NEGATIVE NEGATIVE mg/dL Final     RBC, UA   Date Value Ref Range Status   06/04/2024 0 TO 2 0 - 2 /HPF Final     Bacteria, UA   Date Value Ref Range Status   06/04/2024 MANY (A) None Final     Nitrite, Urine   Date Value Ref Range Status   06/04/2024 NEGATIVE NEGATIVE Final     WBC, UA   Date Value Ref Range Status   06/04/2024 0 TO 2 0 - 5 /HPF Final     Leukocyte Esterase, Urine   Date Value Ref Range Status   06/04/2024 NEGATIVE NEGATIVE

## 2024-06-06 NOTE — CARE COORDINATION
Social Work-Spoke with son regarding dc plans. He would like to take patient home with hospice care. Offered choice of hospice providers. [facility] is first choice. Sent referral. Updated [facility]. Rowdy

## 2024-06-06 NOTE — PROGRESS NOTES
Pt refusing to take oral meds. RN messaged Waterhouse NP to ask if IV antibiotic can be ordered while pt refusing PO meds. New order received for Rocephin 1gm Q24hrs. See orders/MAR.

## 2024-06-06 NOTE — CARE COORDINATION
Social Work-[facility] will meet with son at [facility] tomorrow at 9AM. Rowdy

## 2024-06-06 NOTE — PROGRESS NOTES
Occupational Therapy  Facility/Department: [facility] MED SURG  Occupational Therapy Initial Assessment    Name: Sushila León  : 1931  MRN: [id number]  Date of Service: 2024    ELMO Valadez reports patient is medically stable for therapy treatment this date.    Chart reviewed prior to treatment and patient is agreeable for therapy.  All lines intact and patient positioned comfortably at end of treatment.  All patient needs addressed prior to ending therapy session.      Discharge Recommendations:  Patient would benefit from continued therapy after discharge  Pt currently functioning below baseline.  Recommend daily inpatient skilled therapy at time of discharge to maximize long term outcomes and prevent re-admission. Please refer to AM-PAC score for current level of function.    OT Equipment Recommendations  Equipment Needed: Yes  Mobility Devices: ADL Assistive Devices  ADL Assistive Devices: Emergency Alert System;Long-handled Shoe Horn;Long-handled Sponge;Reacher;Sock-Aid Hard       Patient Diagnosis(es): The primary encounter diagnosis was Altered mental status, unspecified altered mental status type. A diagnosis of Hallucinations was also pertinent to this visit.  Past Medical History:  has a past medical history of Antral ulcer, acute, Arthritis, CAD (coronary artery disease), Dementia (HCC), GERD (gastroesophageal reflux disease), Hx of prosthetic heart valve, Hyperlipidemia, Hypertension, Paroxysmal atrial fibrillation (HCC), and Secondary hypercoagulable state (HCC).  Past Surgical History:  has a past surgical history that includes Colonoscopy; hernia repair (2012); joint replacement; Tonsillectomy; Dental surgery; back surgery; Upper gastrointestinal endoscopy; Colon surgery; Carotid endarterectomy (Right); Upper gastrointestinal endoscopy (2017); hernia repair (2013); pr egd transoral biopsy single/multiple (N/A, 2017); Upper gastrointestinal endoscopy (N/A, 2018);  assistance  Problem Solving: Decreased awareness of errors;Assistance required to correct errors made;Assistance required to identify errors made;Assistance required to implement solutions;Assistance required to generate solutions  Insights: Not aware of deficits  Initiation: Requires cues for all  Sequencing: Requires cues for all  Orientation  Overall Orientation Status: Impaired  Orientation Level: Disoriented to situation;Disoriented to time;Disoriented to place;Oriented to person                  Education Given To: Patient  Education Provided: Role of Therapy;Transfer Training;Equipment;Plan of Care;Energy Conservation;Fall Prevention Strategies;ADL Adaptive Strategies;Orientation  Education Provided Comments: fall prevention/call light use, EC/WS tech, safety in function, transfer tech, RW use/safety, benefits of being OOB, pursed lip breathing tech, proper bed mobility tech, recommendations for discharge/AE  Education Method: Verbal  Barriers to Learning: Cognition  Education Outcome: Continued education needed                          AM-PAC - ADL  AM-PAC Daily Activity - Inpatient   How much help is needed for putting on and taking off regular lower body clothing?: Total  How much help is needed for bathing (which includes washing, rinsing, drying)?: A Lot  How much help is needed for toileting (which includes using toilet, bedpan, or urinal)?: Total  How much help is needed for putting on and taking off regular upper body clothing?: A Lot  How much help is needed for taking care of personal grooming?: A Little  How much help for eating meals?: A Little  AM-PAC Inpatient Daily Activity Raw Score: 12  AM-PAC Inpatient ADL T-Scale Score : 30.6  ADL Inpatient CMS 0-100% Score: 66.57  ADL Inpatient CMS G-Code Modifier : CL           Goals  Short Term Goals  Time Frame for Short Term Goals: By discharge, pt to demo  Short Term Goal 1: ADL transfers to Min A with use of AD while maintaining R LE NWB.  Short Term

## 2024-06-06 NOTE — PROCEDURES
PROCEDURE NOTE  Date: 6/6/2024   Name: Sushila León  YOB: 1931    Procedures    EEG REPORT       Patient: Sushila León Age: 89 y.o.  MRN: [id number]      Referring Provider: No ref. provider found    History: This routine 30 minute scalp EEG was recorded with video- monitoring for a 89 y.o.. female who presented with encephalopathy. This EEG was performed to evaluate for focal and epileptiform abnormalities.     Sushila León   Current Facility-Administered Medications   Medication Dose Route Frequency Provider Last Rate Last Admin    warfarin (COUMADIN) tablet 5 mg  5 mg Oral Once Dottie Walker APRN - CNP        atorvastatin (LIPITOR) tablet 40 mg (Patient Supplied)  40 mg Oral Daily Dottie Walker APRN - CNP   40 mg at 06/05/24 1153    citalopram (CELEXA) tablet 20 mg (Patient Supplied)  20 mg Oral Daily Dottie Walker APRN - CNP   20 mg at 06/05/24 1154    levothyroxine (SYNTHROID) tablet 25 mcg (Patient Supplied)  25 mcg Oral Daily Dottie Walker APRN - CNP   25 mcg at 06/05/24 1153    metoprolol succinate (TOPROL XL) extended release tablet 25 mg (Patient Supplied)  25 mg Oral Daily Dottie Walker APRN - CNP   25 mg at 06/05/24 1153    pantoprazole (PROTONIX) tablet 40 mg  40 mg Oral QAM AC Dottie Walker APRN - CNP        psyllium capsule 400 mg  400 mg Oral Daily Dottie Walker APRN - CNP        sodium chloride flush 0.9 % injection 5-40 mL  5-40 mL IntraVENous 2 times per day Dottie Walker APRN - CNP   10 mL at 06/05/24 0900    sodium chloride flush 0.9 % injection 10 mL  10 mL IntraVENous PRN Dottie Walker APRN - CNP        0.9 % sodium chloride infusion   IntraVENous PRN Dottie Walker APRN - CNP        potassium chloride (KLOR-CON M) extended release tablet 40 mEq  40 mEq Oral PRN Dottie Walker APRN - CNP        Or    potassium bicarb-citric acid (EFFER-K) effervescent tablet 40 mEq  40 mEq Oral PRN Aaron  not performed.     The EKG channel demonstrated a normal sinus rhythm.    Interpretation  This EEG was abnormal due to disorganized and slow background in polymorphic delta and theta frequency.      Clinical correlation  This EEG was abnormal. Disorganized and slow background suggested mild to moderate encephalopathy of non specific etiology.     Eric Flower MD  [facility] [place]  Neurology

## 2024-06-06 NOTE — PLAN OF CARE
Problem: Confusion  Goal: Confusion, delirium, dementia, or psychosis is improved or at baseline  Description: INTERVENTIONS:  1. Assess for possible contributors to thought disturbance, including medications, impaired vision or hearing, underlying metabolic abnormalities, dehydration, psychiatric diagnoses, and notify attending LIP  2. [place] high risk fall precautions, as indicated  3. Provide frequent short contacts to provide reality reorientation, refocusing and direction  4. Decrease environmental stimuli, including noise as appropriate  5. Monitor and intervene to maintain adequate nutrition, hydration, elimination, sleep and activity  6. If unable to ensure safety without constant attention obtain sitter and review sitter guidelines with assigned personnel  7. Initiate Psychosocial CNS and Spiritual Care consult, as indicated  Outcome: Progressing  Flowsheets (Taken 6/5/2024 8348)  Effect of thought disturbance (confusion, delirium, dementia, or psychosis) are managed with adequate functional status:   Assess for contributors to thought disturbance, including medications, impaired vision or hearing, underlying metabolic abnormalities, dehydration, psychiatric diagnoses, notify LIP   [place] high risk fall precautions, as indicated   Provide frequent short contacts to provide reality reorientation, refocusing and direction   Decrease environmental stimuli, including noise as appropriate   Monitor and intervene to maintain adequate nutrition, hydration, elimination, sleep and activity

## 2024-06-06 NOTE — PROGRESS NOTES
Physical Therapy  Facility/Department: [facility] MED SURG  Physical Therapy Initial Assessment    Name: Sushila León  : 1931  MRN: [id number]  Date of Service: 2024  ELMO Valadez reports patient is medically stable for therapy treatment this date.    Chart reviewed prior to treatment and patient is agreeable for therapy.  All lines intact and patient positioned comfortably at end of treatment.  All patient needs addressed prior to ending therapy session.      Discharge Recommendations:  Patient would benefit from continued therapy after discharge   Pt currently functioning below baseline.  Recommend daily inpatient skilled therapy at time of discharge to maximize long term outcomes and prevent re-admission. Please refer to AM-PAC score for current level of function.    Per H&P: \"Sushila León is a 89 y.o. Non- / non  female who presents with Altered Mental Status (Family concerned for UTI, changing brief more often, odor to urine, has some baseline confusion that has worsened, having hallucinations) and is admitted to the hospital for the management of Confusion.  Patient presents emergency department with complaints of worsening confusion and altered mental status. She does have a past medical history significant for dementia without follow-up with neurology.  Upon arrival to the emergency department her blood pressure returned to 163/79.    Blood work returned with WBC 5.4, hemoglobin 11.2, creatinine 0.8. A urinalysis was obtained which returned negative. CT brain was performed without any acute findings identified. A chest x-ray was also obtained which returned negative for acute findings. Upon examination she is alert and oriented x 3 with the exception of the year. We are asked to admit the patient to our medical services however request for neurology consult and recommendations was first requested considering benign workup. Per ED, neurology recommended patient be evaluated    Orientation  Overall Orientation Status: Impaired  Orientation Level: Disoriented to situation;Disoriented to time;Disoriented to place;Oriented to person  Cognition  Overall Cognitive Status: Exceptions  Arousal/Alertness: Delayed responses to stimuli  Following Commands: Follows one step commands with increased time;Follows one step commands with repetition;Inconsistently follows commands  Attention Span: Difficulty attending to directions  Memory: Decreased long term memory;Decreased short term memory;Decreased recall of recent events;Decreased recall of precautions;Decreased recall of biographical Information  Safety Judgement: Decreased awareness of need for safety;Decreased awareness of need for assistance  Problem Solving: Decreased awareness of errors;Assistance required to correct errors made;Assistance required to identify errors made;Assistance required to implement solutions;Assistance required to generate solutions  Insights: Not aware of deficits  Initiation: Requires cues for all  Sequencing: Requires cues for all     Objective   Observation/Palpation  Posture: Fair (sitting EOB)  Observation: CAM boot on prior to arrival and remained on throughout session; fragile skin        AROM RLE (degrees)  RLE General AROM: AAROM WFL - Ankle not assessed d/t fracture (CAM boot in place)  AROM LLE (degrees)  LLE General AROM: Generally decreased but functional  AROM RUE (degrees)  RUE AROM : WFL  AROM LUE (degrees)  LUE AROM : WFL  Strength RLE  Strength RLE: Exception  Comment: Grossly 3-/5 at hip & knee; Ankle not assessed  Strength LLE  Strength LLE: Exception  Comment: Grossly 3+/5  Strength RUE  Comment: See OT assessment for detail  Strength LUE  Comment: See OT assessment for detail          Bed mobility  Supine to Sit: Maximum assistance;2 Person assistance  Sit to Supine: Maximum assistance;2 Person assistance  Scooting: Maximal assistance;2 Person assistance  Bed Mobility Comments: Pt w/ significant

## 2024-06-06 NOTE — PLAN OF CARE
Neurology Plan of Care    Called and spoke to patient's son, Eliel.  He had questions if she would be a candidate for hospice.  She is currently DNR CC.    At this time, based on progression of cognitive difficulties/dementia, her age, and recent hospitalizations including for ankle fracture, I do not think hospice is unreasonable.  There are current plans for DC to SNF with hospice follow-up there.  Family may also choose home with hospice if feasible.    All questions answered.    Marcelino Jhaveri, DO  Neurology/Epilepsy

## 2024-06-06 NOTE — PROGRESS NOTES
[facility] Neurology   IN-PATIENT SERVICE      NEUROLOGY PROGRESS  NOTE            Date:   2024  Patient name:  Sushila León  Date of admission:  2024  YOB: 1931      Interval History:     Overnight, continued to be restless, intermittently agitated.  Given Ativan.  CODE STATUS changed to DNR CC per primary team after discussion with family.  No new neurological changes otherwise.    History of Present Illness:     89-year-old female with past medical history of cognitive impairment, hypertension, hyperlipidemia, A-fib on Coumadin, history of TAVR, hypothyroidism, who presented with altered mental status.  Neurology consulted for further evaluation.  History limited given patient's current mentation.  Obtained primarily from son at bedside and chart review.     Son states that yesterday, patient started having worsening mentation.  She appeared to be having visual and auditory hallucinations, reaching for objects in space and not making sense.  Due to worsening symptoms, family brought her to the ED.  There was no focal weakness, fever, chills, seizures or seizure-like activity.  Family thought that she may have a UTI as urine seemed more malodorous.     She has no prior history of stroke or seizures.  Of note, son states that over the last few years, she has been having worsening memory, particularly since her   3 years ago.  Family thinks she has dementia although she has not had formal diagnosis.  Does not follow with neurology.  At baseline, she is oriented x 1-2.  Often does not know location or time.  She needs help with ADLs, especially more so recently after she had a right ankle fracture in April of this year.  She currently uses a walker, needs help using restroom, changing clothes.  She is a never smoker, no alcohol or drug use.  No recent changes in her medications.  She lives with her son and daughter-in-law.    Past Medical History:     Past Medical History:  (24hrs), Av.9 °F (36.6 °C), Min:97.2 °F (36.2 °C), Max:98.2 °F (36.8 °C)      Neurological examination (limited due to mentation):     Mental status    Asleep but awakens to verbal stimulation, oriented to person, does not know place, does not know time. Inattentive, requires frequent redirectioning. Follows most commands.      Cranial nerves    PERRL (3 mm OU), blinks to threat bilaterally, extraocular movements grossly intact, no clear facial droop, hearing decreased bilaterally, tongue midline      Motor function  Bilateral upper extremity 4-4+/5, bilateral lower extremity 4/5, limited on the right due to pain                   Sensory function Intact to touch throughout      Cerebellar Not assessed      Reflex function 1/4 symmetric throughout (deferred right achilles due to bandages/boot). Downgoing plantar response on left. (-)Avelar's sign bilaterally    Gait                  Deferred           Diagnostics:      Laboratory Testing:  CBC:   Recent Labs     24   WBC 5.4   HGB 11.2*        BMP:    Recent Labs     24      K 4.2      CO2 28   BUN 17   CREATININE 0.8   GLUCOSE 115*         Lab Results   Component Value Date    CHOL 143 2022    HDL 48 2022    TRIG 82 2022    ALT 13 2024    AST 22 2024    TSH 4.60 2024    INR 1.2 2024    [id number] 2024    MG 1.6 2024         Imaging/Diagnostics:      Results for orders placed during the hospital encounter of 24    CT HEAD WO CONTRAST    Narrative  EXAMINATION:  CT OF THE HEAD WITHOUT CONTRAST  2024 8:28 pm    TECHNIQUE:  CT of the head was performed without the administration of intravenous  contrast. Automated exposure control, iterative reconstruction, and/or weight  based adjustment of the mA/kV was utilized to reduce the radiation dose to as  low as reasonably achievable.    COMPARISON:  None.    HISTORY:  ORDERING SYSTEM PROVIDED HISTORY:

## 2024-06-06 NOTE — PROGRESS NOTES
Warfarin Dosing - Pharmacy Consult Note  Consulting Provider: Dottie Walker CNP  Indication:  Atrial Fibrillation  Warfarin Dose prior to admission: 4mg QD (per Vivian RN at [facility])    Concurrent anticoagulants/antiplatelets: none  Significant Drug Interactions:  levothyroxine & citalopram (both home medications)  Recent Labs     06/04/24  0024 06/04/24  2055 06/05/24  0502 06/06/24  0513   INR 1.3  --  1.2 1.2   HGB  --  11.2*  --   --    PLT  --  227  --   --      Recent warfarin administrations                     warfarin (COUMADIN) tablet 5 mg (mg) 5 mg Given 06/05/24 1732                   Date   INR    Dose  6/4         1.3         ?  6/5         1.2        5mg  6/6         1.2    Assessment/Plan  (Goal INR: 2 - 3)  INR sub-therapeutic. Giving another slightly boosted dose today. Proceeding cautiously due to age and UTI.  Warfarin 5mg today. INR in the morning.    Active problem list reviewed.  INR orders are placed.  Chart reviewed for pertinent labs, drug/diet interactions, and past doses.  Documentation of patient's clinical condition was reviewed.    Pharmacy Dosing:  Pharmacy will continue to follow.

## 2024-06-06 NOTE — PLAN OF CARE
Pt is demented at baseline. Has been declining over the last couple of months. She broke her ankle at the end of April, is NWB to affected extremity, and has not walked since. Per request and talking with physicians son would like hospice consulted. Hospice consult placed and will see patient tomorrow. She slept most of the day. Staff attempted to keep her awake to eat and take pills but she quickly fell back to sleep  Problem: Discharge Planning  Goal: Discharge to home or other facility with appropriate resources  Outcome: Progressing     Problem: Safety - Adult  Goal: Free from fall injury  Outcome: Progressing     Problem: ABCDS Injury Assessment  Goal: Absence of physical injury  Outcome: Progressing     Problem: Skin/Tissue Integrity  Goal: Absence of new skin breakdown  Description: 1.  Monitor for areas of redness and/or skin breakdown  2.  Assess vascular access sites hourly  3.  Every 4-6 hours minimum:  Change oxygen saturation probe site  4.  Every 4-6 hours:  If on nasal continuous positive airway pressure, respiratory therapy assess nares and determine need for appliance change or resting period.  Outcome: Progressing     Problem: Confusion  Goal: Confusion, delirium, dementia, or psychosis is improved or at baseline  Description: INTERVENTIONS:  1. Assess for possible contributors to thought disturbance, including medications, impaired vision or hearing, underlying metabolic abnormalities, dehydration, psychiatric diagnoses, and notify attending LIP  2. [place] high risk fall precautions, as indicated  3. Provide frequent short contacts to provide reality reorientation, refocusing and direction  4. Decrease environmental stimuli, including noise as appropriate  5. Monitor and intervene to maintain adequate nutrition, hydration, elimination, sleep and activity  6. If unable to ensure safety without constant attention obtain sitter and review sitter guidelines with assigned personnel  7. Initiate  Psychosocial CNS and Spiritual Care consult, as indicated  Outcome: Progressing     Problem: Chronic Conditions and Co-morbidities  Goal: Patient's chronic conditions and co-morbidity symptoms are monitored and maintained or improved  Outcome: Progressing

## 2024-06-07 VITALS
DIASTOLIC BLOOD PRESSURE: 74 MMHG | OXYGEN SATURATION: 95 % | HEART RATE: 59 BPM | RESPIRATION RATE: 16 BRPM | TEMPERATURE: 98.4 F | HEIGHT: 64 IN | SYSTOLIC BLOOD PRESSURE: 183 MMHG | WEIGHT: 183.2 LBS | BODY MASS INDEX: 31.28 KG/M2

## 2024-06-07 PROBLEM — F03.90 DELIRIUM WITH DEMENTIA (HCC): Status: ACTIVE | Noted: 2024-06-07

## 2024-06-07 PROBLEM — R41.0 DELIRIUM WITH DEMENTIA: Status: ACTIVE | Noted: 2024-06-07

## 2024-06-07 PROBLEM — F05 DELIRIUM WITH DEMENTIA (HCC): Status: ACTIVE | Noted: 2024-06-07

## 2024-06-07 LAB
INR PPP: 1.4
PROTHROMBIN TIME: 17.3 SEC (ref 11.5–14.2)

## 2024-06-07 PROCEDURE — 1200000000 HC SEMI PRIVATE

## 2024-06-07 PROCEDURE — 85610 PROTHROMBIN TIME: CPT

## 2024-06-07 PROCEDURE — 2580000003 HC RX 258: Performed by: FAMILY MEDICINE

## 2024-06-07 PROCEDURE — 99231 SBSQ HOSP IP/OBS SF/LOW 25: CPT | Performed by: FAMILY MEDICINE

## 2024-06-07 PROCEDURE — 36415 COLL VENOUS BLD VENIPUNCTURE: CPT

## 2024-06-07 RX ORDER — WARFARIN SODIUM 2 MG/1
4 TABLET ORAL
Status: DISCONTINUED | OUTPATIENT
Start: 2024-06-07 | End: 2024-06-07 | Stop reason: HOSPADM

## 2024-06-07 RX ADMIN — CITALOPRAM HYDROBROMIDE 20 MG: 20 TABLET ORAL at 10:56

## 2024-06-07 RX ADMIN — LEVOTHYROXINE SODIUM 25 MCG: 25 TABLET ORAL at 10:55

## 2024-06-07 RX ADMIN — SODIUM CHLORIDE: 9 INJECTION, SOLUTION INTRAVENOUS at 04:17

## 2024-06-07 RX ADMIN — ATORVASTATIN CALCIUM 40 MG: 40 TABLET, FILM COATED ORAL at 10:56

## 2024-06-07 RX ADMIN — METOPROLOL SUCCINATE 25 MG: 25 TABLET, EXTENDED RELEASE ORAL at 10:56

## 2024-06-07 NOTE — PLAN OF CARE
Problem: ABCDS Injury Assessment  Goal: Absence of physical injury  6/6/2024 2040 by Marita Tapia, RN  Outcome: Progressing  Flowsheets (Taken 6/6/2024 2040)  Absence of Physical Injury: Implement safety measures based on patient assessment  6/6/2024 1743 by Duarte Barbour, RN  Outcome: Progressing

## 2024-06-07 NOTE — CARE COORDINATION
Family had meeting with [facility] and plan is for patient to discharge home this evening with family and [facility] will have RN out to patient's home tomorrow morning to start hospice care. LFN to transport patient home at 6pm today.     1300- PS message sent to Dr. Barbosa notifying that UR is requesting patient be changed to inpatient.     1345- CM spoke with patient's son Eliel and confirmed discharge plans. Eliel verbalizes being agreeable with plan and states that they have no additional transitional needs at this time.    Discharge Report    [facility]  Clinical Case Management Department  Written by: Leyla Colorado RN    Patient Name: Sushial León  Attending Provider: Lamberto Barbosa MD  Admit Date: 2024  8:22 PM  MRN: [id number]  Account: [id number]                     : 1931  Discharge Date: 24      Disposition: home w/ [facility]    Leyla Colorado RN

## 2024-06-07 NOTE — PLAN OF CARE
Neurology Plan of Care    Family elected to proceed with hospice care. Will dc MRI order in this case. Please call if any further questions.    Marcelino Jhaveri, DO  Neurology/Epilepsy

## 2024-06-07 NOTE — PLAN OF CARE
Urine cultures negative. Patient's son met with hospice and has elected to go home with hospice following.   Problem: Discharge Planning  Goal: Discharge to home or other facility with appropriate resources  Outcome: Adequate for Discharge     Problem: Safety - Adult  Goal: Free from fall injury  Outcome: Adequate for Discharge     Problem: ABCDS Injury Assessment  Goal: Absence of physical injury  Outcome: Adequate for Discharge     Problem: Skin/Tissue Integrity  Goal: Absence of new skin breakdown  Description: 1.  Monitor for areas of redness and/or skin breakdown  2.  Assess vascular access sites hourly  3.  Every 4-6 hours minimum:  Change oxygen saturation probe site  4.  Every 4-6 hours:  If on nasal continuous positive airway pressure, respiratory therapy assess nares and determine need for appliance change or resting period.  Outcome: Adequate for Discharge     Problem: Confusion  Goal: Confusion, delirium, dementia, or psychosis is improved or at baseline  Description: INTERVENTIONS:  1. Assess for possible contributors to thought disturbance, including medications, impaired vision or hearing, underlying metabolic abnormalities, dehydration, psychiatric diagnoses, and notify attending LIP  2. [place] high risk fall precautions, as indicated  3. Provide frequent short contacts to provide reality reorientation, refocusing and direction  4. Decrease environmental stimuli, including noise as appropriate  5. Monitor and intervene to maintain adequate nutrition, hydration, elimination, sleep and activity  6. If unable to ensure safety without constant attention obtain sitter and review sitter guidelines with assigned personnel  7. Initiate Psychosocial CNS and Spiritual Care consult, as indicated  Outcome: Adequate for Discharge     Problem: Chronic Conditions and Co-morbidities  Goal: Patient's chronic conditions and co-morbidity symptoms are monitored and maintained or improved  Outcome: Adequate for

## 2024-06-07 NOTE — PROGRESS NOTES
Warfarin Dosing - Pharmacy Consult Note  Consulting Provider: Dottie Walker CNP   Indication:  Atrial Fibrillation  Warfarin Dose prior to admission: 4mg daily (per [facility])   Concurrent anticoagulants/antiplatelets: none  Significant Drug Interactions: No obvious interactions  Recent Labs     06/04/24 2055 06/05/24  0502 06/06/24  0513 06/07/24  0528   INR  --  1.2 1.2 1.4   HGB 11.2*  --   --   --      --   --   --      Recent warfarin administrations                     warfarin (COUMADIN) tablet 5 mg (mg) 5 mg Given 06/06/24 1757    warfarin (COUMADIN) tablet 5 mg (mg) 5 mg Given 06/05/24 1732                   Date   INR    Dose  6/4         1.3         ?  6/5         1.2         5 mg  6/6         1.2         5 mg  6/7         1.4     Assessment/Plan  (Goal INR: 2 - 3)  Will return to normal home dosing. Coumadin 4mg today. INR in AM.     Active problem list reviewed.  INR orders are placed.  Chart reviewed for pertinent labs, drug/diet interactions, and past doses.  Documentation of patient's clinical condition was reviewed.    Pharmacy Dosing:  Pharmacy will continue to follow.

## 2024-06-07 NOTE — PROGRESS NOTES
Pt discharged to home with transport  Discharge instructions given  Locked up home medication(s)/personal items given to patient at discharge

## 2024-06-07 NOTE — PROGRESS NOTES
mg, Oral, Daily  levothyroxine, 25 mcg, Oral, Daily  metoprolol succinate, 25 mg, Oral, Daily  pantoprazole, 40 mg, Oral, QAM AC  psyllium, 400 mg, Oral, Daily  sodium chloride flush, 5-40 mL, IntraVENous, 2 times per day  warfarin placeholder: dosing by pharmacy, , Other, RX Placeholder  QUEtiapine, 12.5 mg, Oral, Nightly  cefTRIAXone (ROCEPHIN) IV, 1,000 mg, IntraVENous, Q24H        Review of Systems   Review of Systems   Unable to perform ROS: Dementia     Objective :      Current Vitals : Temp: 98.4 °F (36.9 °C),  Pulse: 59, Respirations: 16, BP: (!) 183/74, SpO2: 95 %  Last 24 Hrs Vitals   Patient Vitals for the past 24 hrs:   BP Temp Temp src Pulse Resp SpO2 Weight   06/07/24 0719 (!) 183/74 98.4 °F (36.9 °C) Axillary 59 16 95 % --   06/07/24 0355 -- -- -- -- -- -- 83.1 kg (183 lb 3.2 oz)   06/06/24 1919 (!) 152/44 97.7 °F (36.5 °C) Oral 59 18 95 % --       Intake / output   06/05 1901 - 06/07 0700  In: 3235.4 [P.O.:100; I.V.:2187.7]  Out: 450 [Urine:450]  Physical Exam:  Physical Exam  Vitals and nursing note reviewed.   Constitutional:       General: She is not in acute distress.     Appearance: She is not diaphoretic.   HENT:      Head: Normocephalic and atraumatic.      Nose:      Right Sinus: No maxillary sinus tenderness or frontal sinus tenderness.      Left Sinus: No maxillary sinus tenderness or frontal sinus tenderness.      Mouth/Throat:      Pharynx: No oropharyngeal exudate.   Eyes:      General: No scleral icterus.     Conjunctiva/sclera: Conjunctivae normal.      Pupils: Pupils are equal, round, and reactive to light.   Neck:      Thyroid: No thyromegaly.      Vascular: No JVD.   Cardiovascular:      Rate and Rhythm: Normal rate and regular rhythm.      Pulses:           Dorsalis pedis pulses are 2+ on the right side and 2+ on the left side.      Heart sounds: Normal heart sounds. No murmur heard.  Pulmonary:      Effort: Pulmonary effort is normal.      Breath sounds: Normal breath sounds. No  unchanged  Assessment and Plan  :        Altered mental status with confusion and hallucination with underlying dementia.  Possibly secondary dehydration.  Patient has bacteria.  Will treat empirically with antibiotics.  She had ESBL infection before.  Urine culture negative. Family changed code status yesterday and no further work up .   Seroquel 12.5 mg nightly as needed.  PAF -continue Toprol-XL 25 mg daily, warfarin.  Primary Hypertension -well-controlled.  Dyslipidemia   S/p TAVR   Recent bimalleolar fracture -April 2024.  . Family chose conservative treatment at that time.    Patient stable for discharge to skilled nursing facility.  Hospice to see at skilled nursing facility.    PT OT evaluation   Fall precautions       Plan and updates discussed with patient's son  ,  answers  explained to satisfaction.   Plan discussed with Staff  RN     (This note is created with the assistance of a speech recognition program. While intending to generate a document that actually reflects the content of the visit, the document can still have some errors including those of syntax and sound a like substitutions which may escape proof reading. In such instances, actual meaning can be extrapolated by contextual diversion.)      Lamberto Barbosa MD  6/7/2024

## 2024-06-07 NOTE — PROGRESS NOTES
Physical Therapy  DATE: 2024    NAME: Sushila León  MRN: [id number]   : 1931    Patient not seen this date for Physical Therapy due to:      [] Cancel by RN or physician due to:    [] Hemodialysis    [] Critical Lab Value Level     [] Blood transfusion in progress    [] Acute or unstable cardiovascular status   _MAP < 55 or more than >115  _HR < 40 or > 130    [] Acute or unstable pulmonary status   -FiO2 > 60%   _RR < 5 or >40    _O2 sats < 85%    [] Strict Bedrest    [] Off Unit for surgery or procedure    [] Off Unit for testing       [] Pending imaging to R/O fracture    [] Refusal by Patient      [] Other      [] PT being discontinued at this time. Patient independent. No further needs.     [x] PT being discontinued at this time as the patient has been transferred to hospice care. No further needs.      JOE LEVI, PTA

## 2024-06-08 NOTE — DISCHARGE SUMMARY
fair     Discharged Condition: stable    Hospital Stay:     Hospital Course:  Sushila León is a 89 y.o. female who was admitted for the management of   Confusion , presented to ER with Altered Mental Status (Family concerned for UTI, changing brief more often, odor to urine, has some baseline confusion that has worsened, having hallucinations)  Patient was brought to emergency room by family after she was having change in mental status and confusion with hallucination.  She had gradual decline in health for last 2 months after she had a fall with bimalleolar fracture.  Family chose conservative treatment at that time and patient was discharged to [facility].  Patient had returned back from [place] [facility] for weeks before and was living with her son.  She started having confusion and was having active hallucination.  Patient did not any fever, focal weakness, speech difficulty.  Evaluation on presentation showed elevated BP. Lab work up showed negative CT head, normal WBC, negative  CXR.  Urine showed bacteria.  Patient has underlying history of valvular heart disease with TAVR in 2018, atrial flutter/fibrillation, presence of permanent pacemaker.  Patient is on long-term anticoagulation with Coumadin.  She is allergic to aspirin.    Patient was treated empirically with Rocephin for UTI.  Culture was negative.  Neurology was consulted, recommended Seroquel at nighttime.  Family changed CODE STATUS of DNR CC with conservative treatment due to advanced age and dementia.  Patient was discharged to SNF.    Significant therapeutic interventions:   Altered mental status with confusion and hallucination with underlying dementia.  Possibly secondary dehydration.  UA positive for  bacteria. Treated empirically with rocephin for UTI, culture negative. Seroquel 12.5 mg nightly.   PAF -continue Toprol-XL 25 mg daily, warfarin.  Primary Hypertension -well-controlled.  Dyslipidemia   S/p TAVR   Recent bimalleolar  CNP for the opportunity to be involved in this patient's care.     (This note is created with the assistance of a speech recognition program. While intending to generate a document that actually reflects the content of the visit, the document can still have some errors including those of syntax and sound a like substitutions which may escape proof reading. In such instances, actual meaning can be extrapolated by contextual diversion.)

## 2024-06-08 NOTE — PROGRESS NOTES
Physician Progress Note      PATIENT:               DANAE COBB  CSN #:                  [id number]  :                       1931  ADMIT DATE:       2024 8:22 PM  DISCH DATE:        2024 6:34 PM  RESPONDING  PROVIDER #:        Marcelino Hale DO          QUERY TEXT:    Pt admitted with AMS and has acute encephalopathy documented.  Neurology   note reports, \"Acute encephalopathy, in a patient with worsening dementia,   possible superimposed metabolic etiology.\" If possible, please document in   progress notes and discharge summary further specificity regarding the type of   encephalopathy:    The medical record reflects the following:  Risk Factors: Dementia, dehydration, age    Clinical Indicators:  Neurology consult notes, \"Acute encephalopathy, in a   patient with worsening dementia, possible superimposed metabolic etiology\" and   \"Continue GOC discussions given progressing dementia.\"   EEG reports, \"This EEG was abnormal. Disorganized and slow background   suggested mild to moderate encephalopathy of non specific etiology.\"   IM note reports, \"Altered mental status with confusion and hallucination   with underlying dementia.  Possibly secondary dehydration.\"    Treatment: Ativan, Ceftriaxone, EEG, CT head, neurology consult    Thank you,  Zach Sharp RN CDI  [email or web address]  Options provided:  -- Metabolic encephalopathy  -- Encephalopathy ruled out. Altered mental status from progressing dementia  -- Other - I will add my own diagnosis  -- Disagree - Not applicable / Not valid  -- Disagree - Clinically unable to determine / Unknown  -- Refer to Clinical Documentation Reviewer    PROVIDER RESPONSE TEXT:    This patient has metabolic encephalopathy.    Query created by: Zach Sharp on 2024 3:26 PM      Electronically signed by:  Marcelino Hale DO 2024 12:05 PM

## 2024-06-12 NOTE — PROGRESS NOTES
Physician Progress Note      PATIENT:               DANAE COBB  [facility] #:                  [id number]  :                       1931  ADMIT DATE:       2024 8:22 PM  DISCH DATE:        2024 6:34 PM  RESPONDING  PROVIDER #:        Lamberto Burt MD          QUERY TEXT:    Patient admitted with dementia, noted to have paroxysmal atrial fibrillation   and is maintained on Warfarin. If possible, please document in progress notes   and discharge summary if you are evaluating and/or treating any of the   following:    The medical record reflects the following:  Risk Factors: Age 89, female, HTN  Clinical Indicators: Patient with paroxysmal atrial fibrillation and   maintained on Warfarin. Noted with PMH of Secondary hypercoagulable state, but   not mentioned as current diagnosis.  Treatment: Warfarin    Thank you,  Zach Sharp RN CDI  [email or web address]  Options provided:  -- Secondary hypercoagulable state in a patient with atrial fibrillation  -- Other - I will add my own diagnosis  -- Disagree - Not applicable / Not valid  -- Disagree - Clinically unable to determine / Unknown  -- Refer to Clinical Documentation Reviewer    PROVIDER RESPONSE TEXT:    This patient has secondary hypercoagulable state in a patient with atrial   fibrillation.    Query created by: Zach Sharp on 2024 3:19 PM      Electronically signed by:  Lamberto Burt MD 2024 10:06 AM

## 2024-06-19 ENCOUNTER — APPOINTMENT (OUTPATIENT)
Dept: CT IMAGING | Age: 89
End: 2024-06-19
Payer: MEDICARE

## 2024-06-19 ENCOUNTER — APPOINTMENT (OUTPATIENT)
Dept: GENERAL RADIOLOGY | Age: 89
End: 2024-06-19
Payer: MEDICARE

## 2024-06-19 ENCOUNTER — HOSPITAL ENCOUNTER (EMERGENCY)
Age: 89
Discharge: HOME | End: 2024-06-19
Attending: EMERGENCY MEDICINE
Payer: MEDICARE

## 2024-06-19 VITALS
DIASTOLIC BLOOD PRESSURE: 51 MMHG | OXYGEN SATURATION: 98 % | HEART RATE: 60 BPM | TEMPERATURE: 97.7 F | RESPIRATION RATE: 18 BRPM | SYSTOLIC BLOOD PRESSURE: 145 MMHG
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
## 2024-06-19 DIAGNOSIS — S89.91XA INJURY OF RIGHT KNEE, INITIAL ENCOUNTER: ICD-10-CM

## 2024-06-19 PROCEDURE — 6370000000 HC RX 637 (ALT 250 FOR IP): Performed by: EMERGENCY MEDICINE

## 2024-06-19 PROCEDURE — 99284 EMERGENCY DEPT VISIT MOD MDM: CPT

## 2024-06-19 PROCEDURE — 73562 X-RAY EXAM OF KNEE 3: CPT

## 2024-06-19 PROCEDURE — 70450 CT HEAD/BRAIN W/O DYE: CPT

## 2024-06-19 RX ORDER — ACETAMINOPHEN 325 MG/1
650 TABLET ORAL ONCE
Status: COMPLETED | OUTPATIENT
Start: 2024-06-19 | End: 2024-06-19

## 2024-06-19 RX ADMIN — ACETAMINOPHEN 650 MG: 325 TABLET ORAL at 10:45

## 2024-06-19 ASSESSMENT — PAIN - FUNCTIONAL ASSESSMENT
PAIN_FUNCTIONAL_ASSESSMENT: 0-10
PAIN_FUNCTIONAL_ASSESSMENT: 0-10

## 2024-06-19 ASSESSMENT — PAIN SCALES - GENERAL
PAINLEVEL_OUTOF10: 3
PAINLEVEL_OUTOF10: 8
PAINLEVEL_OUTOF10: 8
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
## 2024-06-19 NOTE — DISCHARGE INSTR - COC
SpO2 98%     Last documented pain score (0-10 scale): Pain Level: 8  Last Weight:   Wt Readings from Last 1 Encounters:   06/07/24 83.1 kg (183 lb 3.2 oz)     Mental Status:  alert, thought processes intact, and alert to self, dementia,     IV Access:  - None    Nursing Mobility/ADLs:  Walking   Dependent  Transfer  Dependent  Bathing  Dependent  Dressing  Assisted  Toileting  Assisted  Feeding  Independent  Med Admin  Dependent  Med Delivery   whole    Wound Care Documentation and Therapy:        Elimination:  Continence:   Bowel: yes  Bladder: Yes  Urinary Catheter: None   Colostomy/Ileostomy/Ileal Conduit: No       Date of Last BM: unknown    No intake or output data in the 24 hours ending 06/19/24 1354  No intake/output data recorded.    Safety Concerns:     Sund[employer] [place] and At Risk for Falls    Impairments/Disabilities:      Healing right ankle fx    Nutrition Therapy:  Current Nutrition Therapy:   - Oral Diet:  General    Routes of Feeding: Oral  Liquids: Thin Liquids  Daily Fluid Restriction: no  Last Modified Barium Swallow with Video (Video Swallowing Test): not done    Treatments at the Time of Hospital Discharge:   Respiratory Treatments:   Oxygen Therapy:   NA  Ventilator:    - No ventilator support    Rehab Therapies: Physical Therapy and Occupational Therapy  Weight Bearing Status/Restrictions: No weight bearing restrictions  Other Medical Equipment (for information only, NOT a DME order):  wheelchair, bath bench, bedside commode, and hospital bed  Other Treatments:     Patient's personal belongings (please select all that are sent with patient):  None    RN SIGNATURE:  Electronically signed by Joleen X Schwab, RN on 6/19/24 at 1:57 PM EDT    CASE MANAGEMENT/SOCIAL WORK SECTION    Inpatient Status Date: 6/19/24    Readmission Risk Assessment Score:  Readmission Risk              Risk of Unplanned Readmission:  0           Discharging to Facility/ Agency   Name: Ovidio Bernadette  Address:    Phone:

## 2024-06-19 NOTE — ED NOTES
Patient lives at home with family. She has dementia and is cared for by son and grandchildren. Patient has had falls trying to get up out of bed and has prior fracture. Family has been trying to get home health for some time but has been unable to due to not having a PCP. LSW spoke with Zena at [facility]. She asked for a home health referral to be sent to [facility] at [phone]. ERAN completed.

## (undated) DEVICE — BLOCK BITE 60FR RUBBER ADLT DENTAL

## (undated) DEVICE — JELLY,LUBE,STERILE,FLIP TOP,TUBE,2-OZ: Brand: MEDLINE

## (undated) DEVICE — FORCEPS BX L240CM WRK CHN 2.8MM STD CAP W/ NDL MIC MESH

## (undated) DEVICE — ADAPTER TBNG LUER STUB 15 GA INTMED

## (undated) DEVICE — CUP MED 1OZ CLR POLYPR FEED GRAD W/O LID

## (undated) DEVICE — BASIN EMSIS 700ML GRAPHITE PLAS KID SHP GRAD

## (undated) DEVICE — GAUZE,SPONGE,4"X4",16PLY,STRL,LF,10/TRAY: Brand: MEDLINE

## (undated) DEVICE — 60 ML SYRINGE LUER-LOCK TIP: Brand: MONOJECT